# Patient Record
Sex: MALE | Race: WHITE | Employment: OTHER | URBAN - METROPOLITAN AREA
[De-identification: names, ages, dates, MRNs, and addresses within clinical notes are randomized per-mention and may not be internally consistent; named-entity substitution may affect disease eponyms.]

---

## 2018-03-13 ENCOUNTER — HOSPITAL ENCOUNTER (OUTPATIENT)
Dept: CARDIAC REHAB | Age: 83
Setting detail: THERAPIES SERIES
Discharge: HOME | End: 2018-03-13

## 2018-03-24 PROCEDURE — 9900000038 HC CARDIAC REHAB PHASE 3 - MONTHLY

## 2018-04-03 ENCOUNTER — HOSPITAL ENCOUNTER (OUTPATIENT)
Age: 83
Setting detail: THERAPIES SERIES
Discharge: HOME | End: 2018-04-03

## 2018-04-03 PROCEDURE — 9900000038 HC CARDIAC REHAB PHASE 3 - MONTHLY

## 2018-04-03 RX ORDER — METOPROLOL SUCCINATE 25 MG/1
25 TABLET, EXTENDED RELEASE ORAL DAILY
Qty: 30 TABLET | Refills: 11 | Status: SHIPPED | OUTPATIENT
Start: 2018-04-03 | End: 2019-04-04 | Stop reason: SDUPTHER

## 2018-05-07 ENCOUNTER — HOSPITAL ENCOUNTER (OUTPATIENT)
Age: 83
Setting detail: THERAPIES SERIES
Discharge: HOME | End: 2018-05-07

## 2018-05-07 PROCEDURE — 9900000038 HC CARDIAC REHAB PHASE 3 - MONTHLY

## 2018-05-16 ENCOUNTER — NURSE ONLY (OUTPATIENT)
Dept: NON INVASIVE DIAGNOSTICS | Age: 83
End: 2018-05-16
Payer: MEDICARE

## 2018-05-16 ENCOUNTER — TELEPHONE (OUTPATIENT)
Dept: NON INVASIVE DIAGNOSTICS | Age: 83
End: 2018-05-16

## 2018-05-16 DIAGNOSIS — Z95.810 ICD (IMPLANTABLE CARDIOVERTER-DEFIBRILLATOR), BIVENTRICULAR, IN SITU: Primary | ICD-10-CM

## 2018-05-16 DIAGNOSIS — I50.22 CHRONIC SYSTOLIC HEART FAILURE (HCC): ICD-10-CM

## 2018-05-16 PROCEDURE — 93295 DEV INTERROG REMOTE 1/2/MLT: CPT | Performed by: INTERNAL MEDICINE

## 2018-05-16 PROCEDURE — 93296 REM INTERROG EVL PM/IDS: CPT | Performed by: INTERNAL MEDICINE

## 2018-06-01 ENCOUNTER — HOSPITAL ENCOUNTER (OUTPATIENT)
Age: 83
Discharge: HOME | End: 2018-06-01

## 2018-06-01 PROCEDURE — 9900000038 HC CARDIAC REHAB PHASE 3 - MONTHLY

## 2018-06-05 ENCOUNTER — OFFICE VISIT (OUTPATIENT)
Dept: CARDIOLOGY CLINIC | Age: 83
End: 2018-06-05
Payer: MEDICARE

## 2018-06-05 VITALS
BODY MASS INDEX: 20.5 KG/M2 | HEIGHT: 71 IN | SYSTOLIC BLOOD PRESSURE: 112 MMHG | WEIGHT: 146.4 LBS | DIASTOLIC BLOOD PRESSURE: 80 MMHG | RESPIRATION RATE: 16 BRPM | HEART RATE: 71 BPM

## 2018-06-05 DIAGNOSIS — I10 ESSENTIAL HYPERTENSION, BENIGN: Primary | Chronic | ICD-10-CM

## 2018-06-05 PROCEDURE — 1123F ACP DISCUSS/DSCN MKR DOCD: CPT | Performed by: INTERNAL MEDICINE

## 2018-06-05 PROCEDURE — 99214 OFFICE O/P EST MOD 30 MIN: CPT | Performed by: INTERNAL MEDICINE

## 2018-06-05 PROCEDURE — G8598 ASA/ANTIPLAT THER USED: HCPCS | Performed by: INTERNAL MEDICINE

## 2018-06-05 PROCEDURE — 4040F PNEUMOC VAC/ADMIN/RCVD: CPT | Performed by: INTERNAL MEDICINE

## 2018-06-05 PROCEDURE — 93000 ELECTROCARDIOGRAM COMPLETE: CPT | Performed by: INTERNAL MEDICINE

## 2018-06-05 PROCEDURE — 1036F TOBACCO NON-USER: CPT | Performed by: INTERNAL MEDICINE

## 2018-06-05 PROCEDURE — G8427 DOCREV CUR MEDS BY ELIG CLIN: HCPCS | Performed by: INTERNAL MEDICINE

## 2018-06-05 PROCEDURE — G8420 CALC BMI NORM PARAMETERS: HCPCS | Performed by: INTERNAL MEDICINE

## 2018-07-02 ENCOUNTER — HOSPITAL ENCOUNTER (OUTPATIENT)
Age: 83
Discharge: HOME | End: 2018-07-02

## 2018-07-02 PROCEDURE — 9900000038 HC CARDIAC REHAB PHASE 3 - MONTHLY

## 2018-08-01 ENCOUNTER — HOSPITAL ENCOUNTER (OUTPATIENT)
Age: 83
Discharge: HOME | End: 2018-08-01

## 2018-08-01 PROCEDURE — 9900000038 HC CARDIAC REHAB PHASE 3 - MONTHLY

## 2018-08-15 ENCOUNTER — TELEPHONE (OUTPATIENT)
Dept: NON INVASIVE DIAGNOSTICS | Age: 83
End: 2018-08-15

## 2018-08-15 ENCOUNTER — NURSE ONLY (OUTPATIENT)
Dept: NON INVASIVE DIAGNOSTICS | Age: 83
End: 2018-08-15
Payer: MEDICARE

## 2018-08-15 DIAGNOSIS — I50.22 CHRONIC SYSTOLIC HEART FAILURE (HCC): ICD-10-CM

## 2018-08-15 DIAGNOSIS — Z95.810 ICD (IMPLANTABLE CARDIOVERTER-DEFIBRILLATOR), BIVENTRICULAR, IN SITU: Primary | ICD-10-CM

## 2018-08-15 PROCEDURE — 93296 REM INTERROG EVL PM/IDS: CPT | Performed by: INTERNAL MEDICINE

## 2018-08-15 PROCEDURE — 93295 DEV INTERROG REMOTE 1/2/MLT: CPT | Performed by: INTERNAL MEDICINE

## 2018-08-15 NOTE — TELEPHONE ENCOUNTER
We have received your remote transmission. Our staff will contact you if there is anything that needs to be discussed. Your next appointment is 11/14/2018 remote transmission from home.

## 2018-09-01 ENCOUNTER — HOSPITAL ENCOUNTER (OUTPATIENT)
Age: 83
Discharge: HOME | End: 2018-09-01

## 2018-09-01 PROCEDURE — 9900000038 HC CARDIAC REHAB PHASE 3 - MONTHLY

## 2018-10-04 ENCOUNTER — HOSPITAL ENCOUNTER (OUTPATIENT)
Age: 83
Discharge: HOME | End: 2018-10-04

## 2018-10-04 PROCEDURE — 9900000038 HC CARDIAC REHAB PHASE 3 - MONTHLY

## 2018-11-01 ENCOUNTER — HOSPITAL ENCOUNTER (OUTPATIENT)
Age: 83
Discharge: HOME | End: 2018-11-01

## 2018-11-01 PROCEDURE — 9900000038 HC CARDIAC REHAB PHASE 3 - MONTHLY

## 2018-11-14 ENCOUNTER — NURSE ONLY (OUTPATIENT)
Dept: NON INVASIVE DIAGNOSTICS | Age: 83
End: 2018-11-14
Payer: MEDICARE

## 2018-11-14 ENCOUNTER — TELEPHONE (OUTPATIENT)
Dept: NON INVASIVE DIAGNOSTICS | Age: 83
End: 2018-11-14

## 2018-11-14 DIAGNOSIS — Z95.810 ICD (IMPLANTABLE CARDIOVERTER-DEFIBRILLATOR), BIVENTRICULAR, IN SITU: Primary | ICD-10-CM

## 2018-11-14 DIAGNOSIS — I48.21 PERMANENT ATRIAL FIBRILLATION (HCC): ICD-10-CM

## 2018-11-14 PROCEDURE — 93295 DEV INTERROG REMOTE 1/2/MLT: CPT | Performed by: INTERNAL MEDICINE

## 2018-11-14 PROCEDURE — 93296 REM INTERROG EVL PM/IDS: CPT | Performed by: INTERNAL MEDICINE

## 2018-12-03 ENCOUNTER — HOSPITAL ENCOUNTER (OUTPATIENT)
Age: 83
Discharge: HOME | End: 2018-12-03

## 2018-12-03 PROCEDURE — 9900000038 HC CARDIAC REHAB PHASE 3 - MONTHLY

## 2018-12-14 ENCOUNTER — OFFICE VISIT (OUTPATIENT)
Dept: NON INVASIVE DIAGNOSTICS | Age: 83
End: 2018-12-14
Payer: MEDICARE

## 2018-12-14 VITALS
HEIGHT: 70 IN | BODY MASS INDEX: 21.22 KG/M2 | WEIGHT: 148.2 LBS | HEART RATE: 70 BPM | RESPIRATION RATE: 16 BRPM | SYSTOLIC BLOOD PRESSURE: 136 MMHG | DIASTOLIC BLOOD PRESSURE: 82 MMHG

## 2018-12-14 DIAGNOSIS — Z95.810 ICD (IMPLANTABLE CARDIOVERTER-DEFIBRILLATOR), BIVENTRICULAR, IN SITU: Primary | ICD-10-CM

## 2018-12-14 PROCEDURE — 99213 OFFICE O/P EST LOW 20 MIN: CPT | Performed by: INTERNAL MEDICINE

## 2018-12-14 PROCEDURE — 1036F TOBACCO NON-USER: CPT | Performed by: INTERNAL MEDICINE

## 2018-12-14 PROCEDURE — G8420 CALC BMI NORM PARAMETERS: HCPCS | Performed by: INTERNAL MEDICINE

## 2018-12-14 PROCEDURE — G8484 FLU IMMUNIZE NO ADMIN: HCPCS | Performed by: INTERNAL MEDICINE

## 2018-12-14 PROCEDURE — G8598 ASA/ANTIPLAT THER USED: HCPCS | Performed by: INTERNAL MEDICINE

## 2018-12-14 PROCEDURE — G8427 DOCREV CUR MEDS BY ELIG CLIN: HCPCS | Performed by: INTERNAL MEDICINE

## 2018-12-14 PROCEDURE — 1123F ACP DISCUSS/DSCN MKR DOCD: CPT | Performed by: INTERNAL MEDICINE

## 2018-12-14 PROCEDURE — 1101F PT FALLS ASSESS-DOCD LE1/YR: CPT | Performed by: INTERNAL MEDICINE

## 2018-12-14 PROCEDURE — 4040F PNEUMOC VAC/ADMIN/RCVD: CPT | Performed by: INTERNAL MEDICINE

## 2018-12-14 PROCEDURE — 93284 PRGRMG EVAL IMPLANTABLE DFB: CPT | Performed by: INTERNAL MEDICINE

## 2019-01-02 ENCOUNTER — OFFICE VISIT (OUTPATIENT)
Dept: CARDIOLOGY CLINIC | Age: 84
End: 2019-01-02
Payer: MEDICARE

## 2019-01-02 VITALS
RESPIRATION RATE: 16 BRPM | DIASTOLIC BLOOD PRESSURE: 70 MMHG | HEART RATE: 92 BPM | HEIGHT: 69 IN | SYSTOLIC BLOOD PRESSURE: 128 MMHG | BODY MASS INDEX: 21.71 KG/M2 | WEIGHT: 146.6 LBS

## 2019-01-02 DIAGNOSIS — I10 ESSENTIAL HYPERTENSION, BENIGN: Primary | Chronic | ICD-10-CM

## 2019-01-02 PROCEDURE — 4040F PNEUMOC VAC/ADMIN/RCVD: CPT | Performed by: INTERNAL MEDICINE

## 2019-01-02 PROCEDURE — G8427 DOCREV CUR MEDS BY ELIG CLIN: HCPCS | Performed by: INTERNAL MEDICINE

## 2019-01-02 PROCEDURE — G8598 ASA/ANTIPLAT THER USED: HCPCS | Performed by: INTERNAL MEDICINE

## 2019-01-02 PROCEDURE — G8484 FLU IMMUNIZE NO ADMIN: HCPCS | Performed by: INTERNAL MEDICINE

## 2019-01-02 PROCEDURE — G8420 CALC BMI NORM PARAMETERS: HCPCS | Performed by: INTERNAL MEDICINE

## 2019-01-02 PROCEDURE — 1123F ACP DISCUSS/DSCN MKR DOCD: CPT | Performed by: INTERNAL MEDICINE

## 2019-01-02 PROCEDURE — 1101F PT FALLS ASSESS-DOCD LE1/YR: CPT | Performed by: INTERNAL MEDICINE

## 2019-01-02 PROCEDURE — 99214 OFFICE O/P EST MOD 30 MIN: CPT | Performed by: INTERNAL MEDICINE

## 2019-01-02 PROCEDURE — 93000 ELECTROCARDIOGRAM COMPLETE: CPT | Performed by: INTERNAL MEDICINE

## 2019-01-02 PROCEDURE — 1036F TOBACCO NON-USER: CPT | Performed by: INTERNAL MEDICINE

## 2019-01-05 ENCOUNTER — HOSPITAL ENCOUNTER (OUTPATIENT)
Age: 84
Discharge: HOME | End: 2019-01-05

## 2019-01-05 PROCEDURE — 9900000038 HC CARDIAC REHAB PHASE 3 - MONTHLY

## 2019-02-05 ENCOUNTER — HOSPITAL ENCOUNTER (OUTPATIENT)
Age: 84
Discharge: HOME | End: 2019-02-05

## 2019-02-05 PROCEDURE — 9900000038 HC CARDIAC REHAB PHASE 3 - MONTHLY

## 2019-02-13 ENCOUNTER — NURSE ONLY (OUTPATIENT)
Dept: NON INVASIVE DIAGNOSTICS | Age: 84
End: 2019-02-13
Payer: MEDICARE

## 2019-02-13 ENCOUNTER — TELEPHONE (OUTPATIENT)
Dept: NON INVASIVE DIAGNOSTICS | Age: 84
End: 2019-02-13

## 2019-02-13 DIAGNOSIS — I48.21 PERMANENT ATRIAL FIBRILLATION (HCC): ICD-10-CM

## 2019-02-13 DIAGNOSIS — Z95.810 ICD (IMPLANTABLE CARDIOVERTER-DEFIBRILLATOR), BIVENTRICULAR, IN SITU: Primary | ICD-10-CM

## 2019-02-13 PROCEDURE — 93296 REM INTERROG EVL PM/IDS: CPT | Performed by: INTERNAL MEDICINE

## 2019-02-13 PROCEDURE — 93295 DEV INTERROG REMOTE 1/2/MLT: CPT | Performed by: INTERNAL MEDICINE

## 2019-03-02 ENCOUNTER — HOSPITAL ENCOUNTER (OUTPATIENT)
Age: 84
Discharge: HOME | End: 2019-03-02

## 2019-03-02 PROCEDURE — 9900000038 HC CARDIAC REHAB PHASE 3 - MONTHLY

## 2019-04-03 ENCOUNTER — HOSPITAL ENCOUNTER (OUTPATIENT)
Age: 84
Discharge: HOME | End: 2019-04-03

## 2019-04-03 PROCEDURE — 9900000038 HC CARDIAC REHAB PHASE 3 - MONTHLY

## 2019-04-04 RX ORDER — METOPROLOL SUCCINATE 25 MG/1
25 TABLET, EXTENDED RELEASE ORAL DAILY
Qty: 30 TABLET | Refills: 11 | Status: SHIPPED
Start: 2019-04-04 | End: 2020-03-27 | Stop reason: SDUPTHER

## 2019-05-01 ENCOUNTER — HOSPITAL ENCOUNTER (OUTPATIENT)
Age: 84
Discharge: HOME | End: 2019-05-01

## 2019-05-01 PROCEDURE — 9900000038 HC CARDIAC REHAB PHASE 3 - MONTHLY

## 2019-05-15 ENCOUNTER — NURSE ONLY (OUTPATIENT)
Dept: NON INVASIVE DIAGNOSTICS | Age: 84
End: 2019-05-15
Payer: MEDICARE

## 2019-05-15 DIAGNOSIS — I48.21 PERMANENT ATRIAL FIBRILLATION (HCC): ICD-10-CM

## 2019-05-15 DIAGNOSIS — Z95.810 ICD (IMPLANTABLE CARDIOVERTER-DEFIBRILLATOR), BIVENTRICULAR, IN SITU: Primary | ICD-10-CM

## 2019-05-15 PROCEDURE — 93296 REM INTERROG EVL PM/IDS: CPT | Performed by: INTERNAL MEDICINE

## 2019-05-15 PROCEDURE — 93295 DEV INTERROG REMOTE 1/2/MLT: CPT | Performed by: INTERNAL MEDICINE

## 2019-05-22 ENCOUNTER — TELEPHONE (OUTPATIENT)
Dept: NON INVASIVE DIAGNOSTICS | Age: 84
End: 2019-05-22

## 2019-05-22 NOTE — PROGRESS NOTES
See [facility] [place] report. Remote monitoring reviewed over a 90 day period. End of 90 day monitoring period date of service 5.15.2019.

## 2019-05-22 NOTE — TELEPHONE ENCOUNTER
----- Message from Jj Barkley RN sent at 5/22/2019 11:51 AM EDT -----  Successful transmission received. Please call patient and give next appointment.

## 2019-05-22 NOTE — TELEPHONE ENCOUNTER
We have received your remote transmission. Our staff will contact you if there is anything that needs to be discussed. Your next appointment is 08/14/2019 remote transmission from home    Pt is wireless.

## 2019-06-01 ENCOUNTER — HOSPITAL ENCOUNTER (OUTPATIENT)
Age: 84
Discharge: HOME | End: 2019-06-01

## 2019-06-01 PROCEDURE — 9900000038 HC CARDIAC REHAB PHASE 3 - MONTHLY

## 2019-07-08 ENCOUNTER — HOSPITAL ENCOUNTER (OUTPATIENT)
Age: 84
Discharge: HOME | End: 2019-07-08

## 2019-07-08 PROCEDURE — 9900000038 HC CARDIAC REHAB PHASE 3 - MONTHLY

## 2019-08-01 ENCOUNTER — HOSPITAL ENCOUNTER (OUTPATIENT)
Age: 84
Discharge: HOME | End: 2019-08-01

## 2019-08-01 PROCEDURE — 9900000038 HC CARDIAC REHAB PHASE 3 - MONTHLY

## 2019-08-12 ENCOUNTER — OFFICE VISIT (OUTPATIENT)
Dept: CARDIOLOGY CLINIC | Age: 84
End: 2019-08-12
Payer: MEDICARE

## 2019-08-12 VITALS
WEIGHT: 148.2 LBS | HEART RATE: 73 BPM | SYSTOLIC BLOOD PRESSURE: 120 MMHG | HEIGHT: 71 IN | BODY MASS INDEX: 20.75 KG/M2 | DIASTOLIC BLOOD PRESSURE: 72 MMHG | RESPIRATION RATE: 16 BRPM

## 2019-08-12 DIAGNOSIS — I25.810 CORONARY ARTERY DISEASE INVOLVING CORONARY BYPASS GRAFT OF NATIVE HEART WITHOUT ANGINA PECTORIS: Primary | ICD-10-CM

## 2019-08-12 PROCEDURE — G8427 DOCREV CUR MEDS BY ELIG CLIN: HCPCS | Performed by: INTERNAL MEDICINE

## 2019-08-12 PROCEDURE — G8598 ASA/ANTIPLAT THER USED: HCPCS | Performed by: INTERNAL MEDICINE

## 2019-08-12 PROCEDURE — G8420 CALC BMI NORM PARAMETERS: HCPCS | Performed by: INTERNAL MEDICINE

## 2019-08-12 PROCEDURE — 99214 OFFICE O/P EST MOD 30 MIN: CPT | Performed by: INTERNAL MEDICINE

## 2019-08-12 PROCEDURE — 93000 ELECTROCARDIOGRAM COMPLETE: CPT | Performed by: INTERNAL MEDICINE

## 2019-08-12 PROCEDURE — 4040F PNEUMOC VAC/ADMIN/RCVD: CPT | Performed by: INTERNAL MEDICINE

## 2019-08-12 PROCEDURE — 1036F TOBACCO NON-USER: CPT | Performed by: INTERNAL MEDICINE

## 2019-08-12 PROCEDURE — 1123F ACP DISCUSS/DSCN MKR DOCD: CPT | Performed by: INTERNAL MEDICINE

## 2019-08-12 NOTE — PROGRESS NOTES
 Hypertension    S/P CABG x 2    S/P MVR (mitral valve replacement)    S/P TVR (tricuspid valve repair)    Chronic systolic heart failure (HCC)    Pacemaker-dependent due to native cardiac rhythm insufficient to support life    ICD (implantable cardioverter-defibrillator), biventricular, in situ       Allergies   Allergen Reactions    Codeine Other (See Comments)     Upsets patient, patient becomes very agitated       Current Outpatient Medications   Medication Sig Dispense Refill    furosemide (LASIX) 20 MG tablet take 1 tablet by mouth daily 90 tablet 3    metoprolol succinate (TOPROL XL) 25 MG extended release tablet Take 1 tablet by mouth daily 30 tablet 11    ENTRESTO 24-26 MG per tablet take 1 tablet by mouth twice a day 60 tablet 11    acetaminophen (TYLENOL) 500 MG tablet Take 500 mg by mouth 2 times daily      warfarin (COUMADIN) 3 MG tablet 3 mg daily   0    Multiple Vitamins-Minerals (THERAPEUTIC MULTIVITAMIN-MINERALS) tablet Take 1 tablet by mouth daily      pravastatin (PRAVACHOL) 20 MG tablet Take 20 mg by mouth daily       No current facility-administered medications for this visit.         Social History     Socioeconomic History    Marital status:      Spouse name: Not on file    Number of children: Not on file    Years of education: Not on file    Highest education level: Not on file   Occupational History    Not on file   Social Needs    Financial resource strain: Not on file    Food insecurity:     Worry: Not on file     Inability: Not on file    Transportation needs:     Medical: Not on file     Non-medical: Not on file   Tobacco Use    Smoking status: Former Smoker     Packs/day: 1.50     Last attempt to quit: 3/17/2010     Years since quittin.4    Smokeless tobacco: Never Used   Substance and Sexual Activity    Alcohol use: Yes     Comment: occ wine    Drug use: No    Sexual activity: Not on file   Lifestyle    Physical activity:     Days per week: Not on file     Minutes per session: Not on file    Stress: Not on file   Relationships    Social connections:     Talks on phone: Not on file     Gets together: Not on file     Attends Mandaeism service: Not on file     Active member of club or organization: Not on file     Attends meetings of clubs or organizations: Not on file     Relationship status: Not on file    Intimate partner violence:     Fear of current or ex partner: Not on file     Emotionally abused: Not on file     Physically abused: Not on file     Forced sexual activity: Not on file   Other Topics Concern    Not on file   Social History Narrative    Not on file       Family History   Problem Relation Age of Onset    Heart Disease Father        Review of Systems:  Heart: as above   Lungs: as above   Eyes: denies changes in vision or discharge. Ears: denies changes in hearing or pain. Nose: denies epistaxis or masses   Throat: denies sore throat or trouble swallowing. Neuro: denies numbness, tingling, tremors. Skin: denies rashes or itching. : denies hematuria, dysuria   GI: denies vomiting, diarrhea   Psych: denies mood changed, anxiety, depression. All other systems negative. Physical Exam   /72   Pulse 73   Resp 16   Ht 5' 11\" (1.803 m)   Wt 148 lb 3.2 oz (67.2 kg)   BMI 20.67 kg/m²   Constitutional: Oriented to person, place, and time. Well-developed and well-nourished. No distress. Head: Normocephalic and atraumatic. Eyes: EOM are normal. Pupils are equal, round, and reactive to light. Neck: Normal range of motion. Neck supple. No hepatojugular reflux and no JVD present. Carotid bruit is not present. No tracheal deviation present. No thyromegaly present. Cardiovascular: Normal rate, regular rhythm, ANJANA 2/6  Pulmonary/Chest: Effort normal and breath sounds normal. No respiratory distress. No wheezes. No rales. No tenderness. Abdominal: Soft. Bowel sounds are normal. No distension and no mass. No tenderness. No rebound and no guarding. Musculoskeletal: Normal range of motion. No edema and no tenderness. Lymphadenopathy:   No cervical adenopathy. No groin adenopathy. Neurological: Alert and oriented to person, place, and time. Skin: Skin is warm and dry. No rash noted. Not diaphoretic. No erythema. Psychiatric: Normal mood and affect. Behavior is normal.     EKG:  V paced. Echo Summary 12/7/17:  Mardee Shad fraction is visually estimated at 55-60%.   No regional wall motion abnormalities seen.   Mild left ventricular concentric hypertrophy noted.   Normal right ventricle structure and function.   Left atrial volume index of 87 ml per meters squared BSA.   Normally functioning bioprosthetic artificial valve in mitral position. Mean transmitral gradient 4.3 mmHg.   Physiologic and/or trace mitral regurgitation is present.   Moderate tricuspid regurgitation.  RVSP is 32 mmHg. ASSESSMENT AND PLAN:  Patient Active Problem List   Diagnosis    Essential hypertension, benign    Severe mitral regurgitation    Atrial fibrillation (HCC)    Protein-calorie malnutrition, severe (HCC)    AAA (abdominal aortic aneurysm) (HCC)    CAD (coronary artery disease)    Hypertension    S/P CABG x 2    S/P MVR (mitral valve replacement)    S/P TVR (tricuspid valve repair)    Chronic systolic heart failure ([address]    Pacemaker-dependent due to native cardiac rhythm insufficient to support life    ICD (implantable cardioverter-defibrillator), biventricular, in situ     1. Edema/CMP/Acute on chronic systolic CHF:   LVEF 61-89% on 12/17 echo. (improved from severely declined)    Continue Entresto/Toprol/lasix PRN. Stable volume. 2. CABG/CAD: BB/statin. No ASA due to warfarin. 3. Chronic Afib: On warfarin. 4. MVR/TV repair: Echo with stable MVR function 12/17.     5. BiV-ICD: Following with device clinic. 6. AAA s/p Endovascular stent graft    Deandra Werner D.O.   Cardiologist  Cardiology, [facility]) Physicians

## 2019-08-14 ENCOUNTER — NURSE ONLY (OUTPATIENT)
Dept: NON INVASIVE DIAGNOSTICS | Age: 84
End: 2019-08-14
Payer: MEDICARE

## 2019-08-14 DIAGNOSIS — I48.21 PERMANENT ATRIAL FIBRILLATION (HCC): ICD-10-CM

## 2019-08-14 DIAGNOSIS — Z95.810 ICD (IMPLANTABLE CARDIOVERTER-DEFIBRILLATOR), BIVENTRICULAR, IN SITU: Primary | ICD-10-CM

## 2019-08-14 PROCEDURE — 93296 REM INTERROG EVL PM/IDS: CPT | Performed by: INTERNAL MEDICINE

## 2019-08-14 PROCEDURE — 93295 DEV INTERROG REMOTE 1/2/MLT: CPT | Performed by: INTERNAL MEDICINE

## 2019-08-23 ENCOUNTER — TELEPHONE (OUTPATIENT)
Dept: NON INVASIVE DIAGNOSTICS | Age: 84
End: 2019-08-23

## 2019-08-23 NOTE — PROGRESS NOTES
See [facility] [place] report. Remote monitoring reviewed over a 90 day period. End of 90 day monitoring period date of service 8.14.2019.

## 2019-09-16 PROCEDURE — 9900000038 HC CARDIAC REHAB PHASE 3 - MONTHLY

## 2019-09-30 ENCOUNTER — HOSPITAL ENCOUNTER (OUTPATIENT)
Dept: CARDIAC REHAB | Age: 84
Discharge: HOME | End: 2019-09-30

## 2019-10-05 PROCEDURE — 9900000038 HC CARDIAC REHAB PHASE 3 - MONTHLY

## 2019-10-28 ENCOUNTER — HOSPITAL ENCOUNTER (OUTPATIENT)
Dept: CARDIAC REHAB | Age: 84
Discharge: HOME | End: 2019-10-28

## 2019-11-09 PROCEDURE — 9900000038 HC CARDIAC REHAB PHASE 3 - MONTHLY

## 2019-11-13 ENCOUNTER — NURSE ONLY (OUTPATIENT)
Dept: NON INVASIVE DIAGNOSTICS | Age: 84
End: 2019-11-13
Payer: MEDICARE

## 2019-11-13 DIAGNOSIS — Z95.810 ICD (IMPLANTABLE CARDIOVERTER-DEFIBRILLATOR), BIVENTRICULAR, IN SITU: Primary | ICD-10-CM

## 2019-11-13 DIAGNOSIS — I48.21 PERMANENT ATRIAL FIBRILLATION (HCC): ICD-10-CM

## 2019-11-13 PROCEDURE — 93296 REM INTERROG EVL PM/IDS: CPT | Performed by: INTERNAL MEDICINE

## 2019-11-13 PROCEDURE — 93295 DEV INTERROG REMOTE 1/2/MLT: CPT | Performed by: INTERNAL MEDICINE

## 2019-11-22 ENCOUNTER — TELEPHONE (OUTPATIENT)
Dept: NON INVASIVE DIAGNOSTICS | Age: 84
End: 2019-11-22

## 2019-11-25 ENCOUNTER — HOSPITAL ENCOUNTER (OUTPATIENT)
Dept: CARDIAC REHAB | Age: 84
Discharge: HOME | End: 2019-11-25

## 2019-12-07 PROCEDURE — 9900000038 HC CARDIAC REHAB PHASE 3 - MONTHLY

## 2019-12-30 ENCOUNTER — HOSPITAL ENCOUNTER (OUTPATIENT)
Dept: CARDIAC REHAB | Age: 84
Discharge: HOME | End: 2019-12-30

## 2020-01-18 PROCEDURE — 9900000038 HC CARDIAC REHAB PHASE 3 - MONTHLY

## 2020-01-27 ENCOUNTER — HOSPITAL ENCOUNTER (OUTPATIENT)
Dept: CARDIAC REHAB | Age: 85
Discharge: HOME | End: 2020-01-27

## 2020-02-02 NOTE — PROGRESS NOTES
(tricuspid valve repair)     Severe mitral regurgitation 05/02/2014    Atrial fibrillation ([address] 05/02/2014    Protein-calorie malnutrition, severe (HCC) 05/02/2014    Essential hypertension, benign 05/01/2014       Current Outpatient Medications   Medication Sig Dispense Refill    furosemide (LASIX) 20 MG tablet take 1 tablet by mouth daily 90 tablet 3    metoprolol succinate (TOPROL XL) 25 MG extended release tablet Take 1 tablet by mouth daily 30 tablet 11    ENTRESTO 24-26 MG per tablet take 1 tablet by mouth twice a day 60 tablet 11    acetaminophen (TYLENOL) 500 MG tablet Take 500 mg by mouth 2 times daily      warfarin (COUMADIN) 3 MG tablet 3 mg daily   0    Multiple Vitamins-Minerals (THERAPEUTIC MULTIVITAMIN-MINERALS) tablet Take 1 tablet by mouth daily      pravastatin (PRAVACHOL) 20 MG tablet Take 20 mg by mouth daily       No current facility-administered medications for this visit. Allergies   Allergen Reactions    Codeine Other (See Comments)     Upsets patient, patient becomes very agitated     Review of Systems   Respiratory: Negative. Cardiovascular: Negative. Neurological: Negative. All other systems reviewed and are negative. PHYSICAL EXAM:  Vitals:    02/03/20 1420   BP: 130/80   Pulse: 68   Resp: 16   Weight: 149 lb 3.2 oz (67.7 kg)   Height: 5' 10\" (1.778 m)     Constitutional: Oriented to person, place, and time. Well-developed and cooperative. Head: Normocephalic and atraumatic. Eyes: Conjunctivae are normal.   Cardiovascular: S1 normal, S2 normal and intact distal pulses. Normal rate and rhythm. PMI is not displaced. Pulmonary/Chest: Effort normal and breath sounds normal. No respiratory distress. Abdominal: Soft. Normal appearance. No tenderness. Musculoskeletal: Normal range of motion of all extremities, no muscle weakness. Neurological: Alert and oriented to person, place, and time. Gait- uses cane. Skin: Skin is warm and dry.  No bruising, no ecchymosis and no rash noted. Extremity: No clubbing or cyanosis. No edema. Psychiatric: Normal mood and affect. Thought content normal.   CRT-P site: stable, well healed, no evidence of erosion. Last TTE: 12/7/17:  Transthoracic Echocardiography Report (TTE)     Demographics      Patient Name     Lian OTOOLE Gender              Male                     A      Medical Record    [id number]        Room Number         Smiley Sanchez      Account #         [de-identified]      Procedure Date      12/07/2017      Corporate ID                      Ordering Physician Jessica Found DO      Accession Number  [id number]       Referring Physician Vincenzo Varner. Eneida Sanchez MD      Date of Birth     1934      Sonographer         Kelley Hartley                                                         [facility]      Age               83 year(s)      Interpreting       Jessica Found DO                                     Physician                                        Any Other     Procedure    Type of Study      TTE procedure:Echo Complete W/ Dop & Color Flow.     Procedure Date  Date: 12/07/2017 Start: 12:57 PM    Study Location: Echo Lab  Technical Quality: Adequate visualization    Indications:MVR bioprosthetic. Patient Status: Routine    Height: 71 inches Weight: 144 pounds BSA: 1.83 m^2 BMI: 20.08 kg/m^2    BP: 120/78 mmHg    Allergies    - Codeine:Severity - Moderate;Sensitivity - Allergy.     Findings      Left Ventricle   Ejection fraction is visually estimated at 55-60%. No regional wall motion   abnormalities seen. Mild left ventricular concentric hypertrophy noted.   Left ventricle size is normal. Indeterminate diastolic function.      Right Ventricle   Normal right ventricle structure and function.  Pacer wire visualized in   right ventricle.      Left Atrium   Left atrial volume index of 87 ml per meters squared BSA.      Right Atrium   Moderately enlarged right atrium size.      Mitral Valve   Normally functioning bioprosthetic artificial valve in mitral position.   Mean transmitral gradient 4.3 mmHg. Physiologic and/or trace mitral   regurgitation is present.      Tricuspid Valve   The tricuspid valve appears structurally normal.   Moderate tricuspid regurgitation.  RVSP is 32 mmHg.      Aortic Valve   The aortic valve is trileaflet. No hemodynamically significant aortic   stenosis is present. No evidence of aortic valve regurgitation.      Pulmonic Valve   The pulmonic valve was not well visualized. Mild pulmonic regurgitation   present.      Pericardial Effusion   No evidence for hemodynamically significant pericardial effusion.      Aorta   Normal aortic root. Mildly dilated ascending aorta.   Miscellaneous   The inferior vena cava diameter is normal with normal respiratory   variation.      Conclusions      Summary   Ejection fraction is visually estimated at 55-60%.   No regional wall motion abnormalities seen.   Mild left ventricular concentric hypertrophy noted.   Normal right ventricle structure and function.   Left atrial volume index of 87 ml per meters squared BSA.   Normally functioning bioprosthetic artificial valve in mitral position.   Mean transmitral gradient 4.3 mmHg.   Physiologic and/or trace mitral regurgitation is present.   Moderate tricuspid regurgitation.  RVSP is 32 mmHg.      Signature      ----------------------------------------------------------------   Electronically signed by Omid Braden DO(Interpreting   physician) on 12/07/2017 04:43 PM   ----------------------------------------------------------------     M-Mode/2D Measurements & Calculations      LV Diastolic    LV Systolic Dimension: 3.3   AV Cusp Separation: 1.3 cmLA   Dimension: 4.8  cm                           Dimension: 4.6 cmAO Root   cm              LV Volume Diastolic: 611. 3   Dimension: 3.8 cm   LV FS:31.3 %    ml   LV PW           LV Volume Systolic: 53.2 ml   Diastolic: 1.2  LV EDV/LV EDV Index: 108. 3   cm              ml/59 ml/m^2LV ESV/LV ESV    RV Diastolic Dimension: 3.4   LV PW Systolic: Index: 83.6 PL/12IW/ m^2     cm   1.3 cm          EF Calculated: 58.1 %   Septum          LV Mass Index: 127 l/min*m^2 LA/Aorta: 5.91   Diastolic: 1.3  LV Length: 9.9 cm            Ascending Aorta: 3.8 cm   cm              AV Area (2D): 2.1 cm^2       LA volume/Index: 160.6 ml   Septum                                       /10YD/S^7   Systolic: 1.8   LVOT: 2.8 cm                 RA Area: 26.8 cm^2   cm                                                IVC Expiration: 1.6 cm   LV Mass: 232.25   g     Doppler Measurements & Calculations      MV Peak E-Wave: 1.76 AV Peak Velocity: 1.32 LVOT Peak Velocity: 0.83 m/s   m/s                  m/s                    LVOT Mean Velocity: 0.42 m/s                        AV Peak Gradient: 6.97 LVOT Peak Gradient: 2.8   MV Peak Gradient:    mmHg                   mmHgLVOT Mean Gradient: 1 mmHg   12.4 mmHg            AV Mean Velocity: 0.89 Estimated RVSP: 32.22 mmHg   MV Mean Gradient:    m/s                    Estimated RAP:3 mmHg   4.3 mmHg             AV Mean Gradient: 3.6   MV Mean Velocity:    mmHg                   TV Mean Velocity: 0.71 m/s   0.95 m/s             AV VTI: 26.9 cm        TV Mean Gradient: 2.3 mmHg   MV Deceleration      AV Area                TR Velocity:2.7 m/s   Time: 280.1 msec     (Continuity):3.34 cm^2 TR Gradient:29.22 mmHg   MV P1/2t: 99.7 msec                         PV Peak Velocity: 1.3 m/s   MVA by PHT:2.21 cm^2 LVOT VTI: 14.6 cm      PV Peak Gradient: 6.8 mmHg   MV Area                                     PV Mean Velocity: 0.65 m/s   (continuity): 2 cm^2 Estimated PASP: 32.22  PV Mean Gradient: 2.3 mmHg                        mmHg                                               TX ED Velocity: 1.17 m/s    Device Reprogramming dependent ( 2/3/20 )  Make/Model:  BSCI CRT-D INOGEN X4  Mode: DDR  ppm  F wave: 1.4 mV. Impedance: 516  ohms, Threshold: N/A 2/2 AF. RV R wave: Paced. Impedance: 387  ohms. Threshold: 2.1 V @ 1.0 ms  LV  Impedance: 502 ohms. Threshold: 2.0 V @ 1.0 ms  Pacing: A: <1%  RV: 98%  LV: 98%  Battery Voltage/Longevity: 3.0 years     Arrhythmias: Permanent AF  Reprogramming included:  turned on rate response  Overall device function is normal  All device programmable settings were evaluated per above and in the scanned document, along with iterative adjustments (capture thresholds) to assess and select the most appropriate final programming to provide for consistent delivery of the appropriate therapy and to verify function of the device. I have independently reviewed all of the ECGs as per above. I have reviewed all progress notes & records from the time of the patient's last office visit up to present. Assessment:     1. CRT-D   - original implant dual chamber PPM for AV block post op CABG 2015  - Upgrade 12/27/2016 to CRT-D ( new RV,LV lead, old RV lead capped)   - [employer]  - Normal device function    2.  HFrEF-->HFpEF  - Unclear if ischemic or nonischemic cardiomyopathy from available documentation.   - s/p CABG in 5/2015 at [facility], but EF was reported as mildly reduced preop and then 55-60% on TTE 4/29/14 by Dr. Iris Cardona, EF 48% preop at [facility], then 30% postop 7/2015. - Based on available documentation, it appears that a dual-chamber pacemaker was implanted on postoperative day #4 due to AV block and significant bradycardia. His intraoperative EF was normal, and thus no ICD was recommended. He has had a clear significant deterioration of his left ventricular systolic function in the past 2.5 years   - TTE 12/2017: see above   - GDMT:  Toprol XL, Entresto, lasix   - following with Cardiologist Dr Kyle Pollard    3. Coronary artery disease  - Status post CABG May of 2014 at [facility]. - receiving statin therapy   - no recurrent angina      4. Severe mitral regurgitation  - Status post mitral valve replacement May of 2014.  - TTE above, 2016     5.  Permanent atrial fibrillation  - Status post maze and left atrial appendage exclusion at the time of heart surgery, May 2014, massively dilated left atrium, very little chance of restoring sinus rhythm ever again  - anticoagulated with warfarin  - rates are controlled, receiving Toprol XL      6. Abdominal aortic aneurysm  - S/p EVAR   - Follows with Dr. Neeta Mullen at [place]:     1. Mr. Wilbert Cooper CRT-D function remains stable and programmed accordingly based on the above interrogation. He remains in permanent AF on coumadin with no bleeding issues. He denies HF symptoms and appears euvolemic. He is BiV paced 98%. 2. No changes were made in his medications today. 3. He will continue to send remote transmissions Q91 days x3 followed by a yearly office visit. 4. He was asked to call the office with concerns prior to follow-up. Thank you for allowing me to participate in their care. I have spent a total of 25 minutes with the patient and his family reviewing the above stated recommendations.  And a total of >50% of that time involved face-to-face time providing counseling and or coordination of care with the other providers    PARRIS Fowler-CNP, [address] [facility]    CC: Dr Cristine Montenegro

## 2020-02-03 ENCOUNTER — OFFICE VISIT (OUTPATIENT)
Dept: NON INVASIVE DIAGNOSTICS | Age: 85
End: 2020-02-03
Payer: MEDICARE

## 2020-02-03 VITALS
DIASTOLIC BLOOD PRESSURE: 80 MMHG | BODY MASS INDEX: 21.36 KG/M2 | WEIGHT: 149.2 LBS | RESPIRATION RATE: 16 BRPM | HEART RATE: 68 BPM | SYSTOLIC BLOOD PRESSURE: 130 MMHG | HEIGHT: 70 IN

## 2020-02-03 PROCEDURE — 1123F ACP DISCUSS/DSCN MKR DOCD: CPT | Performed by: NURSE PRACTITIONER

## 2020-02-03 PROCEDURE — 93284 PRGRMG EVAL IMPLANTABLE DFB: CPT | Performed by: NURSE PRACTITIONER

## 2020-02-03 PROCEDURE — 1036F TOBACCO NON-USER: CPT | Performed by: NURSE PRACTITIONER

## 2020-02-03 PROCEDURE — G8427 DOCREV CUR MEDS BY ELIG CLIN: HCPCS | Performed by: NURSE PRACTITIONER

## 2020-02-03 PROCEDURE — 4040F PNEUMOC VAC/ADMIN/RCVD: CPT | Performed by: NURSE PRACTITIONER

## 2020-02-03 PROCEDURE — G8420 CALC BMI NORM PARAMETERS: HCPCS | Performed by: NURSE PRACTITIONER

## 2020-02-03 PROCEDURE — G8484 FLU IMMUNIZE NO ADMIN: HCPCS | Performed by: NURSE PRACTITIONER

## 2020-02-03 PROCEDURE — 99214 OFFICE O/P EST MOD 30 MIN: CPT | Performed by: NURSE PRACTITIONER

## 2020-02-03 ASSESSMENT — ENCOUNTER SYMPTOMS: RESPIRATORY NEGATIVE: 1

## 2020-02-12 ENCOUNTER — NURSE ONLY (OUTPATIENT)
Dept: NON INVASIVE DIAGNOSTICS | Age: 85
End: 2020-02-12
Payer: MEDICARE

## 2020-02-12 PROCEDURE — 93295 DEV INTERROG REMOTE 1/2/MLT: CPT | Performed by: INTERNAL MEDICINE

## 2020-02-12 PROCEDURE — 9900000038 HC CARDIAC REHAB PHASE 3 - MONTHLY

## 2020-02-12 PROCEDURE — 93296 REM INTERROG EVL PM/IDS: CPT | Performed by: INTERNAL MEDICINE

## 2020-02-18 NOTE — PROGRESS NOTES
See [employer] [place] report. Remote monitoring reviewed over a 90 day period. End of 90 day monitoring period date of service 2.12.2020.

## 2020-02-24 ENCOUNTER — HOSPITAL ENCOUNTER (OUTPATIENT)
Dept: CARDIAC REHAB | Age: 85
Discharge: HOME | End: 2020-02-24

## 2020-03-27 RX ORDER — METOPROLOL SUCCINATE 25 MG/1
25 TABLET, EXTENDED RELEASE ORAL DAILY
Qty: 90 TABLET | Refills: 3 | Status: SHIPPED
Start: 2020-03-27 | End: 2021-04-01 | Stop reason: SDUPTHER

## 2020-05-13 ENCOUNTER — NURSE ONLY (OUTPATIENT)
Dept: NON INVASIVE DIAGNOSTICS | Age: 85
End: 2020-05-13
Payer: MEDICARE

## 2020-05-13 PROCEDURE — 93296 REM INTERROG EVL PM/IDS: CPT | Performed by: INTERNAL MEDICINE

## 2020-05-13 PROCEDURE — 93295 DEV INTERROG REMOTE 1/2/MLT: CPT | Performed by: INTERNAL MEDICINE

## 2020-05-13 NOTE — PROGRESS NOTES
See [employer] [place] report. Remote monitoring reviewed over a 90 day period. End of 90 day monitoring period date of service 5.13.2020.

## 2020-08-10 RX ORDER — SACUBITRIL AND VALSARTAN 24; 26 MG/1; MG/1
TABLET, FILM COATED ORAL
Qty: 60 TABLET | Refills: 0 | Status: SHIPPED
Start: 2020-08-10 | End: 2020-09-10 | Stop reason: SDUPTHER

## 2020-08-12 ENCOUNTER — NURSE ONLY (OUTPATIENT)
Dept: NON INVASIVE DIAGNOSTICS | Age: 85
End: 2020-08-12
Payer: MEDICARE

## 2020-08-12 PROCEDURE — 93295 DEV INTERROG REMOTE 1/2/MLT: CPT | Performed by: INTERNAL MEDICINE

## 2020-08-12 PROCEDURE — 93296 REM INTERROG EVL PM/IDS: CPT | Performed by: INTERNAL MEDICINE

## 2020-09-10 RX ORDER — SACUBITRIL AND VALSARTAN 24; 26 MG/1; MG/1
TABLET, FILM COATED ORAL
Qty: 60 TABLET | Refills: 0 | Status: SHIPPED
Start: 2020-09-10 | End: 2020-11-05 | Stop reason: SDUPTHER

## 2020-09-18 ENCOUNTER — OFFICE VISIT (OUTPATIENT)
Dept: CARDIOLOGY CLINIC | Age: 85
End: 2020-09-18
Payer: MEDICARE

## 2020-09-18 VITALS
BODY MASS INDEX: 20.32 KG/M2 | WEIGHT: 150 LBS | HEART RATE: 71 BPM | DIASTOLIC BLOOD PRESSURE: 84 MMHG | HEIGHT: 72 IN | SYSTOLIC BLOOD PRESSURE: 144 MMHG

## 2020-09-18 PROCEDURE — G8420 CALC BMI NORM PARAMETERS: HCPCS | Performed by: INTERNAL MEDICINE

## 2020-09-18 PROCEDURE — 93000 ELECTROCARDIOGRAM COMPLETE: CPT | Performed by: INTERNAL MEDICINE

## 2020-09-18 PROCEDURE — G8427 DOCREV CUR MEDS BY ELIG CLIN: HCPCS | Performed by: INTERNAL MEDICINE

## 2020-09-18 PROCEDURE — 4040F PNEUMOC VAC/ADMIN/RCVD: CPT | Performed by: INTERNAL MEDICINE

## 2020-09-18 PROCEDURE — 99214 OFFICE O/P EST MOD 30 MIN: CPT | Performed by: INTERNAL MEDICINE

## 2020-09-18 PROCEDURE — 1123F ACP DISCUSS/DSCN MKR DOCD: CPT | Performed by: INTERNAL MEDICINE

## 2020-09-18 PROCEDURE — 1036F TOBACCO NON-USER: CPT | Performed by: INTERNAL MEDICINE

## 2020-09-18 RX ORDER — SACUBITRIL AND VALSARTAN 24; 26 MG/1; MG/1
1 TABLET, FILM COATED ORAL 2 TIMES DAILY
Qty: 56 TABLET | Refills: 0 | COMMUNITY
Start: 2020-09-18 | End: 2021-09-02 | Stop reason: SDUPTHER

## 2020-09-18 NOTE — PROGRESS NOTES
 Hypertension    S/P CABG x 2    S/P MVR (mitral valve replacement)    S/P TVR (tricuspid valve repair)    Chronic systolic heart failure (HCC)    Pacemaker-dependent due to native cardiac rhythm insufficient to support life    ICD (implantable cardioverter-defibrillator), biventricular, in situ       Allergies   Allergen Reactions    Codeine Other (See Comments)     Upsets patient, patient becomes very agitated       Current Outpatient Medications   Medication Sig Dispense Refill    sacubitril-valsartan (ENTRESTO) 24-26 MG per tablet take 1 tablet by mouth twice a day 60 tablet 0    metoprolol succinate (TOPROL XL) 25 MG extended release tablet Take 1 tablet by mouth daily 90 tablet 3    furosemide (LASIX) 20 MG tablet take 1 tablet by mouth daily 90 tablet 3    acetaminophen (TYLENOL) 500 MG tablet Take 500 mg by mouth 2 times daily      warfarin (COUMADIN) 3 MG tablet 3 mg daily   0    Multiple Vitamins-Minerals (THERAPEUTIC MULTIVITAMIN-MINERALS) tablet Take 1 tablet by mouth daily      pravastatin (PRAVACHOL) 20 MG tablet Take 20 mg by mouth daily       No current facility-administered medications for this visit.         Social History     Socioeconomic History    Marital status:      Spouse name: Not on file    Number of children: Not on file    Years of education: Not on file    Highest education level: Not on file   Occupational History    Not on file   Social Needs    Financial resource strain: Not on file    Food insecurity     Worry: Not on file     Inability: Not on file    Transportation needs     Medical: Not on file     Non-medical: Not on file   Tobacco Use    Smoking status: Former Smoker     Packs/day: 1.50     Last attempt to quit: 3/17/2010     Years since quitting: 10.5    Smokeless tobacco: Never Used   Substance and Sexual Activity    Alcohol use: Yes     Comment: occ wine    Drug use: No    Sexual activity: Not on file   Lifestyle    Physical activity Days per week: Not on file     Minutes per session: Not on file    Stress: Not on file   Relationships    Social connections     Talks on phone: Not on file     Gets together: Not on file     Attends Nondenominational service: Not on file     Active member of club or organization: Not on file     Attends meetings of clubs or organizations: Not on file     Relationship status: Not on file    Intimate partner violence     Fear of current or ex partner: Not on file     Emotionally abused: Not on file     Physically abused: Not on file     Forced sexual activity: Not on file   Other Topics Concern    Not on file   Social History Narrative    Not on file       Family History   Problem Relation Age of Onset    Heart Disease Father        Review of Systems:  Heart: as above   Lungs: as above   Eyes: denies changes in vision or discharge. Ears: denies changes in hearing or pain. Nose: denies epistaxis or masses   Throat: denies sore throat or trouble swallowing. Neuro: denies numbness, tingling, tremors. Skin: denies rashes or itching. : denies hematuria, dysuria   GI: denies vomiting, diarrhea   Psych: denies mood changed, anxiety, depression. All other systems negative. Physical Exam   BP (!) 144/84   Pulse 71   Ht 6' (1.829 m)   Wt 150 lb (68 kg)   BMI 20.34 kg/m²   Constitutional: Oriented to person, place, and time. Well-developed and well-nourished. No distress. Head: Normocephalic and atraumatic. Eyes: EOM are normal. Pupils are equal, round, and reactive to light. Neck: Normal range of motion. Neck supple. No hepatojugular reflux and no JVD present. Carotid bruit is not present. No tracheal deviation present. No thyromegaly present. Cardiovascular: Normal rate, regular rhythm, ANJANA 2/6  Pulmonary/Chest: Effort normal and breath sounds normal. No respiratory distress. No wheezes. No rales. No tenderness. Abdominal: Soft. Bowel sounds are normal. No distension and no mass. No tenderness.  No rebound and no guarding. Musculoskeletal: Normal range of motion. No edema and no tenderness. Lymphadenopathy:   No cervical adenopathy. No groin adenopathy. Neurological: Alert and oriented to person, place, and time. Skin: Skin is warm and dry. No rash noted. Not diaphoretic. No erythema. Psychiatric: Normal mood and affect. Behavior is normal.     EKG:  V paced. Echo Summary 12/7/17:  Carlos Konig fraction is visually estimated at 55-60%.   No regional wall motion abnormalities seen.   Mild left ventricular concentric hypertrophy noted.   Normal right ventricle structure and function.   Left atrial volume index of 87 ml per meters squared BSA.   Normally functioning bioprosthetic artificial valve in mitral position. Mean transmitral gradient 4.3 mmHg.   Physiologic and/or trace mitral regurgitation is present.   Moderate tricuspid regurgitation.  RVSP is 32 mmHg. ASSESSMENT AND PLAN:  Patient Active Problem List   Diagnosis    Essential hypertension, benign    Severe mitral regurgitation    Atrial fibrillation (HCC)    Protein-calorie malnutrition, severe (HCC)    AAA (abdominal aortic aneurysm) (HCC)    CAD (coronary artery disease)    Hypertension    S/P CABG x 2    S/P MVR (mitral valve replacement)    S/P TVR (tricuspid valve repair)    Chronic systolic heart failure ([address]    Pacemaker-dependent due to native cardiac rhythm insufficient to support life    ICD (implantable cardioverter-defibrillator), biventricular, in situ     1. Edema/CMP/Acute on chronic systolic CHF:   LVEF 68-56% on 12/17 echo. (improved from severely declined)    Continue Entresto/Toprol/lasix PRN. Stable volume. 2. CABG/CAD: BB/statin. No ASA due to warfarin. 3. Chronic Afib: On warfarin. 4. MVR/TV repair: Echo with stable MVR function 12/17.     5. BiV-ICD: Following with device clinic. 6. AAA s/p Endovascular stent graft    Jose Manuel Wade D.O.   Cardiologist  Cardiology, [facility]) Physicians

## 2020-11-05 RX ORDER — SACUBITRIL AND VALSARTAN 24; 26 MG/1; MG/1
TABLET, FILM COATED ORAL
Qty: 180 TABLET | Refills: 1 | Status: SHIPPED
Start: 2020-11-05 | End: 2021-05-06 | Stop reason: SDUPTHER

## 2020-11-11 ENCOUNTER — NURSE ONLY (OUTPATIENT)
Dept: NON INVASIVE DIAGNOSTICS | Age: 85
End: 2020-11-11
Payer: MEDICARE

## 2020-11-11 PROCEDURE — 93296 REM INTERROG EVL PM/IDS: CPT | Performed by: INTERNAL MEDICINE

## 2020-11-11 PROCEDURE — 93295 DEV INTERROG REMOTE 1/2/MLT: CPT | Performed by: INTERNAL MEDICINE

## 2020-12-02 ENCOUNTER — TELEPHONE (OUTPATIENT)
Dept: NON INVASIVE DIAGNOSTICS | Age: 85
End: 2020-12-02

## 2020-12-02 NOTE — TELEPHONE ENCOUNTER
Alert on latitude for atrial pacing impedance out of range= 2024 ohms. Trending variable with similar readings noted in 2019. Has history of permanent AF. BVP 97%. Will review with Dr. Erica Valladares.      Pablito Rebolledo RN, BSN  [facility]

## 2021-01-20 ENCOUNTER — IMMUNIZATION (OUTPATIENT)
Dept: PRIMARY CARE CLINIC | Age: 86
End: 2021-01-20
Payer: MEDICARE

## 2021-01-20 DIAGNOSIS — Z23 NEED FOR VACCINATION: Primary | ICD-10-CM

## 2021-01-20 PROCEDURE — 91300 COVID-19, PFIZER VACCINE 30MCG/0.3ML DOSE: CPT | Performed by: NURSE PRACTITIONER

## 2021-01-20 PROCEDURE — 0001A COVID-19, PFIZER VACCINE 30MCG/0.3ML DOSE: CPT | Performed by: NURSE PRACTITIONER

## 2021-02-05 ENCOUNTER — OFFICE VISIT (OUTPATIENT)
Dept: NON INVASIVE DIAGNOSTICS | Age: 86
End: 2021-02-05
Payer: MEDICARE

## 2021-02-05 VITALS
DIASTOLIC BLOOD PRESSURE: 82 MMHG | WEIGHT: 152 LBS | SYSTOLIC BLOOD PRESSURE: 130 MMHG | RESPIRATION RATE: 16 BRPM | HEIGHT: 71 IN | BODY MASS INDEX: 21.28 KG/M2

## 2021-02-05 DIAGNOSIS — Z95.1 S/P CABG X 2: ICD-10-CM

## 2021-02-05 DIAGNOSIS — Z95.2 S/P MVR (MITRAL VALVE REPLACEMENT): ICD-10-CM

## 2021-02-05 DIAGNOSIS — Z98.890 S/P TVR (TRICUSPID VALVE REPAIR): ICD-10-CM

## 2021-02-05 DIAGNOSIS — I50.22 CHRONIC SYSTOLIC HEART FAILURE (HCC): ICD-10-CM

## 2021-02-05 DIAGNOSIS — T82.110A FRACTURED ATRIAL PACEMAKER LEAD WIRE, INITIAL ENCOUNTER: ICD-10-CM

## 2021-02-05 DIAGNOSIS — I71.40 ABDOMINAL AORTIC ANEURYSM (AAA) WITHOUT RUPTURE: ICD-10-CM

## 2021-02-05 DIAGNOSIS — Z95.810 ICD (IMPLANTABLE CARDIOVERTER-DEFIBRILLATOR), BIVENTRICULAR, IN SITU: Primary | ICD-10-CM

## 2021-02-05 DIAGNOSIS — Z95.0 PACEMAKER-DEPENDENT DUE TO NATIVE CARDIAC RHYTHM INSUFFICIENT TO SUPPORT LIFE: ICD-10-CM

## 2021-02-05 DIAGNOSIS — I49.8 PACEMAKER-DEPENDENT DUE TO NATIVE CARDIAC RHYTHM INSUFFICIENT TO SUPPORT LIFE: ICD-10-CM

## 2021-02-05 DIAGNOSIS — I48.20 CHRONIC ATRIAL FIBRILLATION (HCC): ICD-10-CM

## 2021-02-05 PROCEDURE — 93284 PRGRMG EVAL IMPLANTABLE DFB: CPT | Performed by: INTERNAL MEDICINE

## 2021-02-05 PROCEDURE — G8428 CUR MEDS NOT DOCUMENT: HCPCS | Performed by: INTERNAL MEDICINE

## 2021-02-05 PROCEDURE — 1123F ACP DISCUSS/DSCN MKR DOCD: CPT | Performed by: INTERNAL MEDICINE

## 2021-02-05 PROCEDURE — G8420 CALC BMI NORM PARAMETERS: HCPCS | Performed by: INTERNAL MEDICINE

## 2021-02-05 PROCEDURE — 99215 OFFICE O/P EST HI 40 MIN: CPT | Performed by: INTERNAL MEDICINE

## 2021-02-05 PROCEDURE — 1036F TOBACCO NON-USER: CPT | Performed by: INTERNAL MEDICINE

## 2021-02-05 PROCEDURE — G8484 FLU IMMUNIZE NO ADMIN: HCPCS | Performed by: INTERNAL MEDICINE

## 2021-02-05 PROCEDURE — 4040F PNEUMOC VAC/ADMIN/RCVD: CPT | Performed by: INTERNAL MEDICINE

## 2021-02-05 SDOH — HEALTH STABILITY: MENTAL HEALTH: HOW OFTEN DO YOU HAVE A DRINK CONTAINING ALCOHOL?: NOT ASKED

## 2021-02-05 SDOH — HEALTH STABILITY: MENTAL HEALTH: HOW MANY STANDARD DRINKS CONTAINING ALCOHOL DO YOU HAVE ON A TYPICAL DAY?: NOT ASKED

## 2021-02-05 NOTE — PROGRESS NOTES
Alton Kwok  1934  Date of Service: 2/5/2021      SUBJECTIVE: Alton Kwok presents to the office today with the chief complaint: Pacemaker/ICD evaluation    He has hx of MVR (#31Biocor), TV repair Sherrel Emil repair), bilateral RFA PVI, BO clipping, and CABG (L-OM1, V-PDA). He underwent DDD PPM implant on 5/29/14. He is s/p Upgrade of left-sided dual chamber pacemaker to left-sided biventricular ICD on 12/27/2016 preformed by Dr. Burns Kaylan    He also has a past medical history of atrial fibrillation, AAA, chronic jaundice, former smoker, CAD, hyperlipidemia, 2-vessel branched aortic endograft for juxtarenal abdominal aortic aneurysm on 2/18/15 with Dr. Lele Cochran. He is accompanied by his wife today. He denies any chest pain, shortness of breath or dizziness. He tries to stay active and is able to walk around and do some exercising with no intolerance.     Patient Active Problem List    Diagnosis Date Noted    Chronic systolic heart failure ([address]     Pacemaker-dependent due to native cardiac rhythm insufficient to support life     ICD (implantable cardioverter-defibrillator), biventricular, in situ      Overview Note:     [facility]       AAA (abdominal aortic aneurysm) ([address]      Overview Note:     follows up with Dr. Ailin Aquino 4/17/2013, see epic notes      CAD (coronary artery disease)     Hypertension      Overview Note:     last bp 155/77, controled      S/P CABG x 2     S/P MVR (mitral valve replacement)     S/P TVR (tricuspid valve repair)     Severe mitral regurgitation 05/02/2014    Atrial fibrillation ([address] 05/02/2014    Protein-calorie malnutrition, severe ([address] 05/02/2014    Essential hypertension, benign 05/01/2014       Current Outpatient Medications   Medication Sig Dispense Refill    sacubitril-valsartan (ENTRESTO) 24-26 MG per tablet take 1 tablet by mouth twice a day 180 tablet 1    metoprolol succinate (TOPROL XL) 25 MG extended release tablet Take 1 tablet by mouth daily 90 tablet 3    furosemide (LASIX) 20 MG tablet take 1 tablet by mouth daily 90 tablet 3    acetaminophen (TYLENOL) 500 MG tablet Take 500 mg by mouth 2 times daily      warfarin (COUMADIN) 3 MG tablet 3 mg daily Indications: take 1 tablet daily   0    Multiple Vitamins-Minerals (THERAPEUTIC MULTIVITAMIN-MINERALS) tablet Take 1 tablet by mouth daily      pravastatin (PRAVACHOL) 20 MG tablet Take 20 mg by mouth daily      sacubitril-valsartan (ENTRESTO) 24-26 MG per tablet Take 1 tablet by mouth 2 times daily Lot [id number]  EXP Date 07/2022 56 tablet 0     No current facility-administered medications for this visit. Allergies   Allergen Reactions    Codeine Other (See Comments)     Upsets patient, patient becomes very agitated           ROS:   Constitutional: Negative for fever, activity change and appetite change. HENT: Negative for epistaxis, difficulty swallowing. Eyes: Negative for blurred vision or double vision. Respiratory: Negative for cough, chest tightness, shortness of breath and wheezing. Cardiovascular: Negative for chest pain, palpitations and leg swelling. Gastrointestinal: Negative for abdominal pain, heartburn, or blood in stool. Genitourinary: Negative for hematuria, burning or frequency. Musculoskeletal: Negative for myalgias, stiffness, or swelling. Skin: Negative for rash, pain, or lumps. Neurological: Negative for syncope, seizures, or headaches. Psychiatric/Behavioral: Negative for confusion and agitation. The patient is not nervous/anxious. PHYSICAL EXAM:  Vitals:    02/05/21 1243   BP: 130/82   Resp: 16   Weight: 152 lb (68.9 kg)   Height: 5' 11\" (1.803 m)     .   Pacemaker/ICD Interrogation: see attached interrogation  Changed to VVI 70 due to chronic AF and increased impedance on atrial lead  RV R wave: 5.8 mV  Impedance: 411 ohms   Threshold: 1.7 V @ 1 ms  LV R wave:>25 mV  Impedance:575 ohms   Threshold:1.7 V @ 1 ms  Pacing: A: <1%%  RV: 96%  LV: to the office in 6 months for pacemaker/ICD follow-up. 2). He will send a remote transmission in 3 months. 3). Device mode change was made in the device settings today. 4). No changes were made in the patient's medications today. Thank you again for allowing me to participate in their care. I have spent a total of 40 minutes with the patient and his/her family reviewing the above stated recommendations. A total of >50% of that time involved face-to-face time providing counseling and or coordination of care with the other providers.         Moises Thomas M.D  Cardiac Electrophysiology  [facility]  [address] [facility]            CC:

## 2021-02-10 ENCOUNTER — NURSE ONLY (OUTPATIENT)
Dept: NON INVASIVE DIAGNOSTICS | Age: 86
End: 2021-02-10
Payer: MEDICARE

## 2021-02-10 ENCOUNTER — IMMUNIZATION (OUTPATIENT)
Dept: PRIMARY CARE CLINIC | Age: 86
End: 2021-02-10
Payer: MEDICARE

## 2021-02-10 DIAGNOSIS — I50.22 CHRONIC SYSTOLIC HEART FAILURE (HCC): ICD-10-CM

## 2021-02-10 DIAGNOSIS — Z95.0 PACEMAKER-DEPENDENT DUE TO NATIVE CARDIAC RHYTHM INSUFFICIENT TO SUPPORT LIFE: ICD-10-CM

## 2021-02-10 DIAGNOSIS — I48.20 CHRONIC ATRIAL FIBRILLATION (HCC): ICD-10-CM

## 2021-02-10 DIAGNOSIS — I49.8 PACEMAKER-DEPENDENT DUE TO NATIVE CARDIAC RHYTHM INSUFFICIENT TO SUPPORT LIFE: ICD-10-CM

## 2021-02-10 DIAGNOSIS — Z95.810 ICD (IMPLANTABLE CARDIOVERTER-DEFIBRILLATOR), BIVENTRICULAR, IN SITU: Primary | ICD-10-CM

## 2021-02-10 PROCEDURE — 0002A COVID-19, PFIZER VACCINE 30MCG/0.3ML DOSE: CPT | Performed by: NURSE PRACTITIONER

## 2021-02-10 PROCEDURE — 91300 COVID-19, PFIZER VACCINE 30MCG/0.3ML DOSE: CPT | Performed by: NURSE PRACTITIONER

## 2021-02-10 PROCEDURE — 93296 REM INTERROG EVL PM/IDS: CPT | Performed by: INTERNAL MEDICINE

## 2021-02-10 PROCEDURE — 93295 DEV INTERROG REMOTE 1/2/MLT: CPT | Performed by: INTERNAL MEDICINE

## 2021-02-22 NOTE — PROGRESS NOTES
See [employer] [place] report. Remote monitoring reviewed over a 90 day period. End of 90 day monitoring period date of service 2-.

## 2021-04-01 RX ORDER — METOPROLOL SUCCINATE 25 MG/1
25 TABLET, EXTENDED RELEASE ORAL DAILY
Qty: 90 TABLET | Refills: 3 | Status: SHIPPED
Start: 2021-04-01 | End: 2022-03-31 | Stop reason: SDUPTHER

## 2021-05-06 DIAGNOSIS — I42.9 CARDIOMYOPATHY, UNSPECIFIED TYPE (HCC): ICD-10-CM

## 2021-05-06 DIAGNOSIS — I25.810 CORONARY ARTERY DISEASE INVOLVING CORONARY BYPASS GRAFT OF NATIVE HEART WITHOUT ANGINA PECTORIS: ICD-10-CM

## 2021-05-06 DIAGNOSIS — I10 ESSENTIAL HYPERTENSION, BENIGN: Chronic | ICD-10-CM

## 2021-05-06 RX ORDER — SACUBITRIL AND VALSARTAN 24; 26 MG/1; MG/1
TABLET, FILM COATED ORAL
Qty: 180 TABLET | Refills: 3 | Status: SHIPPED
Start: 2021-05-06 | End: 2022-05-04 | Stop reason: SDUPTHER

## 2021-05-12 ENCOUNTER — NURSE ONLY (OUTPATIENT)
Dept: NON INVASIVE DIAGNOSTICS | Age: 86
End: 2021-05-12
Payer: MEDICARE

## 2021-05-12 DIAGNOSIS — Z95.810 ICD (IMPLANTABLE CARDIOVERTER-DEFIBRILLATOR), BIVENTRICULAR, IN SITU: Primary | ICD-10-CM

## 2021-05-12 DIAGNOSIS — I48.20 CHRONIC ATRIAL FIBRILLATION (HCC): ICD-10-CM

## 2021-05-30 PROCEDURE — 93296 REM INTERROG EVL PM/IDS: CPT | Performed by: INTERNAL MEDICINE

## 2021-05-30 PROCEDURE — 93295 DEV INTERROG REMOTE 1/2/MLT: CPT | Performed by: INTERNAL MEDICINE

## 2021-09-02 ENCOUNTER — OFFICE VISIT (OUTPATIENT)
Dept: NON INVASIVE DIAGNOSTICS | Age: 86
End: 2021-09-02
Payer: MEDICARE

## 2021-09-02 VITALS
HEART RATE: 70 BPM | BODY MASS INDEX: 22.37 KG/M2 | HEIGHT: 68 IN | RESPIRATION RATE: 18 BRPM | DIASTOLIC BLOOD PRESSURE: 86 MMHG | SYSTOLIC BLOOD PRESSURE: 128 MMHG | WEIGHT: 147.6 LBS

## 2021-09-02 DIAGNOSIS — Z95.0 PACEMAKER-DEPENDENT DUE TO NATIVE CARDIAC RHYTHM INSUFFICIENT TO SUPPORT LIFE: ICD-10-CM

## 2021-09-02 DIAGNOSIS — Z95.1 S/P CABG X 2: Primary | ICD-10-CM

## 2021-09-02 DIAGNOSIS — I49.8 PACEMAKER-DEPENDENT DUE TO NATIVE CARDIAC RHYTHM INSUFFICIENT TO SUPPORT LIFE: ICD-10-CM

## 2021-09-02 DIAGNOSIS — I50.22 CHRONIC SYSTOLIC HEART FAILURE (HCC): ICD-10-CM

## 2021-09-02 DIAGNOSIS — I34.0 SEVERE MITRAL REGURGITATION: ICD-10-CM

## 2021-09-02 PROCEDURE — G8427 DOCREV CUR MEDS BY ELIG CLIN: HCPCS | Performed by: INTERNAL MEDICINE

## 2021-09-02 PROCEDURE — 4040F PNEUMOC VAC/ADMIN/RCVD: CPT | Performed by: INTERNAL MEDICINE

## 2021-09-02 PROCEDURE — 99214 OFFICE O/P EST MOD 30 MIN: CPT | Performed by: INTERNAL MEDICINE

## 2021-09-02 PROCEDURE — G8420 CALC BMI NORM PARAMETERS: HCPCS | Performed by: INTERNAL MEDICINE

## 2021-09-02 PROCEDURE — 1036F TOBACCO NON-USER: CPT | Performed by: INTERNAL MEDICINE

## 2021-09-02 PROCEDURE — 1123F ACP DISCUSS/DSCN MKR DOCD: CPT | Performed by: INTERNAL MEDICINE

## 2021-09-02 NOTE — PROGRESS NOTES
Evie Winn  1934  Date of Service: 9/2/2021      SUBJECTIVE: Evie Winn presents to the office today with the chief complaint: Pacemaker/ICD evaluation    He has hx of MVR (#31Biocor), TV repair Gladys Lavender repair), bilateral RFA PVI, BO clipping, and CABG (L-OM1, V-PDA). He underwent DDD PPM implant on 5/29/14. He is s/p Upgrade of left-sided dual chamber pacemaker to left-sided biventricular ICD on 12/27/2016 preformed by Dr. Gilbert Paulino    He also has a past medical history of atrial fibrillation, AAA, chronic jaundice, former smoker, CAD, hyperlipidemia, 2-vessel branched aortic endograft for juxtarenal abdominal aortic aneurysm on 2/18/15 with Dr. Jaymie Mukherjee. 9/2/2021: He is accompanied by his wife today. His device site looks well healed and free from infection or erosion. He offers no complaints from a device POV. Currently denies any angina, syncope, dyspnea on exertion, paroxysmal nocturnal dyspnea and palpitations. The patient continues to be followed remotely.       Patient Active Problem List    Diagnosis Date Noted    Chronic systolic heart failure ([address]     Pacemaker-dependent due to native cardiac rhythm insufficient to support life     ICD (implantable cardioverter-defibrillator), biventricular, in situ      Overview Note:     BSCI       AAA (abdominal aortic aneurysm) ([address]      Overview Note:     follows up with Dr. Jose Moses 4/17/2013, see epic notes      CAD (coronary artery disease)     Hypertension      Overview Note:     last bp 155/77, controled      S/P CABG x 2     S/P MVR (mitral valve replacement)     S/P TVR (tricuspid valve repair)     Severe mitral regurgitation 05/02/2014    Atrial fibrillation ([address] 05/02/2014    Protein-calorie malnutrition, severe ([address] 05/02/2014    Essential hypertension, benign 05/01/2014       Current Outpatient Medications   Medication Sig Dispense Refill    sacubitril-valsartan (ENTRESTO) 24-26 MG per tablet take 1 tablet by mouth twice a day 180 tablet 3    metoprolol succinate (TOPROL XL) 25 MG extended release tablet Take 1 tablet by mouth daily 90 tablet 3    furosemide (LASIX) 20 MG tablet take 1 tablet by mouth daily 90 tablet 3    acetaminophen (TYLENOL) 500 MG tablet Take 500 mg by mouth 2 times daily      warfarin (COUMADIN) 3 MG tablet Indications: take 1 tablet daily 3 mg alternating with 2 mg or as directed  0    Multiple Vitamins-Minerals (THERAPEUTIC MULTIVITAMIN-MINERALS) tablet Take 1 tablet by mouth daily      pravastatin (PRAVACHOL) 20 MG tablet Take 20 mg by mouth daily       No current facility-administered medications for this visit. Allergies   Allergen Reactions    Codeine Other (See Comments)     Upsets patient, patient becomes very agitated           ROS:   Constitutional: Negative for fever, activity change and appetite change. HENT: Negative for epistaxis, difficulty swallowing. Eyes: Negative for blurred vision or double vision. Respiratory: Negative for cough, chest tightness, shortness of breath and wheezing. Cardiovascular: Negative for chest pain, palpitations and leg swelling. Gastrointestinal: Negative for abdominal pain, heartburn, or blood in stool. Genitourinary: Negative for hematuria, burning or frequency. Musculoskeletal: Negative for myalgias, stiffness, or swelling. Skin: Negative for rash, pain, or lumps. Neurological: Negative for syncope, seizures, or headaches. Psychiatric/Behavioral: Negative for confusion and agitation. The patient is not nervous/anxious. PHYSICAL EXAM:  Vitals:    09/02/21 1425   BP: 128/86   Pulse: 70   Resp: 18   Weight: 147 lb 9.6 oz (67 kg)   Height: 5' 8\" (1.727 m)     . Device Interrogation: 9/2/21   Underlying rhythm:  BiVP  Mode: VVI 70  Battery Voltage/Longevity:  10 months    Charge time: 12.7 seconds  Pacing: A: 0%  RV: 98%  LV: 98%  P wave: 1.9 mV  Impedance: >3000 ohms   Threshold: AF  RV R wave: paced  Impedance: 406 ohms Threshold: 1.4 V @ 1.0 ms  LV R wave:paced Impedance:527 ohms   Threshold:2.0 V @ 1.0 ms  Episodes: none  Reprogramming included: changed mode to VVIR 70/120 due to chronic AF and increased impedance on atrial lead  Overall device function is normal    All device programmable settings were evaluated per above and in the scanned document, along with iterative adjustments (capture thresholds) to assess and select the most appropriate final programming to provide for consistent delivery of the appropriate therapy and to verify function of the device. Summary:     Ila Womack  CRT-D function is normal    1. S/P CABG x 2    2. Chronic systolic heart failure ([address]    3. Severe mitral regurgitation    4. Pacemaker-dependent due to native cardiac rhythm insufficient to support life      1. Chronic systolic heart failure  - Unclear if ischemic or nonischemic cardiomyopathy from available documentation. Had CABG in 5/2015 at [facility], but EF was reported as mildly reduced preop and then 55-60% on TTE 4/29/14 by Dr. Lakhwinder Whitney, EF 48% preop at [facility], then 30% postop 7/2015. Based on available documentation, it appears that a dual-chamber pacemaker was implanted on postoperative day #4 due to AV block and significant bradycardia.   - S/p Upgrade of left-sided dual chamber pacemaker to left-sided biventricular ICD 12/27/2016 preformed by Dr Mine Boateng      2. Coronary artery disease  - Status post CABG May of 2014 at [facility].     3. Severe mitral regurgitation  - Status post mitral valve replacement May of 2014.     4. Permanent atrial fibrillation  - Status post maze and left atrial appendage exclusion at the time of heart surgery, May 2014, massively dilated left atrium, very little chance of restoring sinus rhythm ever again  - anticoagulated with warfarin     5. Abdominal aortic aneurysm    6.  Atrial Lead Malfunction/CRT-D  - Recent high impedance values; suspect fracture  - he is chronic AF so atrial lead is not necessary  - Device changed to VVIR 70/120 today   - Rate response turned on    Recommendations:     1). The patient will present back to the office in 6 months for CRT-D follow-up. 2). He will send a remote transmission in 3 months. 3). Device mode change was made in the device settings today. 4). No changes were made in the patient's medications today. Thank you again for allowing me to participate in their care. I have spent a total of 25 minutes with the patient and his/her family reviewing the above stated recommendations. A total of >50% of that time involved face-to-face time providing counseling and or coordination of care with the other providers.         Murtaza Thomas M.D  Cardiac Electrophysiology  [facility]  [address] [facility]            CC:

## 2022-03-11 ENCOUNTER — TELEPHONE (OUTPATIENT)
Dept: NON INVASIVE DIAGNOSTICS | Age: 87
End: 2022-03-11

## 2022-03-11 DIAGNOSIS — I48.19 PERSISTENT ATRIAL FIBRILLATION (HCC): Primary | ICD-10-CM

## 2022-03-11 NOTE — TELEPHONE ENCOUNTER
----- Message from Steven Mac MD sent at 3/11/2022 11:38 AM EST -----  Please schedule echocardiogram and then generator change. Thanks.  ----- Message -----  From: Melody Heimlich, RN  Sent: 3/11/2022  10:26 AM EST  To: Steven Mac MD    Please advise . Is a [facility] patient Murj alert today for 4% life of battery left. [place] CRT-D 99% Rv paced and LV paced. Last echo 2016 has appointment with Dr Sim Bradley 04/07/2022.

## 2022-03-22 ENCOUNTER — TELEPHONE (OUTPATIENT)
Dept: ADMINISTRATIVE | Age: 87
End: 2022-03-22

## 2022-03-22 ENCOUNTER — TELEPHONE (OUTPATIENT)
Dept: CARDIOLOGY | Age: 87
End: 2022-03-22

## 2022-03-22 NOTE — TELEPHONE ENCOUNTER
Echo scheduled. Reviewed Covid-19 checklist with the patient.     Electronically signed by Roxann Pettit on 3/22/2022 at 12:09 PM

## 2022-03-22 NOTE — TELEPHONE ENCOUNTER
Pt had to cancel his 4/7/22 yearly OV with Dr. Abbey Frances. Please call him back to r/s when the May schedule is available. Thank you.

## 2022-03-31 RX ORDER — METOPROLOL SUCCINATE 25 MG/1
25 TABLET, EXTENDED RELEASE ORAL DAILY
Qty: 90 TABLET | Refills: 0 | Status: SHIPPED
Start: 2022-03-31 | End: 2022-06-27 | Stop reason: SDUPTHER

## 2022-04-05 ENCOUNTER — TELEPHONE (OUTPATIENT)
Dept: NON INVASIVE DIAGNOSTICS | Age: 87
End: 2022-04-05

## 2022-04-05 NOTE — TELEPHONE ENCOUNTER
----- Message from Roxann Thakkar MD sent at 4/5/2022 10:27 AM EDT -----  3 days. Thanks.  ----- Message -----  From: Ciro Hester  Sent: 4/5/2022   9:56 AM EDT  To: Roxann Thakkar MD    Please advise how long patient should hold Coumadin, he is scheduled on April 27, 2022.  Thank you

## 2022-04-14 ENCOUNTER — TELEPHONE (OUTPATIENT)
Dept: CARDIOLOGY | Age: 87
End: 2022-04-14

## 2022-04-19 ENCOUNTER — HOSPITAL ENCOUNTER (OUTPATIENT)
Dept: CARDIOLOGY | Age: 87
Discharge: HOME | End: 2022-04-19
Payer: MEDICARE

## 2022-04-19 DIAGNOSIS — I48.19 PERSISTENT ATRIAL FIBRILLATION (HCC): ICD-10-CM

## 2022-04-19 LAB
LV EF: 50 %
LVEF MODALITY: NORMAL

## 2022-04-19 PROCEDURE — 93306 TTE W/DOPPLER COMPLETE: CPT | Performed by: PSYCHIATRY & NEUROLOGY

## 2022-05-04 DIAGNOSIS — I42.9 CARDIOMYOPATHY, UNSPECIFIED TYPE (HCC): ICD-10-CM

## 2022-05-04 DIAGNOSIS — I10 ESSENTIAL HYPERTENSION, BENIGN: Chronic | ICD-10-CM

## 2022-05-04 DIAGNOSIS — I25.810 CORONARY ARTERY DISEASE INVOLVING CORONARY BYPASS GRAFT OF NATIVE HEART WITHOUT ANGINA PECTORIS: ICD-10-CM

## 2022-05-04 RX ORDER — SACUBITRIL AND VALSARTAN 24; 26 MG/1; MG/1
TABLET, FILM COATED ORAL
Qty: 60 TABLET | Refills: 0 | Status: SHIPPED
Start: 2022-05-04 | End: 2022-06-01 | Stop reason: SDUPTHER

## 2022-05-05 ENCOUNTER — TELEPHONE (OUTPATIENT)
Dept: NON INVASIVE DIAGNOSTICS | Age: 87
End: 2022-05-05

## 2022-05-05 NOTE — TELEPHONE ENCOUNTER
----- Message from Nick Nichols MD sent at 5/4/2022  5:52 PM EDT -----  CRT-D REDD Alert    Device triggered REDD on May 03, 2022  Patient Notifier Delivered  Hx CABG, MVR, TV Repair, Chronic AF  Per medication list, pt taking Metoprolol T, Warfarin  No Arrhythmias  Presenting Rhythm: AF BiVp  RV & LV Lead trends are stable and within normal limits  BiVp 99%  Additional Notes:  Echo done on 3/11/2022. EF 50%. Please schedule generator change. Hold Warfarin 3 days before the procedure. Thanks.

## 2022-05-09 ENCOUNTER — OFFICE VISIT (OUTPATIENT)
Dept: CARDIOLOGY CLINIC | Age: 87
End: 2022-05-09
Payer: MEDICARE

## 2022-05-09 VITALS
HEIGHT: 68 IN | BODY MASS INDEX: 20.08 KG/M2 | DIASTOLIC BLOOD PRESSURE: 82 MMHG | RESPIRATION RATE: 16 BRPM | WEIGHT: 132.5 LBS | HEART RATE: 71 BPM | SYSTOLIC BLOOD PRESSURE: 122 MMHG

## 2022-05-09 DIAGNOSIS — I48.19 PERSISTENT ATRIAL FIBRILLATION (HCC): ICD-10-CM

## 2022-05-09 DIAGNOSIS — I42.9 CARDIOMYOPATHY, UNSPECIFIED TYPE (HCC): ICD-10-CM

## 2022-05-09 DIAGNOSIS — I10 ESSENTIAL HYPERTENSION, BENIGN: ICD-10-CM

## 2022-05-09 DIAGNOSIS — I25.810 CORONARY ARTERY DISEASE INVOLVING CORONARY BYPASS GRAFT OF NATIVE HEART WITHOUT ANGINA PECTORIS: Primary | ICD-10-CM

## 2022-05-09 PROCEDURE — 1123F ACP DISCUSS/DSCN MKR DOCD: CPT | Performed by: INTERNAL MEDICINE

## 2022-05-09 PROCEDURE — 1036F TOBACCO NON-USER: CPT | Performed by: INTERNAL MEDICINE

## 2022-05-09 PROCEDURE — 4040F PNEUMOC VAC/ADMIN/RCVD: CPT | Performed by: INTERNAL MEDICINE

## 2022-05-09 PROCEDURE — 93000 ELECTROCARDIOGRAM COMPLETE: CPT | Performed by: INTERNAL MEDICINE

## 2022-05-09 PROCEDURE — 99214 OFFICE O/P EST MOD 30 MIN: CPT | Performed by: INTERNAL MEDICINE

## 2022-05-09 PROCEDURE — G8420 CALC BMI NORM PARAMETERS: HCPCS | Performed by: INTERNAL MEDICINE

## 2022-05-09 PROCEDURE — G8427 DOCREV CUR MEDS BY ELIG CLIN: HCPCS | Performed by: INTERNAL MEDICINE

## 2022-05-09 RX ORDER — FUROSEMIDE 20 MG/1
20 TABLET ORAL DAILY PRN
Qty: 90 TABLET | Refills: 3 | Status: SHIPPED | OUTPATIENT
Start: 2022-05-09

## 2022-05-09 NOTE — PROGRESS NOTES
CHIEF COMPLAINT: SHARIF/Edema/MVR/TV repair/CAD-CABG    HISTORY OF PRESENT ILLNESS: Patient is a 80 y.o. male seen at the request of Hunter Roca MD.      Patient presents in follow up. Patient has an extensive cardiac history. Patient underwent surgery on 5/23/15 at which time he underwent MVR (#31Biocor), TV repair Ronald Beecham repair), bilateral RFA PVI, BO clipping, and CABG (L-OM1, V-PDA) by Dr. Chung Welch. On POD #4 he was seen by Dr. Madison Hill for bradycardia. In addition, he was in atrial flutter post operatively. He underwent DDD PPM implant on 5/29/14. He also has a past medical history of atrial fibrillation, AAA, chronic jaundice, former smoker, CAD and hyperlipidemia. Since then, he has also undergone placement of a 2-vessel branched aortic endograft for juxtarenal abdominal aortic aneurysm on 2/18/15 with Dr. Meghana Lawler. He was last seen by cardiology ([facility]) 7/16/14, EF at that visit 30%. Patient with no CP or SOB. Volume looks great. Stress 1/5/17 with fix inferoapical MI.      Past Medical History:   Diagnosis Date    AAA (abdominal aortic aneurysm) ([facility]     follows up with Dr. Jules Castañeda 4/17/2013, see epic notes    Arrhythmia     Arthritis     CAD (coronary artery disease)     CHF (congestive heart failure) ([facility]     History of tobacco use 4/17/2014    Hyperlipidemia with target low density lipoprotein (LDL) cholesterol less than 70 mg/dL 5/27/2014    Hypertension     last bp 155/77, controled    Jaundice, persistent 4/17/2014    etiology unknown at present    S/P CABG x 2     S/P MVR (mitral valve replacement)     BO exclusion    S/P TVR (tricuspid valve repair)     Swelling     at legs, on bp med with diuretic, recently 4/25/2014 has gone down    Tiredness 4/25/2014    recently tires easily, low stamina    Weight loss        Patient Active Problem List   Diagnosis    Essential hypertension, benign    Severe mitral regurgitation    Atrial fibrillation ([facility]    Protein-calorie malnutrition, severe ([facility]    [facility]    CAD (coronary artery disease)    Hypertension    S/P CABG x 2    S/P MVR (mitral valve replacement)    S/P TVR (tricuspid valve repair)    [facility] systolic [facility]    Pacemaker-dependent due to native cardiac rhythm insufficient to support life    ICD (implantable cardioverter-defibrillator), biventricular, in situ    Abnormal LFTs    Atelectasis    Bradycardia    Elevated bilirubin    Hyperlipidemia with target low density lipoprotein (LDL) cholesterol less than 70 mg/dL    Leukocytosis    Pleural effusion, left    Postoperative pain    Stress hyperglycemia    Volume overload    Weight loss, unintentional       Allergies   Allergen Reactions    Codeine Other (See Comments)     Upsets patient, patient becomes very agitated       Current Outpatient Medications   Medication Sig Dispense Refill    sacubitril-valsartan (ENTRESTO) 24-26 MG per tablet take 1 tablet by mouth twice a day 60 tablet 0    metoprolol succinate (TOPROL XL) 25 MG extended release tablet Take 1 tablet by mouth daily 90 tablet 0    furosemide (LASIX) 20 MG tablet take 1 tablet by mouth daily (Patient taking differently: Take 20 mg by mouth daily as needed ) 90 tablet 3    acetaminophen (TYLENOL) 500 MG tablet Take 500 mg by mouth 2 times daily      warfarin (COUMADIN) 3 MG tablet Indications: take 1 tablet daily 3 mg alternating with 2 mg or as directed  0    Multiple Vitamins-Minerals (THERAPEUTIC MULTIVITAMIN-MINERALS) tablet Take 1 tablet by mouth daily      pravastatin (PRAVACHOL) 20 MG tablet Take 20 mg by mouth daily       No current facility-administered medications for this visit.        Social History     Socioeconomic History    Marital status:      Spouse name: Not on file    Number of children: Not on file    Years of education: Not on file    Highest education level: Not on file   Occupational History    Not on file Tobacco Use    Smoking status: Former Smoker     Packs/day: 1.50     Years: 50.00     Pack years: 75.00     Quit date: 3/17/2010     Years since quittin.1    Smokeless tobacco: Never Used   Vaping Use    Vaping Use: Never used   Substance and Sexual Activity    Alcohol use: Yes     Comment: very little maybe 1-2 glasses of wine a year    Drug use: No    Sexual activity: Not on file   Other Topics Concern    Not on file   Social History Narrative    Not on file     Social Determinants of Health     Financial Resource Strain:     Difficulty of Paying Living Expenses: Not on file   Food Insecurity:     Worried About Running Out of Food in the Last Year: Not on file    Brittany of Food in the Last Year: Not on file   Transportation Needs:     Lack of Transportation (Medical): Not on file    Lack of Transportation (Non-Medical): Not on file   Physical Activity:     Days of Exercise per Week: Not on file    Minutes of Exercise per Session: Not on file   Stress:     Feeling of Stress : Not on file   Social Connections:     Frequency of Communication with Friends and Family: Not on file    Frequency of Social Gatherings with Friends and Family: Not on file    Attends Druze Services: Not on file    Active Member of [address] [place] [address] [employer] or Organizations: Not on file    Attends Club or Organization Meetings: Not on file    Marital Status: Not on file   Intimate Partner Violence:     Fear of Current or Ex-Partner: Not on file    Emotionally Abused: Not on file    Physically Abused: Not on file    Sexually Abused: Not on file   Housing Stability:     Unable to Pay for Housing in the Last Year: Not on file    Number of [place] in the Last Year: Not on file    Unstable Housing in the Last Year: Not on file       Family History   Problem Relation Age of Onset    Heart Disease Father        Review of Systems:  Heart: as above   Lungs: as above   Eyes: denies changes in vision or discharge.    Ears: denies changes in hearing or pain. Nose: denies epistaxis or masses   Throat: denies sore throat or trouble swallowing. Neuro: denies numbness, tingling, tremors. Skin: denies rashes or itching. : denies hematuria, dysuria   GI: denies vomiting, diarrhea   Psych: denies mood changed, anxiety, depression. All other systems negative. Physical Exam   /82   Pulse 71   Resp 16   Ht 5' 8\" (1.727 m)   Wt 132 lb 8 oz (60.1 kg)   BMI 20.15 kg/m²   Constitutional: Oriented to person, place, and time. Well-developed and well-nourished. No distress. Head: Normocephalic and atraumatic. Eyes: EOM are normal. Pupils are equal, round, and reactive to light. Neck: Normal range of motion. Neck supple. No hepatojugular reflux and no JVD present. Carotid bruit is not present. No tracheal deviation present. No thyromegaly present. Cardiovascular: Normal rate, regular rhythm, ANJANA 2/6  Pulmonary/Chest: Effort normal and breath sounds normal. No respiratory distress. No wheezes. No rales. No tenderness. Abdominal: Soft. Bowel sounds are normal. No distension and no mass. No tenderness. No rebound and no guarding. Musculoskeletal: Normal range of motion. No edema and no tenderness. Lymphadenopathy:   No cervical adenopathy. No groin adenopathy. Neurological: Alert and oriented to person, place, and time. Skin: Skin is warm and dry. No rash noted. Not diaphoretic. No erythema. Psychiatric: Normal mood and affect. Behavior is normal.     EKG:  V paced.     Echo Summary 12/7/17:  Collette Shaggy fraction is visually estimated at 55-60%.   No regional wall motion abnormalities seen.   Mild left ventricular concentric hypertrophy noted.   Normal right ventricle structure and function.   Left atrial volume index of 87 ml per meters squared BSA.   Normally functioning bioprosthetic artificial valve in mitral position. Mean transmitral gradient 4.3 mmHg.   Physiologic and/or trace mitral regurgitation is present.   Moderate tricuspid regurgitation.  RVSP is 32 mmHg. Echo Summary 4/19/2022:   Left ventricular size is grossly normal.   Abnormal (paradoxical) motion consistent with conduction abnormality. Ejection fraction is visually estimated at 50%. Normally functioning bioprosthetic artificial valve in mitral position. Tricuspid valve S/P repair. Moderate tricuspid regurgitation. RVSP is 29 mmHg. ASSESSMENT AND PLAN:  Patient Active Problem List   Diagnosis    Essential hypertension, benign    Severe mitral regurgitation    Atrial fibrillation (HCC)    Protein-calorie malnutrition, severe (HCC)    AAA (abdominal aortic aneurysm) (HCC)    CAD (coronary artery disease)    Hypertension    S/P CABG x 2    S/P MVR (mitral valve replacement)    S/P TVR (tricuspid valve repair)    Chronic systolic heart failure ([address]    Pacemaker-dependent due to native cardiac rhythm insufficient to support life    ICD (implantable cardioverter-defibrillator), biventricular, in situ    Abnormal LFTs    Atelectasis    Bradycardia    Elevated bilirubin    Hyperlipidemia with target low density lipoprotein (LDL) cholesterol less than 70 mg/dL    Leukocytosis    Pleural effusion, left    Postoperative pain    Stress hyperglycemia    Volume overload    Weight loss, unintentional     1. Edema/CMP/Acute on chronic systolic CHF:   LVEF 25% on 4/19/2022 echo. Continue Entresto/Toprol/lasix PRN. Stable volume. 2. CABG/CAD: BB/statin. No ASA due to warfarin. 3. Chronic Afib: On warfarin. 4. MVR/TV repair: Echo with stable MVR function 4/19/2022.     5. BiV-ICD: Following with device clinic. For PG change. 6. AAA s/p Endovascular stent graft    Carmine Morin D.O.   Cardiologist  Cardiology, [facility]

## 2022-05-13 ENCOUNTER — TELEPHONE (OUTPATIENT)
Dept: CARDIAC CATH/INVASIVE PROCEDURES | Age: 87
End: 2022-05-13

## 2022-05-13 NOTE — TELEPHONE ENCOUNTER
Reminded patient of scheduled procedure on  5/16   Instructions given and COVID questionnaire completed.

## 2022-05-16 ENCOUNTER — TELEPHONE (OUTPATIENT)
Dept: NON INVASIVE DIAGNOSTICS | Age: 87
End: 2022-05-16

## 2022-05-16 ENCOUNTER — ANESTHESIA EVENT (OUTPATIENT)
Dept: CARDIAC CATH/INVASIVE PROCEDURES | Age: 87
End: 2022-05-16

## 2022-05-16 ENCOUNTER — ANESTHESIA (OUTPATIENT)
Dept: CARDIAC CATH/INVASIVE PROCEDURES | Age: 87
End: 2022-05-16

## 2022-05-16 ENCOUNTER — HOSPITAL ENCOUNTER (OUTPATIENT)
Dept: CARDIAC CATH/INVASIVE PROCEDURES | Age: 87
Discharge: HOME | End: 2022-05-16
Payer: MEDICARE

## 2022-05-16 ENCOUNTER — HOSPITAL ENCOUNTER (OUTPATIENT)
Dept: GENERAL RADIOLOGY | Age: 87
Discharge: HOME | End: 2022-05-18
Payer: MEDICARE

## 2022-05-16 VITALS
RESPIRATION RATE: 18 BRPM | OXYGEN SATURATION: 98 % | HEIGHT: 71 IN | TEMPERATURE: 97.1 F | DIASTOLIC BLOOD PRESSURE: 89 MMHG | WEIGHT: 133 LBS | HEART RATE: 70 BPM | SYSTOLIC BLOOD PRESSURE: 154 MMHG | BODY MASS INDEX: 18.62 KG/M2

## 2022-05-16 LAB
ANION GAP SERPL CALCULATED.3IONS-SCNC: 10 MMOL/L (ref 7–16)
BASOPHILS ABSOLUTE: 0.05 E9/L (ref 0–0.2)
BASOPHILS RELATIVE PERCENT: 0.5 % (ref 0–2)
BUN BLDV-MCNC: 25 MG/DL (ref 6–23)
CALCIUM SERPL-MCNC: 9.1 MG/DL (ref 8.6–10.2)
CHLORIDE BLD-SCNC: 101 MMOL/L (ref 98–107)
CO2: 25 MMOL/L (ref 22–29)
CREAT SERPL-MCNC: 0.9 MG/DL (ref 0.7–1.2)
EOSINOPHILS ABSOLUTE: 0.21 E9/L (ref 0.05–0.5)
EOSINOPHILS RELATIVE PERCENT: 2.3 % (ref 0–6)
GFR AFRICAN AMERICAN: >60
GFR NON-AFRICAN AMERICAN: >60 ML/MIN/1.73
GLUCOSE BLD-MCNC: 96 MG/DL (ref 74–99)
HCT VFR BLD CALC: 38.2 % (ref 37–54)
HEMOGLOBIN: 12.3 G/DL (ref 12.5–16.5)
IMMATURE GRANULOCYTES #: 0.06 E9/L
IMMATURE GRANULOCYTES %: 0.6 % (ref 0–5)
INR BLD: 1.4
LYMPHOCYTES ABSOLUTE: 0.94 E9/L (ref 1.5–4)
LYMPHOCYTES RELATIVE PERCENT: 10.1 % (ref 20–42)
MAGNESIUM: 2.2 MG/DL (ref 1.6–2.6)
MCH RBC QN AUTO: 30.4 PG (ref 26–35)
MCHC RBC AUTO-ENTMCNC: 32.2 % (ref 32–34.5)
MCV RBC AUTO: 94.3 FL (ref 80–99.9)
MONOCYTES ABSOLUTE: 0.57 E9/L (ref 0.1–0.95)
MONOCYTES RELATIVE PERCENT: 6.1 % (ref 2–12)
NEUTROPHILS ABSOLUTE: 7.45 E9/L (ref 1.8–7.3)
NEUTROPHILS RELATIVE PERCENT: 80.4 % (ref 43–80)
PDW BLD-RTO: 14.4 FL (ref 11.5–15)
PLATELET # BLD: 182 E9/L (ref 130–450)
PMV BLD AUTO: 10.4 FL (ref 7–12)
POTASSIUM SERPL-SCNC: 4.8 MMOL/L (ref 3.5–5)
PROTHROMBIN TIME: 15.9 SEC (ref 9.3–12.4)
RBC # BLD: 4.05 E12/L (ref 3.8–5.8)
SODIUM BLD-SCNC: 136 MMOL/L (ref 132–146)
WBC # BLD: 9.3 E9/L (ref 4.5–11.5)

## 2022-05-16 PROCEDURE — 2720000010 HC SURG SUPPLY STERILE

## 2022-05-16 PROCEDURE — 2500000003 HC RX 250 WO HCPCS: Performed by: NURSE ANESTHETIST, CERTIFIED REGISTERED

## 2022-05-16 PROCEDURE — 2709999900 HC NON-CHARGEABLE SUPPLY

## 2022-05-16 PROCEDURE — 33264 RMVL & RPLCMT DFB GEN MLT LD: CPT

## 2022-05-16 PROCEDURE — C1889 IMPLANT/INSERT DEVICE, NOC: HCPCS

## 2022-05-16 PROCEDURE — 6360000002 HC RX W HCPCS: Performed by: NURSE ANESTHETIST, CERTIFIED REGISTERED

## 2022-05-16 PROCEDURE — 2580000003 HC RX 258: Performed by: NURSE ANESTHETIST, CERTIFIED REGISTERED

## 2022-05-16 PROCEDURE — 80048 BASIC METABOLIC PNL TOTAL CA: CPT

## 2022-05-16 PROCEDURE — 2500000003 HC RX 250 WO HCPCS

## 2022-05-16 PROCEDURE — 36415 COLL VENOUS BLD VENIPUNCTURE: CPT

## 2022-05-16 PROCEDURE — 85610 PROTHROMBIN TIME: CPT

## 2022-05-16 PROCEDURE — 3700000001 HC ADD 15 MINUTES (ANESTHESIA)

## 2022-05-16 PROCEDURE — 93005 ELECTROCARDIOGRAM TRACING: CPT | Performed by: INTERNAL MEDICINE

## 2022-05-16 PROCEDURE — 71045 X-RAY EXAM CHEST 1 VIEW: CPT

## 2022-05-16 PROCEDURE — C1882 AICD, OTHER THAN SING/DUAL: HCPCS

## 2022-05-16 PROCEDURE — 6360000002 HC RX W HCPCS

## 2022-05-16 PROCEDURE — 33264 RMVL & RPLCMT DFB GEN MLT LD: CPT | Performed by: INTERNAL MEDICINE

## 2022-05-16 PROCEDURE — 85025 COMPLETE CBC W/AUTO DIFF WBC: CPT

## 2022-05-16 PROCEDURE — 2580000003 HC RX 258

## 2022-05-16 PROCEDURE — 83735 ASSAY OF MAGNESIUM: CPT

## 2022-05-16 PROCEDURE — 3700000000 HC ANESTHESIA ATTENDED CARE

## 2022-05-16 RX ORDER — SODIUM CHLORIDE 9 MG/ML
INJECTION, SOLUTION INTRAVENOUS PRN
Status: DISCONTINUED | OUTPATIENT
Start: 2022-05-16 | End: 2022-05-17 | Stop reason: HOSPADM

## 2022-05-16 RX ORDER — SODIUM CHLORIDE 9 MG/ML
INJECTION, SOLUTION INTRAVENOUS CONTINUOUS PRN
Status: DISCONTINUED | OUTPATIENT
Start: 2022-05-16 | End: 2022-05-16 | Stop reason: SDUPTHER

## 2022-05-16 RX ORDER — CEPHALEXIN 500 MG/1
500 CAPSULE ORAL 3 TIMES DAILY
Qty: 9 CAPSULE | Refills: 0 | Status: SHIPPED | OUTPATIENT
Start: 2022-05-16 | End: 2022-05-19

## 2022-05-16 RX ORDER — SODIUM CHLORIDE 0.9 % (FLUSH) 0.9 %
5-40 SYRINGE (ML) INJECTION EVERY 12 HOURS SCHEDULED
Status: DISCONTINUED | OUTPATIENT
Start: 2022-05-16 | End: 2022-05-17 | Stop reason: HOSPADM

## 2022-05-16 RX ORDER — CEFAZOLIN SODIUM 1 G/3ML
INJECTION, POWDER, FOR SOLUTION INTRAMUSCULAR; INTRAVENOUS PRN
Status: DISCONTINUED | OUTPATIENT
Start: 2022-05-16 | End: 2022-05-16 | Stop reason: SDUPTHER

## 2022-05-16 RX ORDER — ONDANSETRON 2 MG/ML
4 INJECTION INTRAMUSCULAR; INTRAVENOUS EVERY 6 HOURS PRN
Status: DISCONTINUED | OUTPATIENT
Start: 2022-05-16 | End: 2022-05-17 | Stop reason: HOSPADM

## 2022-05-16 RX ORDER — SODIUM CHLORIDE 0.9 % (FLUSH) 0.9 %
5-40 SYRINGE (ML) INJECTION PRN
Status: DISCONTINUED | OUTPATIENT
Start: 2022-05-16 | End: 2022-05-17 | Stop reason: HOSPADM

## 2022-05-16 RX ORDER — PROPOFOL 10 MG/ML
INJECTION, EMULSION INTRAVENOUS PRN
Status: DISCONTINUED | OUTPATIENT
Start: 2022-05-16 | End: 2022-05-16 | Stop reason: SDUPTHER

## 2022-05-16 RX ORDER — FENTANYL CITRATE 50 UG/ML
INJECTION, SOLUTION INTRAMUSCULAR; INTRAVENOUS PRN
Status: DISCONTINUED | OUTPATIENT
Start: 2022-05-16 | End: 2022-05-16 | Stop reason: SDUPTHER

## 2022-05-16 RX ORDER — PHENYLEPHRINE HCL IN 0.9% NACL 1 MG/10 ML
SYRINGE (ML) INTRAVENOUS PRN
Status: DISCONTINUED | OUTPATIENT
Start: 2022-05-16 | End: 2022-05-16 | Stop reason: SDUPTHER

## 2022-05-16 RX ADMIN — Medication 150 MCG: at 08:05

## 2022-05-16 RX ADMIN — PROPOFOL 20 MG: 10 INJECTION, EMULSION INTRAVENOUS at 07:58

## 2022-05-16 RX ADMIN — FENTANYL CITRATE 25 MCG: 50 INJECTION, SOLUTION INTRAMUSCULAR; INTRAVENOUS at 07:58

## 2022-05-16 RX ADMIN — PROPOFOL 30 MCG/KG/MIN: 10 INJECTION, EMULSION INTRAVENOUS at 07:54

## 2022-05-16 RX ADMIN — FENTANYL CITRATE 50 MCG: 50 INJECTION, SOLUTION INTRAMUSCULAR; INTRAVENOUS at 07:48

## 2022-05-16 RX ADMIN — SODIUM CHLORIDE: 9 INJECTION, SOLUTION INTRAVENOUS at 07:33

## 2022-05-16 RX ADMIN — PROPOFOL 30 MG: 10 INJECTION, EMULSION INTRAVENOUS at 07:50

## 2022-05-16 RX ADMIN — CEFAZOLIN 2000 MG: 1 INJECTION, POWDER, FOR SOLUTION INTRAMUSCULAR; INTRAVENOUS at 07:45

## 2022-05-16 ASSESSMENT — LIFESTYLE VARIABLES: SMOKING_STATUS: 1

## 2022-05-16 NOTE — ANESTHESIA PRE PROCEDURE
mouth daily       No current facility-administered medications for this encounter. Allergies:     Allergies   Allergen Reactions    Codeine Other (See Comments)     Upsets patient, patient becomes very agitated       Problem List:    Patient Active Problem List   Diagnosis Code    Essential hypertension, benign I10    Severe mitral regurgitation I34.0    Atrial fibrillation (HCC) I48.91    Protein-calorie malnutrition, severe ([facility] E43    AAA (abdominal aortic aneurysm) (HCC) I71.4    CAD (coronary artery disease) I25.10    Hypertension I10    S/P CABG x 2 Z95.1    S/P MVR (mitral valve replacement) Z95.2    S/P TVR (tricuspid valve repair) Z98.890    Chronic systolic heart failure (HCC) I50.22    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 Severino Landeros 4/17/2013, see epic notes    Arrhythmia     Arthritis     CAD (coronary artery disease)     CHF (congestive heart failure) ([facility]     History of tobacco use 4/17/2014    Hyperlipidemia with target low density lipoprotein (LDL) cholesterol less than 70 mg/dL 5/27/2014    Hypertension     last bp 155/77, controled    Jaundice, persistent 4/17/2014    etiology unknown at present    S/P CABG x 2     S/P MVR (mitral valve replacement)     BO exclusion    S/P TVR (tricuspid valve repair)     Swelling     at legs, on bp med with diuretic, recently 2014 has gone down    Tiredness 2014    recently tires easily, low stamina    Weight loss        Past Surgical History:        Procedure Laterality Date    ABDOMINAL AORTIC ANEURYSM REPAIR, ENDOVASCULAR  2015    [facility] clinic done through research-thru groin, stent and 2 branches to kidney    CARDIAC DEFIBRILLATOR PLACEMENT  2016    Upgrade D-PPM to biv ICD    ([employer])   Dr. Tremayne Abrams  2014    triple bypass and mitral valve repl and [facility]    CORONARY ARTERY BYPASS GRAFT      DIAGNOSTIC CARDIAC CATH LAB PROCEDURE      ELBOW SURGERY      to treat spider bite     PACEMAKER INSERTION      PILONIDAL CYST EXCISION         Social History:    Social History     Tobacco Use    Smoking status: Former Smoker     Packs/day: 1.50     Years: 50.00     Pack years: 75.00     Quit date: 3/17/2010     Years since quittin.1    Smokeless tobacco: Never Used   Substance Use Topics    Alcohol use: Yes     Comment: very little maybe 1-2 glasses of wine a year                                Counseling given: Not Answered      Vital Signs (Current):   Vitals:    22 0638   BP: (!) 154/89   Pulse: 70   Resp: 18   Temp: 36.2 °C (97.1 °F)   TempSrc: Temporal   SpO2: 98%   Weight: 133 lb (60.3 kg)   Height: 5' 11\" (1.803 m)                                              BP Readings from Last 3 Encounters:   22 (!) 154/89   22 122/82   21 128/86       NPO Status:  >8 hrs                                                                               BMI:   Wt Readings from Last 3 Encounters:   22 133 lb (60.3 kg)   22 132 lb 8 oz (60.1 kg)   21 147 lb 9.6 oz (67 kg)     Body mass index is 18.55 kg/m².     CBC:   Lab Results   Component Value Date    WBC 9.3 2022    RBC 4.05 2022    HGB 12.3 2022    HCT 38.2 2022    MCV 94.3 2022    RDW 14.4 05/16/2022     05/16/2022       CMP:   Lab Results   Component Value Date     12/28/2016    K 4.4 12/28/2016    CL 98 12/28/2016    CO2 25 12/28/2016    BUN 22 12/28/2016    CREATININE 0.9 12/28/2016    GFRAA >60 12/28/2016    LABGLOM >60 12/28/2016    GLUCOSE 102 12/28/2016    PROT 7.9 07/25/2016    CALCIUM 8.1 12/28/2016    BILITOT 2.7 07/25/2016    ALKPHOS 116 07/25/2016    AST 24 07/25/2016    ALT 11 07/25/2016       POC Tests: No results for input(s): POCGLU, POCNA, POCK, POCCL, POCBUN, POCHEMO, POCHCT in the last 72 hours.     Coags:   Lab Results   Component Value Date    PROTIME 15.9 05/16/2022    INR 1.4 05/16/2022    APTT 43.0 12/04/2015       HCG (If Applicable): No results found for: PREGTESTUR, PREGSERUM, HCG, HCGQUANT     ABGs: No results found for: PHART, PO2ART, RBI5XIX, DWV0FPB, BEART, T8IVFPCE     Type & Screen (If Applicable):  No results found for: LABABO, LABRH    Drug/Infectious Status (If Applicable):  No results found for: HIV, HEPCAB    COVID-19 Screening (If Applicable): No results found for: COVID19        Anesthesia Evaluation  Patient summary reviewed and Nursing notes reviewed no history of anesthetic complications:   Airway: Mallampati: II  TM distance: >3 FB   Neck ROM: full  Mouth opening: > = 3 FB Dental: normal exam         Pulmonary:normal exam  breath sounds clear to auscultation  (+) current smoker                          ROS comment: H/o atelectasis, L pleural effusion   Cardiovascular:  Exercise tolerance: good (>4 METS),   (+) hypertension:, valvular problems/murmurs (MVR, TVr):, pacemaker:, CAD:, CABG/stent (CABG x 2):, dysrhythmias (bradycardia): atrial fibrillation, CHF: systolic, hyperlipidemia      ECG reviewed  Rhythm: regular  Rate: normal  Echocardiogram reviewed    Cleared by cardiology           ROS comment: BO clipping    PE comment: AR   Neuro/Psych:   Negative Neuro/Psych ROS              GI/Hepatic/Renal: Neg GI/Hepatic/Renal ROS Endo/Other:    (+) blood dyscrasia: anticoagulation therapy, arthritis:., .          Pt had no PAT visit       Abdominal:       Abdomen: soft. Vascular:           ROS comment: AAA endograft repair. Other Findings:           Anesthesia Plan      MAC     ASA 4       Induction: intravenous. Anesthetic plan and risks discussed with patient. Use of blood products discussed with patient whom. Plan discussed with attending.                   Holland Nguyen, PARRIS - CRNA   5/16/2022

## 2022-05-16 NOTE — OP NOTE
[address] [facility] Electrophysiology  Procedure Report  PATIENT: Rashid Brown  MEDICAL RECORD NUMBER: [id number]  DATE OF PROCEDURE:  5/16/2022  ATTENDING ELECTROPHYSIOLOGIST:  Davin Villalobos MD  REFERRING PHYSICIAN: Dr. Mony Hughes    Procedure Performed:  1. Generator Replacement of a Bi-Ventricular Implantable Cardioverter Defibrillator ([employer])    Indication for Procedure:  1. 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 They had an BiV-ICD whose battery had reached the elective replacement interval.      Flouroscopy: 0 min  Complications: none immediately apparent  EBL: minimal  Specimens: none  Contrast: 0 cc    FINDINGS:  Implanted device information: 1. New Pulse Generator is a [employer]. Serial # [id number]  Placement: Left pectoral subcutaneous  Date of implant: 5/16/2022  2. Old Pulse Generator is a [employer]. Serial # [id number]  Placement: Left pectoral subcutaneous  Date of explant: 5/16/2022  Date of implant: 12/27/2016  3. Right atrial lead parameters are as follows:  [employer] Model # [id number]. Serial # [id number]  Lead position: RA  Date of implant: 5/29/2014  P-waves: 1.4 mV  Pacing threshold: AF  Impedance: 3000 ohms. 4. Right ventricular lead parameters are as follows:  [address]. Serial # [id number]  Lead position: RV  Date of implant: 12/27/2016  R-waves: Paced  Pacing threshold: 2.0 V at 1.0 ms. Impedance: 380 (59) ohms. 5. Left ventricular lead parameters are as follows:  [address]. Serial # [id number]  Lead position: CS  Date of implant: 12/27/2016  R-waves: Paced  Pacing threshold: 2.4 V at 1.0 ms (LV2 to RV). Impedance: 377 ohms. 6. Bradycardia parameters:  Mode: VVIR  Base Rate: 70  Max Tracking Rate: 120  7.  Tachycardia zone configuration:  The patient is programmed with 2 tachycardia zones  VT: 185 beats/min  SVT discriminators are turned on. ATP   41 J x 6  VF: 250 beats/min  41 J x 8    DETAILS OF THE OPERATION: The risks, benefits, alternatives of the procedure were explained to patient and family. They consented and agreed to proceed. Written consent was obtained in the chart. Blood products are not routinely needed for such procedures. The patient was brought to the Electrophysiology lab in a fasting and non-sedated state. The patient had electrocardiographic and hemodynamic monitoring equipment placed. During the case the patient was under the care of an anesthesiologist/CRNA team for sedation and hemodynamic monitoring. A final time out was performed prior to beginning the procedure. The patient was prepped and draped in usual sterile fashion. Twenty mL of 2% lidocaine was used to anesthetize the left pectoral area. Using a plasma-blade an incision was made over the patient's previous incision. Using combination of scalpel and electrocautery, we dissected down to the device pocket. The device and was then removed from the pocket and the leads were disconnected from the header of the device. The leads were visually inspected and lead parameters were checked and deemed to adequate. The patient's previous device pocket was then extended inferiorly and medially to approximate the size of the new device. Hemostasis was achieved with electrocautery and confirmed visually. The device pocket was then irrigated with antibiotic vancomycin solution. The leads were then attached to the appropriate binding posts on the header of the device. The set screws were then tightened with a torque wrench. Tug tests were performed on all leads to insure they are adequately fixated. The device and the leads were then placed within the device pocket inside of a Tyrx absorbable anti-microbial pouch. Lead parameters were then rechecked via the device and deemed to be adequate.  Surgiflo was injected into the pocket to ensure hemostasis. The incision was closed with 3 layers of absorbable suture. The deepest of which was a 2-0 interrupted stitches followed by a 2nd layer of 3-0 running stitch performed perpendicular to the deep layer and, lastly, a 4-0 subcuticular stitch. The skin was then prepped with benzoin solution, Steri-Strips, and island Aquacel dressing were then applied. At the end of the case, the patient was hemodynamically stable and under the care of an anesthesiologist, the patient was brought back to the recovery area for post procedural monitoring. ASSESSMENT:  Successful generator change of a [employer] bi-ventricular implantable cardioverter defibrillator for elective replacement indicator of congestive heart failure NYHA Class III and primary prevention of sudden cardiac death. RECOMMENDATIONS:  1. Post-procedural monitoring in the recovery area  2. EKG and portable chest x-ray post procedure  3. The patient may be discharged to home when hemodynamically stable, awake, tolerating oral intake, ambulating and has adequate pain control  4. The patient will receive a 3-day course of oral antibiotics. 5.  The patient is to follow-up in device clinic within 2 weeks upon discharge  6. The patient is advised follow all post-operative device and wound care instructions as per the information provided in the pre-operative visit and discharge instructions.     Tyra Moreno MD  Cardiac Electrophysiology  [address]  [facility]  7:41 AM  5/16/2022

## 2022-05-16 NOTE — ANESTHESIA POSTPROCEDURE EVALUATION
Department of Anesthesiology  Postprocedure Note    Patient: Paulette Mcneil  MRN: [id number]  YOB: 1934  Date of evaluation: 5/16/2022  Time:  10:18 AM     Procedure Summary     Date: 05/16/22 [place] [facility] [place]    Anesthesia Start: 3124 Anesthesia Stop: 2619    Procedure: BI-V ICD TRANS V W/ ANES Diagnosis: Other persistent atrial fibrillation    Scheduled Providers: PARRIS Chirinos CRNA; Hanna Carbajal MD Responsible Provider: Hanna Carbajal MD    Anesthesia Type: MAC ASA Status: 4          Anesthesia Type: No value filed. Liliana Phase I:      Liliana Phase II:      Last vitals: Reviewed and per EMR flowsheets.        Anesthesia Post Evaluation    Patient location during evaluation: PACU  Patient participation: complete - patient participated  Level of consciousness: awake  Pain score: 0  Airway patency: patent  Nausea & Vomiting: no nausea  Complications: no  Cardiovascular status: hemodynamically stable  Respiratory status: acceptable  Hydration status: stable  Multimodal analgesia pain management approach

## 2022-05-16 NOTE — H&P
Rosa Elena Mason  1934  Date of Service: 5/16/2022      SUBJECTIVE: Rosa Elena Mason is seen in the hospital for CRT-D pulse generator change. He has hx of MVR (#31Biocor), TV repair Trula Pounds repair), bilateral RFA PVI, BO clipping, and CABG (L-OM1, V-PDA). He underwent DDD PPM implant on 5/29/14. He is s/p Upgrade of left-sided dual chamber pacemaker to left-sided biventricular ICD on 12/27/2016 preformed by Dr. Luanne Peabody. He also has a past medical history of atrial fibrillation, AAA, chronic jaundice, former smoker, CAD, hyperlipidemia, 2-vessel branched aortic endograft for juxtarenal abdominal aortic aneurysm on 2/18/15 with Dr. Dante Wesley. He was found to have REDD of CRT-D pulse generator on 5/3/22. Risks, benefits, and alternatives of CRT-D generator replacement were discussed in detail today. These risks include but are not limited to bleeding,infection, lead damage or discovery of a non-functional lead which would require lead revision and risks of blood clot, pneumothorax, hemothorax, cardiac perforation and tamponade, lead dislodgement, contrast induced nephropathy leading to short or even long term dialysis, vascular injury requiring emergent surgical repair, stroke and even death. The patient understands these risks and agrees to proceed today. Patient Active Problem List    Diagnosis Date Noted    Leukocytosis 02/19/2015     Priority: Medium     Overview Note:     Formatting of this note might be different from the original.  2/19/2015 White count elevation likely reactive. Will monitor.       Hyperlipidemia with target low density lipoprotein (LDL) cholesterol less than 70 mg/dL 05/27/2014     Priority: Medium     Overview Note:     Formatting of this note might be different from the original.  Preop LDL 73, not on statin,  Hold off statin given Hx of tbili      Atelectasis 05/26/2014     Priority: Medium     Overview Note:     Formatting of this note might be different from the original.  On RA,lost 4 lbs overnight, CXR shows b/l pleural effusions L>R- improved on last CXR, on lasix. Enc OOB, amb, C&DB, PEP  Formatting of this note might be different from the original.  Formatting of this note might be different from the original.  On RA,lost 4 lbs overnight, CXR shows b/l pleural effusions L>R- improved on last CXR, on lasix. Enc OOB, amb, C&DB, PEP      Bradycardia 05/26/2014     Priority: Medium     Overview Note:     Formatting of this note might be different from the original.  -Aflutter SVR- see plans under Aflutter      Elevated bilirubin 05/26/2014     Priority: Medium     Overview Note:     Formatting of this note might be different from the original.  Preop bili elevated, testing normal with normal liver fxn. Per CTS likely d/t TR, engorgement of liver, cannot r/o Gilbert's disease. tbili currently elevated,  trending down in light of recent transfusions may reflect inability to process bilirubin  Formatting of this note might be different from the original.  Formatting of this note might be different from the original.  Preop bili elevated, testing normal with normal liver fxn. Per CTS likely d/t TR, engorgement of liver, cannot r/o Gilbert's disease. tbili currently elevated,  trending down in light of recent transfusions may reflect inability to process bilirubin      Pleural effusion, left 05/26/2014     Priority: Medium     Overview Note:     Formatting of this note might be different from the original.  5/26/2014 Left effusion, egophony on exam.  Continue gentle diuresis.  Volume overload 05/26/2014     Priority: Medium     Overview Note:     Formatting of this note might be different from the original.  5/26/2014 Left effusion on CXR. +5L since OR. Marginal UOP. Starting Lasix BID.  Postoperative pain 05/23/2014     Priority: Medium     Overview Note:     Formatting of this note might be different from the original.  Has hx of altered mental status with codeine.     2/18/2015 Continue with fentanyl 25-50mcg Q1hour PRN moderate to severe pain. Starting tylenol 1gm ATC  2/19/2015 Continue IV hydromorphone with oral oxycodone and acetaminophen for pain control. 2/20/2015 Pain controlled with scheduled Tylenol and Oxycodone prn.  Stress hyperglycemia 05/23/2014     Priority: Medium     Overview Note:     Formatting of this note might be different from the original.  On SSI      Abnormal LFTs 05/03/2014     Priority: Medium     Overview Note:     Formatting of this note might be different from the original.  Per OSH reports with elevated bilirubin (see HPI). MRCP was planned but cancelled given valvular disease. ? Congestive hepatopathy, but given his severe weight loss over the last 6 months, ? Malignancy as a possible contributor  -RUQ pending  -may need CT to evaluate for pancreatic lesions.  -just of note; per OSH records, hepatitis panel was negative (will not resend here). AFP, ceruloplasmin, and alpha 1 antitrypsin checked at OSH also unremarkable. Formatting of this note might be different from the original.  Formatting of this note might be different from the original.  Per OSH reports with elevated bilirubin (see HPI). MRCP was planned but cancelled given valvular disease. ? Congestive hepatopathy, but given his severe weight loss over the last 6 months, ? Malignancy as a possible contributor  -RUQ pending  -may need CT to evaluate for pancreatic lesions.  -just of note; per OSH records, hepatitis panel was negative (will not resend here). AFP, ceruloplasmin, and alpha 1 antitrypsin checked at OSH also unremarkable.       Weight loss, unintentional 05/03/2014     Priority: Medium     Overview Note:     Formatting of this note might be different from the original.  Unintentional 25lb wt loss over the last 6-12 months   -some component of weight loss in setting of congestive hepatopathy and severe valvular regurgitation is possible      Chronic systolic heart failure ([facility]     Pacemaker-dependent due to native cardiac rhythm insufficient to support life     ICD (implantable cardioverter-defibrillator), biventricular, in situ      Overview Note:     [facility]       AAA (abdominal aortic aneurysm) ([facility] [address]      Overview Note:     follows up with Dr. Pilar Miller 4/17/2013, see epic notes      CAD (coronary artery disease)     Hypertension      Overview Note:     last bp 155/77, controled      S/P CABG x 2     S/P MVR (mitral valve replacement)     S/P TVR (tricuspid valve repair)     Severe mitral regurgitation 05/02/2014    Atrial fibrillation ([facility] [address] 05/02/2014    Protein-calorie malnutrition, severe ([facility] [address] 05/02/2014    Essential hypertension, benign 05/01/2014       Current Outpatient Medications   Medication Sig Dispense Refill    furosemide (LASIX) 20 MG tablet Take 1 tablet by mouth daily as needed (edema) 90 tablet 3    sacubitril-valsartan (ENTRESTO) 24-26 MG per tablet take 1 tablet by mouth twice a day 60 tablet 0    metoprolol succinate (TOPROL XL) 25 MG extended release tablet Take 1 tablet by mouth daily 90 tablet 0    acetaminophen (TYLENOL) 500 MG tablet Take 500 mg by mouth 2 times daily      warfarin (COUMADIN) 3 MG tablet Indications: take 1 tablet daily 3 mg alternating with 2 mg or as directed  0    Multiple Vitamins-Minerals (THERAPEUTIC MULTIVITAMIN-MINERALS) tablet Take 1 tablet by mouth daily      pravastatin (PRAVACHOL) 20 MG tablet Take 20 mg by mouth daily       No current facility-administered medications for this encounter. Allergies   Allergen Reactions    Codeine Other (See Comments)     Upsets patient, patient becomes very agitated     ROS:   Constitutional: Negative for fever, activity change and appetite change. HENT: Negative for epistaxis, difficulty swallowing. Eyes: Negative for blurred vision or double vision. Respiratory: Negative for cough, chest tightness, shortness of breath and wheezing.    Cardiovascular: Negative for chest pain, palpitations and leg swelling. Gastrointestinal: Negative for abdominal pain, heartburn, or blood in stool. Genitourinary: Negative for hematuria, burning or frequency. Musculoskeletal: Negative for myalgias, stiffness, or swelling. Skin: Negative for rash, pain, or lumps. Neurological: Negative for syncope, seizures, or headaches. Psychiatric/Behavioral: Negative for confusion and agitation. The patient is not nervous/anxious. PHYSICAL EXAM:  Vitals:    05/16/22 0638   BP: (!) 154/89   Pulse: 70   Resp: 18   Temp: 97.1 °F (36.2 °C)   TempSrc: Temporal   SpO2: 98%   Weight: 133 lb (60.3 kg)   Height: 5' 11\" (1.803 m)   Constitutional: Well-developed, no acute distress  Eyes: conjunctivae normal, no xanthelasma   Ears, Nose, Throat: oral mucosa moist, no cyanosis   CV: no JVD. Regular rate and rhythm. Normal S1S2 and no S3. No murmurs, rubs, or gallops. PMI is nondisplaced  Lungs: clear to auscultation bilaterally, normal respiratory effort without used of accessory muscles  Abdomen: soft, non-tender, bowel sounds present, no masses or hepatomegaly   Musculoskeletal: no digital clubbing, no edema   Skin: warm, no rashes   CRT-D site: well healed. No erosion, infection or migration    Echocardiogram 4/19/22:  Left Ventricle   Left ventricular size is grossly normal.   Abnormal (paradoxical) motion consistent with conduction abnormality. Ejection fraction is visually estimated at 50%. Indeterminate diastolic function. Right Ventricle   Normal right ventricular size. Right ventricle global systolic function is mildly reduced . Pacer wire visualized in right ventricle. Left Atrium   The left atrium is moderately dilated. Right Atrium   Mildly enlarged right atrium size. Mitral Valve   Normally functioning bioprosthetic artificial valve in mitral position. Individual leaflets nor well visualized. Tricuspid Valve   S/P repair.    Moderate tricuspid regurgitation. RVSP is 29 mmHg. Aortic Valve   The aortic valve appears moderately sclerotic. Aortic valve opens well. Pulmonic Valve   Mild pulmonic regurgitation present. Pericardial Effusion   No evidence of pericardial effusion. Aorta   Aortic root dimension within normal limits. Miscellaneous   Normal Inferior Vena Cava diameter and respiratory variation. Conclusions      Summary   Left ventricular size is grossly normal.   Abnormal (paradoxical) motion consistent with conduction abnormality. Ejection fraction is visually estimated at 50%. Normally functioning bioprosthetic artificial valve in mitral position. Tricuspid valve S/P repair. Moderate tricuspid regurgitation. RVSP is 29 mmHg. Signature      ----------------------------------------------------------------   Electronically signed by Kusum Logan MD(Interpreting physician)   on 04/19/2022 05:25 PM   ----------------------------------------------------------------    Device Interrogation: 9/2/21   Underlying rhythm: BiVP  Mode: VVI 70  Battery Voltage/Longevity:  10 months    Charge time: 12.7 seconds  Pacing: A: 0%  RV: 98%  LV: 98%  P wave: 1.9 mV  Impedance: >3000 ohms   Threshold: AF  RV R wave: paced  Impedance: 406 ohms   Threshold: 1.4 V @ 1.0 ms  LV R wave:paced Impedance:527 ohms   Threshold:2.0 V @ 1.0 ms  Episodes: none  Reprogramming included: changed mode to VVIR 70/120 due to chronic AF and increased impedance on atrial lead  Overall device function is normal    All device programmable settings were evaluated per above and in the scanned document, along with iterative adjustments (capture thresholds) to assess and select the most appropriate final programming to provide for consistent delivery of the appropriate therapy and to verify function of the device. EKG 5/9/22: AF with BiV paced at 71 bpm.    Summary:     1.  CRT-D in situ  - S/p Upgrade of left-sided dual chamber pacemaker to left-sided biventricular ICD 12/27/2016 preformed by Dr Alfredo Meeks  - Atrial lead malfunction and history of chronic AF  - REDD of CRT-D pulse generator on 5/3/22. Risks, benefits, and alternatives of CRT-D generator replacement were discussed in detail today. These risks include but are not limited to bleeding,infection, lead damage or discovery of a non-functional lead which would require lead revision and risks of blood clot, pneumothorax, hemothorax, cardiac perforation and tamponade, lead dislodgement, contrast induced nephropathy leading to short or even long term dialysis, vascular injury requiring emergent surgical repair, stroke and even death. The patient understands these risks and agrees to proceed today. 2. Chronic systolic heart failure with recovered LV function  - Unclear if ischemic or nonischemic cardiomyopathy from available documentation. Had CABG in 5/2015 at [facility], but EF was reported as mildly reduced preop and then 55-60% on TTE 4/29/14 by Dr. Reji Camarillo, EF 48% preop at [facility], then 30% postop 7/2015. Based on available documentation, it appears that a dual-chamber pacemaker was implanted on postoperative day #4 due to AV block and significant bradycardia. 3. Coronary artery disease  - Status post CABG May of 2014 at [facility].     4. Severe mitral regurgitation  - Status post mitral valve replacement May of 2014.     5. Permanent atrial fibrillation  - Status post maze and left atrial appendage exclusion at the time of heart surgery, May 2014, massively dilated left atrium, very little chance of restoring sinus rhythm ever again  - anticoagulated with warfarin     6. Abdominal aortic aneurysm    Recommendations:     1. Will proceed with CRT-D pulse generator change. 2. Follow up after the procedure. Encouraged the patient to call the office for any questions or concerns. Thank you again for allowing me to participate in their care.     Maxine Benjamin MD  Cardiac Electrophysiology  [facility]) Physicians  [facility]  7:38 AM  5/16/2022

## 2022-05-17 LAB
EKG ATRIAL RATE: 58 BPM
EKG Q-T INTERVAL: 486 MS
EKG QRS DURATION: 144 MS
EKG QTC CALCULATION (BAZETT): 524 MS
EKG R AXIS: 171 DEGREES
EKG T AXIS: 78 DEGREES
EKG VENTRICULAR RATE: 70 BPM

## 2022-05-19 ENCOUNTER — TELEPHONE (OUTPATIENT)
Dept: CARDIAC CATH/INVASIVE PROCEDURES | Age: 87
End: 2022-05-19

## 2022-05-19 NOTE — TELEPHONE ENCOUNTER
I called Mr. Sotelo to see how he's doing since his ICD Rocio Change on Monday 5/16/22. He states that he is doing fine and that his aquacel dressing remains intact. He denies any fevers, redness, drainage, bleeding, or swelling from the ICD site. I reminded him to remove the aquacel dressing on Monday, 5/23/22 and to leave the steri-strips intact. He is aware of his follow up appt at the [address] [place] [address] Drive on 6/1/22 at 10:30 am.  He offers no complaints and is thankful for the phone call.

## 2022-06-01 ENCOUNTER — NURSE ONLY (OUTPATIENT)
Dept: NON INVASIVE DIAGNOSTICS | Age: 87
End: 2022-06-01

## 2022-06-01 ENCOUNTER — TELEPHONE (OUTPATIENT)
Dept: CARDIOLOGY CLINIC | Age: 87
End: 2022-06-01

## 2022-06-01 DIAGNOSIS — I10 ESSENTIAL HYPERTENSION, BENIGN: Chronic | ICD-10-CM

## 2022-06-01 DIAGNOSIS — I42.9 CARDIOMYOPATHY, UNSPECIFIED TYPE (HCC): ICD-10-CM

## 2022-06-01 DIAGNOSIS — I25.810 CORONARY ARTERY DISEASE INVOLVING CORONARY BYPASS GRAFT OF NATIVE HEART WITHOUT ANGINA PECTORIS: ICD-10-CM

## 2022-06-01 NOTE — TELEPHONE ENCOUNTER
Wife stopped in, pt needs refill on Entresto, send to [facility] on [address] for 30 day supply.

## 2022-06-01 NOTE — PATIENT INSTRUCTIONS
You may shower starting today    Call if any signs or symptoms of infection [phone] ext: [phone]  Fevers, chills, redness, swelling or drainage. [employer] support (home monitoring) [phone]    If you receive a shock    1 shock and you feel fine send a home transmission and call the office    [phone]   1 shock and you feel poorly after the shock go to the emergency room and do   not drive. Multiple shocks report to the emergency room and do no drive.

## 2022-06-02 RX ORDER — SACUBITRIL AND VALSARTAN 24; 26 MG/1; MG/1
TABLET, FILM COATED ORAL
Qty: 60 TABLET | Refills: 5 | Status: SHIPPED | OUTPATIENT
Start: 2022-06-02

## 2022-06-27 RX ORDER — METOPROLOL SUCCINATE 25 MG/1
25 TABLET, EXTENDED RELEASE ORAL DAILY
Qty: 90 TABLET | Refills: 3 | Status: SHIPPED | OUTPATIENT
Start: 2022-06-27

## 2022-11-30 DIAGNOSIS — I25.810 CORONARY ARTERY DISEASE INVOLVING CORONARY BYPASS GRAFT OF NATIVE HEART WITHOUT ANGINA PECTORIS: ICD-10-CM

## 2022-11-30 DIAGNOSIS — I10 ESSENTIAL HYPERTENSION, BENIGN: Chronic | ICD-10-CM

## 2022-11-30 DIAGNOSIS — I42.9 CARDIOMYOPATHY, UNSPECIFIED TYPE (HCC): ICD-10-CM

## 2022-11-30 RX ORDER — SACUBITRIL AND VALSARTAN 24; 26 MG/1; MG/1
TABLET, FILM COATED ORAL
Qty: 60 TABLET | Refills: 5 | Status: SHIPPED | OUTPATIENT
Start: 2022-11-30

## 2023-02-27 NOTE — PROGRESS NOTES
[facility]) Physicians- [facility] and [address] [facility] Electrophysiology  Outpatient Progress Note  Jaime Lee  10/6/1934  Date of Service: 3/2/2023  PCP: Jose De Jesus Morillo MD  Electrophysiologist: Jessica Driscoll MD        Subjective: Jaime Lee is seen for follow-up and management of CRT-D in situ. Jaime Lee previously followed with Dr. Renny Colon. He underwent generator change on 5/16/2022. He reports feeling overallwell and offers no complaints from a device POV. The device site looks well healed and free from infection or erosion. The patient denies any chest pain, dyspnea, palpitations, dizziness, syncope, orthopnea or paroxysmal nocturnal dyspnea. The patient continues to be followed remotely.     Patient Active Problem List   Diagnosis    Essential hypertension, benign    Severe mitral regurgitation    Atrial fibrillation (HCC)    Protein-calorie malnutrition, severe (HCC)    AAA (abdominal aortic aneurysm)    CAD (coronary artery disease)    Hypertension    S/P CABG x 2    S/P MVR (mitral valve replacement)    S/P TVR (tricuspid valve repair)    Chronic systolic heart failure ([address]    Pacemaker-dependent due to native cardiac rhythm insufficient to support life    ICD (implantable cardioverter-defibrillator), biventricular, in situ    Abnormal LFTs    Atelectasis    Bradycardia    Elevated bilirubin    Hyperlipidemia with target low density lipoprotein (LDL) cholesterol less than 70 mg/dL    Leukocytosis    Pleural effusion, left    Postoperative pain    Stress hyperglycemia    Volume overload    Weight loss, unintentional    End of battery life of cardiac resynchronization therapy defibrillator (CRT-D)       Current Outpatient Medications   Medication Sig Dispense Refill    sacubitril-valsartan (ENTRESTO) 24-26 MG per tablet take 1 tablet by mouth twice a day 60 tablet 5    metoprolol succinate (TOPROL XL) 25 MG extended release tablet Take 1 tablet by mouth daily 90 tablet 3    furosemide (LASIX) 20 MG tablet Take 1 tablet by mouth daily as needed (edema) 90 tablet 3    acetaminophen (TYLENOL) 500 MG tablet Take 500 mg by mouth 2 times daily      warfarin (COUMADIN) 3 MG tablet Take 3 mg by mouth daily or as directed  0    Multiple Vitamins-Minerals (THERAPEUTIC MULTIVITAMIN-MINERALS) tablet Take 1 tablet by mouth daily      pravastatin (PRAVACHOL) 20 MG tablet Take 20 mg by mouth daily       No current facility-administered medications for this visit. Allergies   Allergen Reactions    Codeine Other (See Comments)     Upsets patient, patient becomes very agitated     ROS:   Constitutional: Negative for fever, activity change and appetite change. HENT: Negative for epistaxis. Eyes: Negative for diploplia, blurred vision. Respiratory: Negative for cough, chest tightness, shortness of breath and wheezing. Cardiovascular: pertinent positives in HPI  Gastrointestinal: Negative for abdominal pain and blood in stool. All other review of systems are negative     PHYSICAL EXAM:      Vitals:    03/02/23 1344   BP: 110/76   Pulse: 71   Resp: 18   Weight: 132 lb 12.8 oz (60.2 kg)   Height: 5' 8\" (1.727 m)     Constitutional: well-developed, no acute distress  Eyes: conjunctivae normal, no xanthelasma   Ears, Nose, Throat: oral mucosa moist, no cyanosis   CV: no JVD. Regular rate and rhythm. Normal S1S2 and no S3. No murmurs, rubs, or gallops. PMI is nondisplaced  Lungs: clear to auscultation bilaterally, normal respiratory effort without used of accessory muscles  Abdomen: soft, non-tender, bowel sounds present, no masses or hepatomegaly   Musculoskeletal: no digital clubbing, no edema   Skin: warm, no rashes   CRT-D site: well healed. No erosion, infection or migration    Data:    No results for input(s): WBC, HGB, HCT, PLT in the last 72 hours. No results for input(s): NA, K, CL, CO2, BUN, CREATININE, GLU, CALCIUM in the last 72 hours.     Invalid input(s): MAGNESIUM  Lab Results   Component Value Date/Time    MG 2.2 05/16/2022 06:46 AM     No results for input(s): TSH in the last 72 hours. No results for input(s): INR in the last 72 hours. EKG  03/02/23 : AF with V paced, rate: 71bpm - Please see scan in Cardiology. Echocardiogram 4/19/22:  Left Ventricle   Left ventricular size is grossly normal.   Abnormal (paradoxical) motion consistent with conduction abnormality. Ejection fraction is visually estimated at 50%. Indeterminate diastolic function. Right Ventricle   Normal right ventricular size. Right ventricle global systolic function is mildly reduced . Pacer wire visualized in right ventricle. Left Atrium   The left atrium is moderately dilated. Right Atrium   Mildly enlarged right atrium size. Mitral Valve   Normally functioning bioprosthetic artificial valve in mitral position. Individual leaflets nor well visualized. Tricuspid Valve   S/P repair. Moderate tricuspid regurgitation. RVSP is 29 mmHg. Aortic Valve   The aortic valve appears moderately sclerotic. Aortic valve opens well. Pulmonic Valve   Mild pulmonic regurgitation present. Pericardial Effusion   No evidence of pericardial effusion. Aorta   Aortic root dimension within normal limits. Miscellaneous   Normal Inferior Vena Cava diameter and respiratory variation. Conclusions      Summary   Left ventricular size is grossly normal.   Abnormal (paradoxical) motion consistent with conduction abnormality. Ejection fraction is visually estimated at 50%. Normally functioning bioprosthetic artificial valve in mitral position. Tricuspid valve S/P repair. Moderate tricuspid regurgitation. RVSP is 29 mmHg.       Signature      ----------------------------------------------------------------   Electronically signed by Olivia Blanca MD(Interpreting physician)   on 04/19/2022 05:25 PM      Device Interrogation: 3/2/23   Underlying rhythm: CHB  Mode: VVIR   Battery Voltage/Longevity:  4.5 years    Charge time: 10.1 seconds  Pacing:  RV: 98%  LV: 98%  RV R wave: paced Impedance: 386 ohms   Threshold: 2.0 V @ 0.4 ms  LV R wave:paced  Impedance:484 ohms   Threshold:2.5 V @ 1.0 ms  Episodes: none  Reprogramming included: see below  Overall device function is normal    All device programmable settings were evaluated per above and in the scanned document, along with iterative adjustments (capture thresholds) to assess and select the most appropriate final programming to provide for consistent delivery of the appropriate therapy and to verify function of the device. I have independently reviewed all of the ECGs and rhythm strips per above     Assessment and plan:    1. CRT-D in situ  - Dual chamber pacemaker implantation in May 2014. - Based on available documentation, it appears that a dual-chamber pacemaker was implanted on postoperative day #4 due to AV block and significant bradycardia. - Upgrade of left-sided dual chamber pacemaker to left-sided biventricular ICD 12/27/2016 performed by Dr Gian Black  - Atrial lead malfunction and history of chronic AF  - Pulse generator change on 5/16/2022.  - Normal device function. 2. Chronic systolic heart failure with recovered LV function  - Unclear if ischemic or nonischemic cardiomyopathy from available documentation.   - Had CABG in 5/2015 at [facility], but EF was reported as mildly reduced preop and then 55-60% on TTE 4/29/14 by Dr. David Yanez, EF 48% preop at [facility], then 30% postop 7/2015.   - LV EF 50% on TTE 4/19/22. - On GDMT including Toprol XL, Entresto and Lasix.  - NYHA class II-III, ACC/AHA stage C  - Euvolemic and compensated. 3. Coronary artery disease  - Status post CABG in May 2014 at [facility]. 4. Severe mitral regurgitation  - Status post mitral valve replacement in May 2014. - Normally functioning bioprosthetic artificial valve in mitral position on 4/19/22    5.  Permanent atrial fibrillation  - Status post maze and left atrial appendage exclusion at the time of heart surgery, May 2014, massively dilated left atrium, very little chance of restoring sinus rhythm ever again  - On Toprol XL for rate control.  - On Warfarin for stroke risk reduction.  - Presents in AF with CVR. 6. Abdominal aortic aneurysm    7. Hyperlipidemia  - On Pravachol. Recommendations:    1. Normal CRT-D function. 2. Continue Toprol XL 25 mg daily. 3. Continue Warfarin to target INR 2 to 3.  4. Remote device interrogations every 91 days. 5. Follow up in 1 year or sooner PRN. Encouraged the patient to call the office for any questions or concerns. I have spent a total of 40 minutes with the patient and the family reviewing the above stated recommendations. And a total of >50% of that time involved face-to-face time providing counseling and/or coordination of care with the other providers, preparation for the clinic visit, reviewing records/tests, counseling/education of the patient, ordering medications/tests/procedures, coordinating care, and documenting clinical information in the EHR. Thank you for allowing me to participate in your patient's care. Please call me if there are any questions or concerns.       Viridiana Dumont MD  Cardiac Electrophysiology  [facility]) Physicians  The Heart and [facility] [place]: [place] Electrophysiology  3/2/23   1:56 PM

## 2023-03-02 ENCOUNTER — OFFICE VISIT (OUTPATIENT)
Dept: NON INVASIVE DIAGNOSTICS | Age: 88
End: 2023-03-02
Payer: MEDICARE

## 2023-03-02 VITALS
HEART RATE: 71 BPM | BODY MASS INDEX: 20.13 KG/M2 | SYSTOLIC BLOOD PRESSURE: 110 MMHG | WEIGHT: 132.8 LBS | RESPIRATION RATE: 18 BRPM | DIASTOLIC BLOOD PRESSURE: 76 MMHG | HEIGHT: 68 IN

## 2023-03-02 DIAGNOSIS — R00.1 BRADYCARDIA: ICD-10-CM

## 2023-03-02 DIAGNOSIS — Z95.810 ICD (IMPLANTABLE CARDIOVERTER-DEFIBRILLATOR), BIVENTRICULAR, IN SITU: Primary | ICD-10-CM

## 2023-03-02 DIAGNOSIS — I48.20 CHRONIC ATRIAL FIBRILLATION (HCC): ICD-10-CM

## 2023-03-02 PROCEDURE — 1123F ACP DISCUSS/DSCN MKR DOCD: CPT | Performed by: INTERNAL MEDICINE

## 2023-03-02 PROCEDURE — 1036F TOBACCO NON-USER: CPT | Performed by: INTERNAL MEDICINE

## 2023-03-02 PROCEDURE — 99215 OFFICE O/P EST HI 40 MIN: CPT | Performed by: INTERNAL MEDICINE

## 2023-03-02 PROCEDURE — G8420 CALC BMI NORM PARAMETERS: HCPCS | Performed by: INTERNAL MEDICINE

## 2023-03-02 PROCEDURE — G8484 FLU IMMUNIZE NO ADMIN: HCPCS | Performed by: INTERNAL MEDICINE

## 2023-03-02 PROCEDURE — G8427 DOCREV CUR MEDS BY ELIG CLIN: HCPCS | Performed by: INTERNAL MEDICINE

## 2023-05-09 ENCOUNTER — APPOINTMENT (OUTPATIENT)
Dept: CT IMAGING | Age: 88
End: 2023-05-09
Payer: MEDICARE

## 2023-05-09 ENCOUNTER — HOSPITAL ENCOUNTER (EMERGENCY)
Age: 88
Discharge: HOME | End: 2023-05-09
Attending: EMERGENCY MEDICINE
Payer: MEDICARE

## 2023-05-09 ENCOUNTER — APPOINTMENT (OUTPATIENT)
Dept: GENERAL RADIOLOGY | Age: 88
End: 2023-05-09
Payer: MEDICARE

## 2023-05-09 VITALS
SYSTOLIC BLOOD PRESSURE: 147 MMHG | BODY MASS INDEX: 20.04 KG/M2 | TEMPERATURE: 97.5 F | WEIGHT: 140 LBS | HEIGHT: 70 IN | DIASTOLIC BLOOD PRESSURE: 97 MMHG | HEART RATE: 86 BPM | RESPIRATION RATE: 16 BRPM | OXYGEN SATURATION: 98 %

## 2023-05-09 DIAGNOSIS — S80.211A ABRASION OF RIGHT KNEE, INITIAL ENCOUNTER: ICD-10-CM

## 2023-05-09 DIAGNOSIS — S40.811A ABRASION OF RIGHT UPPER EXTREMITY, INITIAL ENCOUNTER: ICD-10-CM

## 2023-05-09 DIAGNOSIS — S01.01XA LACERATION OF SCALP, INITIAL ENCOUNTER: Primary | ICD-10-CM

## 2023-05-09 LAB
INR BLD: 2
PROTHROMBIN TIME: 22.1 SEC (ref 9.3–12.4)

## 2023-05-09 PROCEDURE — 73562 X-RAY EXAM OF KNEE 3: CPT

## 2023-05-09 PROCEDURE — 36415 COLL VENOUS BLD VENIPUNCTURE: CPT

## 2023-05-09 PROCEDURE — 12002 RPR S/N/AX/GEN/TRNK2.6-7.5CM: CPT

## 2023-05-09 PROCEDURE — 85610 PROTHROMBIN TIME: CPT

## 2023-05-09 PROCEDURE — 90714 TD VACC NO PRESV 7 YRS+ IM: CPT | Performed by: EMERGENCY MEDICINE

## 2023-05-09 PROCEDURE — 90471 IMMUNIZATION ADMIN: CPT | Performed by: EMERGENCY MEDICINE

## 2023-05-09 PROCEDURE — 6360000002 HC RX W HCPCS: Performed by: EMERGENCY MEDICINE

## 2023-05-09 PROCEDURE — 70450 CT HEAD/BRAIN W/O DYE: CPT

## 2023-05-09 PROCEDURE — 99284 EMERGENCY DEPT VISIT MOD MDM: CPT

## 2023-05-09 RX ORDER — TETANUS AND DIPHTHERIA TOXOIDS ADSORBED 2; 2 [LF]/.5ML; [LF]/.5ML
0.5 INJECTION INTRAMUSCULAR ONCE
Status: COMPLETED | OUTPATIENT
Start: 2023-05-09 | End: 2023-05-09

## 2023-05-09 RX ADMIN — TETANUS AND DIPHTHERIA TOXOIDS ADSORBED 0.5 ML: 2; 2 INJECTION INTRAMUSCULAR at 13:39

## 2023-05-09 ASSESSMENT — PAIN - FUNCTIONAL ASSESSMENT: PAIN_FUNCTIONAL_ASSESSMENT: 0-10

## 2023-05-09 ASSESSMENT — ENCOUNTER SYMPTOMS
SINUS PRESSURE: 0
WHEEZING: 0
NAUSEA: 0
SORE THROAT: 0
ABDOMINAL PAIN: 0
VOMITING: 0
BACK PAIN: 0
EYE REDNESS: 0
SHORTNESS OF BREATH: 0
COUGH: 0
EYE PAIN: 0
DIARRHEA: 0
EYE DISCHARGE: 0

## 2023-05-09 ASSESSMENT — LIFESTYLE VARIABLES
HOW MANY STANDARD DRINKS CONTAINING ALCOHOL DO YOU HAVE ON A TYPICAL DAY: PATIENT DOES NOT DRINK
HOW OFTEN DO YOU HAVE A DRINK CONTAINING ALCOHOL: NEVER

## 2023-05-09 ASSESSMENT — PAIN DESCRIPTION - ORIENTATION: ORIENTATION: RIGHT

## 2023-05-09 ASSESSMENT — PAIN DESCRIPTION - LOCATION: LOCATION: HEAD

## 2023-05-09 ASSESSMENT — PAIN DESCRIPTION - DESCRIPTORS: DESCRIPTORS: DISCOMFORT

## 2023-05-09 ASSESSMENT — PAIN SCALES - GENERAL: PAINLEVEL_OUTOF10: 1

## 2023-05-09 NOTE — ED PROVIDER NOTES
43-year-old male presents to the emergency department after mechanical fall in his home. He stood up and got up to walk to the bathroom when he tripped and fell hitting the right side of his head, right arm and right knee. Failure reports a laceration to the head as well as an abrasion to the arm and knee. Patient is on Coumadin due to pacemaker placement in the past.  He is compliant with that medication taking 2 mg on proper regimen. Patient reports a loss of consciousness he was ambulatory after the fall he states no other complaints at this time. The history is provided by the patient. Fall  The accident occurred Less than 1 hour ago. He fell from a height of 1 to 2 ft. The point of impact was the head, right knee and right wrist. The pain is present in the head. The pain is at a severity of 2/10. The pain is mild. He was Not ambulatory at the scene. There was No drug use involved in the accident. There was No alcohol use involved in the accident. Pertinent negatives include no fever, no abdominal pain, no nausea, no vomiting and no headaches. He has tried nothing for the symptoms. The treatment provided no relief. Review of Systems   Constitutional:  Negative for chills and fever. HENT:  Negative for ear pain, sinus pressure and sore throat. Eyes:  Negative for pain, discharge and redness. Respiratory:  Negative for cough, shortness of breath and wheezing. Cardiovascular:  Negative for chest pain. Gastrointestinal:  Negative for abdominal pain, diarrhea, nausea and vomiting. Genitourinary:  Negative for dysuria and frequency. Musculoskeletal:  Negative for arthralgias and back pain. Skin:  Positive for wound. Negative for rash. Neurological:  Negative for weakness and headaches. Hematological:  Negative for adenopathy. All other systems reviewed and are negative. Physical Exam  Constitutional:       Appearance: Normal appearance. HENT:      Head: Normocephalic.

## 2023-05-30 DIAGNOSIS — I10 ESSENTIAL HYPERTENSION, BENIGN: Chronic | ICD-10-CM

## 2023-05-30 DIAGNOSIS — I25.810 CORONARY ARTERY DISEASE INVOLVING CORONARY BYPASS GRAFT OF NATIVE HEART WITHOUT ANGINA PECTORIS: ICD-10-CM

## 2023-05-30 DIAGNOSIS — I42.9 CARDIOMYOPATHY, UNSPECIFIED TYPE (HCC): ICD-10-CM

## 2023-05-30 RX ORDER — SACUBITRIL AND VALSARTAN 24; 26 MG/1; MG/1
TABLET, FILM COATED ORAL
Qty: 60 TABLET | Refills: 5 | Status: SHIPPED | OUTPATIENT
Start: 2023-05-30

## 2023-06-23 RX ORDER — METOPROLOL SUCCINATE 25 MG/1
25 TABLET, EXTENDED RELEASE ORAL DAILY
Qty: 90 TABLET | Refills: 0 | Status: SHIPPED | OUTPATIENT
Start: 2023-06-23

## 2023-07-25 RX ORDER — FUROSEMIDE 20 MG/1
TABLET ORAL
Qty: 90 TABLET | Refills: 0 | Status: SHIPPED | OUTPATIENT
Start: 2023-07-25

## 2023-08-19 PROCEDURE — 93296 REM INTERROG EVL PM/IDS: CPT | Performed by: INTERNAL MEDICINE

## 2023-08-19 PROCEDURE — 93295 DEV INTERROG REMOTE 1/2/MLT: CPT | Performed by: INTERNAL MEDICINE

## 2023-08-25 PROCEDURE — G2066 INTER DEVC REMOTE 30D: HCPCS | Performed by: INTERNAL MEDICINE

## 2023-08-25 PROCEDURE — 93297 REM INTERROG DEV EVAL ICPMS: CPT | Performed by: INTERNAL MEDICINE

## 2023-08-28 DIAGNOSIS — I42.9 CARDIOMYOPATHY, UNSPECIFIED TYPE (HCC): ICD-10-CM

## 2023-08-28 DIAGNOSIS — I10 ESSENTIAL HYPERTENSION, BENIGN: Chronic | ICD-10-CM

## 2023-08-28 DIAGNOSIS — I25.810 CORONARY ARTERY DISEASE INVOLVING CORONARY BYPASS GRAFT OF NATIVE HEART WITHOUT ANGINA PECTORIS: ICD-10-CM

## 2023-08-29 RX ORDER — SACUBITRIL AND VALSARTAN 24; 26 MG/1; MG/1
TABLET, FILM COATED ORAL
Qty: 60 TABLET | Refills: 5 | Status: SHIPPED | OUTPATIENT
Start: 2023-08-29

## 2023-09-14 RX ORDER — METOPROLOL SUCCINATE 25 MG/1
25 TABLET, EXTENDED RELEASE ORAL DAILY
Qty: 90 TABLET | Refills: 0 | Status: SHIPPED | OUTPATIENT
Start: 2023-09-14

## 2023-12-10 PROBLEM — K52.9 COLITIS: Status: ACTIVE | Noted: 2023-12-10

## 2023-12-10 PROBLEM — N17.9 AKI (ACUTE KIDNEY INJURY) (HCC): Status: ACTIVE | Noted: 2023-12-10

## 2023-12-11 PROBLEM — A04.72 CLOSTRIDIUM DIFFICILE COLITIS: Status: ACTIVE | Noted: 2023-12-11

## 2023-12-11 PROBLEM — R79.1 SUPRATHERAPEUTIC INR: Status: ACTIVE | Noted: 2023-12-11

## 2023-12-12 PROBLEM — E43 SEVERE PROTEIN-CALORIE MALNUTRITION (HCC): Status: ACTIVE | Noted: 2023-12-12

## 2024-01-19 ENCOUNTER — APPOINTMENT (OUTPATIENT)
Dept: GENERAL RADIOLOGY | Age: 89
DRG: 291 | End: 2024-01-19
Payer: MEDICARE

## 2024-01-19 ENCOUNTER — HOSPITAL ENCOUNTER (INPATIENT)
Age: 89
LOS: 10 days | Discharge: NURSING, REHAB OR LONG-TERM CARE | DRG: 291 | End: 2024-01-29
Attending: EMERGENCY MEDICINE | Admitting: INTERNAL MEDICINE
Payer: MEDICARE

## 2024-01-19 DIAGNOSIS — R79.1 SUPRATHERAPEUTIC INR: ICD-10-CM

## 2024-01-19 DIAGNOSIS — I50.9 ACUTE ON CHRONIC CONGESTIVE HEART FAILURE, UNSPECIFIED HEART FAILURE TYPE (HCC): Primary | ICD-10-CM

## 2024-01-19 DIAGNOSIS — D64.9 ANEMIA, UNSPECIFIED TYPE: ICD-10-CM

## 2024-01-19 DIAGNOSIS — J90 BILATERAL PLEURAL EFFUSION: ICD-10-CM

## 2024-01-19 LAB
ALBUMIN SERPL-MCNC: 3.6 G/DL (ref 3.5–5.2)
ALP SERPL-CCNC: 76 U/L (ref 40–129)
ALT SERPL-CCNC: 17 U/L (ref 0–40)
ANION GAP SERPL CALCULATED.3IONS-SCNC: 9 MMOL/L (ref 7–16)
AST SERPL-CCNC: 20 U/L (ref 0–39)
BASOPHILS # BLD: 0.06 K/UL (ref 0–0.2)
BASOPHILS NFR BLD: 1 % (ref 0–2)
BILIRUB SERPL-MCNC: 1.5 MG/DL (ref 0–1.2)
BNP SERPL-MCNC: 6565 PG/ML (ref 0–450)
BUN SERPL-MCNC: 17 MG/DL (ref 6–23)
CALCIUM SERPL-MCNC: 8.9 MG/DL (ref 8.6–10.2)
CHLORIDE SERPL-SCNC: 105 MMOL/L (ref 98–107)
CO2 SERPL-SCNC: 25 MMOL/L (ref 22–29)
CREAT SERPL-MCNC: 0.8 MG/DL (ref 0.7–1.2)
EOSINOPHIL # BLD: 0.21 K/UL (ref 0.05–0.5)
EOSINOPHILS RELATIVE PERCENT: 2 % (ref 0–6)
ERYTHROCYTE [DISTWIDTH] IN BLOOD BY AUTOMATED COUNT: 18.1 % (ref 11.5–15)
GFR SERPL CREATININE-BSD FRML MDRD: >60 ML/MIN/1.73M2
GLUCOSE SERPL-MCNC: 89 MG/DL (ref 74–99)
HCT VFR BLD AUTO: 29.3 % (ref 37–54)
HGB BLD-MCNC: 9.5 G/DL (ref 12.5–16.5)
IMM GRANULOCYTES # BLD AUTO: <0.03 K/UL (ref 0–0.58)
IMM GRANULOCYTES NFR BLD: 0 % (ref 0–5)
INR PPP: 1.4
LYMPHOCYTES NFR BLD: 1.09 K/UL (ref 1.5–4)
LYMPHOCYTES RELATIVE PERCENT: 12 % (ref 20–42)
MAGNESIUM SERPL-MCNC: 1.9 MG/DL (ref 1.6–2.6)
MCH RBC QN AUTO: 33.1 PG (ref 26–35)
MCHC RBC AUTO-ENTMCNC: 32.4 G/DL (ref 32–34.5)
MCV RBC AUTO: 102.1 FL (ref 80–99.9)
MONOCYTES NFR BLD: 0.63 K/UL (ref 0.1–0.95)
MONOCYTES NFR BLD: 7 % (ref 2–12)
NEUTROPHILS NFR BLD: 77 % (ref 43–80)
NEUTS SEG NFR BLD: 6.89 K/UL (ref 1.8–7.3)
PLATELET # BLD AUTO: 225 K/UL (ref 130–450)
PMV BLD AUTO: 11.1 FL (ref 7–12)
POTASSIUM SERPL-SCNC: 4.8 MMOL/L (ref 3.5–5)
PROT SERPL-MCNC: 7 G/DL (ref 6.4–8.3)
PROTHROMBIN TIME: 15.9 SEC (ref 9.3–12.4)
RBC # BLD AUTO: 2.87 M/UL (ref 3.8–5.8)
SODIUM SERPL-SCNC: 139 MMOL/L (ref 132–146)
TROPONIN I SERPL HS-MCNC: 41 NG/L (ref 0–11)
TROPONIN I SERPL HS-MCNC: 47 NG/L (ref 0–11)
WBC OTHER # BLD: 8.9 K/UL (ref 4.5–11.5)

## 2024-01-19 PROCEDURE — 6360000002 HC RX W HCPCS: Performed by: NURSE PRACTITIONER

## 2024-01-19 PROCEDURE — 83880 ASSAY OF NATRIURETIC PEPTIDE: CPT

## 2024-01-19 PROCEDURE — 80053 COMPREHEN METABOLIC PANEL: CPT

## 2024-01-19 PROCEDURE — 2060000000 HC ICU INTERMEDIATE R&B

## 2024-01-19 PROCEDURE — 2580000003 HC RX 258: Performed by: NURSE PRACTITIONER

## 2024-01-19 PROCEDURE — 85025 COMPLETE CBC W/AUTO DIFF WBC: CPT

## 2024-01-19 PROCEDURE — 6370000000 HC RX 637 (ALT 250 FOR IP): Performed by: NURSE PRACTITIONER

## 2024-01-19 PROCEDURE — 93005 ELECTROCARDIOGRAM TRACING: CPT

## 2024-01-19 PROCEDURE — 85610 PROTHROMBIN TIME: CPT

## 2024-01-19 PROCEDURE — 83735 ASSAY OF MAGNESIUM: CPT

## 2024-01-19 PROCEDURE — 99285 EMERGENCY DEPT VISIT HI MDM: CPT

## 2024-01-19 PROCEDURE — 84484 ASSAY OF TROPONIN QUANT: CPT

## 2024-01-19 PROCEDURE — 6360000002 HC RX W HCPCS

## 2024-01-19 PROCEDURE — 71046 X-RAY EXAM CHEST 2 VIEWS: CPT

## 2024-01-19 RX ORDER — PROCHLORPERAZINE EDISYLATE 5 MG/ML
10 INJECTION INTRAMUSCULAR; INTRAVENOUS EVERY 6 HOURS PRN
Status: DISCONTINUED | OUTPATIENT
Start: 2024-01-19 | End: 2024-01-29 | Stop reason: HOSPADM

## 2024-01-19 RX ORDER — SODIUM CHLORIDE 0.9 % (FLUSH) 0.9 %
10 SYRINGE (ML) INJECTION PRN
Status: DISCONTINUED | OUTPATIENT
Start: 2024-01-19 | End: 2024-01-29 | Stop reason: HOSPADM

## 2024-01-19 RX ORDER — SODIUM CHLORIDE 9 MG/ML
INJECTION, SOLUTION INTRAVENOUS PRN
Status: DISCONTINUED | OUTPATIENT
Start: 2024-01-19 | End: 2024-01-29 | Stop reason: HOSPADM

## 2024-01-19 RX ORDER — ACETAMINOPHEN 650 MG/1
650 SUPPOSITORY RECTAL EVERY 6 HOURS PRN
Status: DISCONTINUED | OUTPATIENT
Start: 2024-01-19 | End: 2024-01-29 | Stop reason: HOSPADM

## 2024-01-19 RX ORDER — MIDODRINE HYDROCHLORIDE 5 MG/1
5 TABLET ORAL
Status: DISCONTINUED | OUTPATIENT
Start: 2024-01-19 | End: 2024-01-29 | Stop reason: HOSPADM

## 2024-01-19 RX ORDER — ONDANSETRON 2 MG/ML
4 INJECTION INTRAMUSCULAR; INTRAVENOUS EVERY 6 HOURS PRN
Status: DISCONTINUED | OUTPATIENT
Start: 2024-01-19 | End: 2024-01-19 | Stop reason: CLARIF

## 2024-01-19 RX ORDER — ONDANSETRON 4 MG/1
4 TABLET, ORALLY DISINTEGRATING ORAL EVERY 8 HOURS PRN
Status: DISCONTINUED | OUTPATIENT
Start: 2024-01-19 | End: 2024-01-19 | Stop reason: CLARIF

## 2024-01-19 RX ORDER — LACTOBACILLUS RHAMNOSUS GG 10B CELL
1 CAPSULE ORAL DAILY
Status: DISCONTINUED | OUTPATIENT
Start: 2024-01-19 | End: 2024-01-29 | Stop reason: HOSPADM

## 2024-01-19 RX ORDER — DICYCLOMINE HYDROCHLORIDE 10 MG/1
10 CAPSULE ORAL 2 TIMES DAILY
Status: DISCONTINUED | OUTPATIENT
Start: 2024-01-19 | End: 2024-01-29 | Stop reason: HOSPADM

## 2024-01-19 RX ORDER — MAGNESIUM SULFATE IN WATER 40 MG/ML
2000 INJECTION, SOLUTION INTRAVENOUS PRN
Status: DISCONTINUED | OUTPATIENT
Start: 2024-01-19 | End: 2024-01-29 | Stop reason: HOSPADM

## 2024-01-19 RX ORDER — FUROSEMIDE 10 MG/ML
20 INJECTION INTRAMUSCULAR; INTRAVENOUS ONCE
Status: COMPLETED | OUTPATIENT
Start: 2024-01-19 | End: 2024-01-19

## 2024-01-19 RX ORDER — ACETAMINOPHEN 325 MG/1
650 TABLET ORAL EVERY 6 HOURS PRN
Status: DISCONTINUED | OUTPATIENT
Start: 2024-01-19 | End: 2024-01-29 | Stop reason: HOSPADM

## 2024-01-19 RX ORDER — SODIUM CHLORIDE 0.9 % (FLUSH) 0.9 %
5-40 SYRINGE (ML) INJECTION EVERY 12 HOURS SCHEDULED
Status: DISCONTINUED | OUTPATIENT
Start: 2024-01-19 | End: 2024-01-29 | Stop reason: HOSPADM

## 2024-01-19 RX ORDER — CHOLESTYRAMINE 4 G/9G
1 POWDER, FOR SUSPENSION ORAL 2 TIMES DAILY
Status: DISCONTINUED | OUTPATIENT
Start: 2024-01-19 | End: 2024-01-29 | Stop reason: HOSPADM

## 2024-01-19 RX ORDER — ENOXAPARIN SODIUM 100 MG/ML
40 INJECTION SUBCUTANEOUS DAILY
Status: DISCONTINUED | OUTPATIENT
Start: 2024-01-19 | End: 2024-01-21

## 2024-01-19 RX ORDER — BUMETANIDE 0.25 MG/ML
1 INJECTION INTRAMUSCULAR; INTRAVENOUS 2 TIMES DAILY
Status: DISCONTINUED | OUTPATIENT
Start: 2024-01-19 | End: 2024-01-22

## 2024-01-19 RX ORDER — CYPROHEPTADINE HYDROCHLORIDE 4 MG/1
4 TABLET ORAL DAILY
Status: DISCONTINUED | OUTPATIENT
Start: 2024-01-19 | End: 2024-01-29 | Stop reason: HOSPADM

## 2024-01-19 RX ORDER — PROCHLORPERAZINE MALEATE 10 MG
10 TABLET ORAL EVERY 8 HOURS PRN
Status: DISCONTINUED | OUTPATIENT
Start: 2024-01-19 | End: 2024-01-29 | Stop reason: HOSPADM

## 2024-01-19 RX ORDER — POTASSIUM CHLORIDE 20 MEQ/1
40 TABLET, EXTENDED RELEASE ORAL PRN
Status: DISCONTINUED | OUTPATIENT
Start: 2024-01-19 | End: 2024-01-29 | Stop reason: HOSPADM

## 2024-01-19 RX ORDER — POTASSIUM CHLORIDE 7.45 MG/ML
10 INJECTION INTRAVENOUS PRN
Status: DISCONTINUED | OUTPATIENT
Start: 2024-01-19 | End: 2024-01-29 | Stop reason: HOSPADM

## 2024-01-19 RX ORDER — METOPROLOL SUCCINATE 25 MG/1
25 TABLET, EXTENDED RELEASE ORAL DAILY
Status: DISCONTINUED | OUTPATIENT
Start: 2024-01-19 | End: 2024-01-28

## 2024-01-19 RX ORDER — PRAVASTATIN SODIUM 20 MG
20 TABLET ORAL DAILY
Status: DISCONTINUED | OUTPATIENT
Start: 2024-01-19 | End: 2024-01-29 | Stop reason: HOSPADM

## 2024-01-19 RX ADMIN — ENOXAPARIN SODIUM 40 MG: 100 INJECTION SUBCUTANEOUS at 18:46

## 2024-01-19 RX ADMIN — METOPROLOL SUCCINATE 25 MG: 25 TABLET, EXTENDED RELEASE ORAL at 18:47

## 2024-01-19 RX ADMIN — BUMETANIDE 1 MG: 0.25 INJECTION INTRAMUSCULAR; INTRAVENOUS at 19:25

## 2024-01-19 RX ADMIN — SODIUM CHLORIDE, PRESERVATIVE FREE 10 ML: 5 INJECTION INTRAVENOUS at 19:28

## 2024-01-19 RX ADMIN — CHOLESTYRAMINE 4 G: 4 POWDER, FOR SUSPENSION ORAL at 19:25

## 2024-01-19 RX ADMIN — DICYCLOMINE HYDROCHLORIDE 10 MG: 10 CAPSULE ORAL at 19:24

## 2024-01-19 RX ADMIN — FUROSEMIDE 20 MG: 10 INJECTION, SOLUTION INTRAMUSCULAR; INTRAVENOUS at 18:47

## 2024-01-19 RX ADMIN — SACUBITRIL AND VALSARTAN 1 TABLET: 24; 26 TABLET, FILM COATED ORAL at 19:25

## 2024-01-19 ASSESSMENT — PAIN - FUNCTIONAL ASSESSMENT: PAIN_FUNCTIONAL_ASSESSMENT: NONE - DENIES PAIN

## 2024-01-19 NOTE — ED NOTES
ED to Inpatient Handoff Report    Notified Lawanda that electronic handoff available and patient ready for transport to room 639.    Safety Risks: Risk of falls    Patient in Restraints: no    Constant Observer or Patient : no    Telemetry Monitoring Ordered :Yes           Order to transfer to unit without monitor:YES    Last MEWS: 1 Time completed: 1630    Deterioration Index Score:   Predictive Model Details          29 (Normal)  Factor Value    Calculated 1/19/2024 16:32 47% Age 89 years old    Deterioration Index Model 23% Hilario coma scale 14     14% Potassium 4.8 mmol/L     12% Systolic 160     4% Hematocrit abnormal (29.3 %)     1% WBC count 8.9 k/uL     0% Temperature 97.9 °F (36.6 °C)     0% Pulse oximetry 96 %     0% Respiratory rate 16     0% Pulse 70     0% Sodium 139 mmol/L        Vitals:    01/19/24 1215 01/19/24 1630   BP: (!) 160/71 (!) 160/79   Pulse: 81 70   Resp: 15 16   Temp: 98.1 °F (36.7 °C) 97.9 °F (36.6 °C)   TempSrc: Oral Oral   SpO2: 99% 96%   Weight: 61.2 kg (135 lb)    Height: 1.778 m (5' 10\")          Opportunity for questions and clarification was provided.

## 2024-01-19 NOTE — ED PROVIDER NOTES
[facility] INTERMEDIATE  EMERGENCY DEPARTMENT ENCOUNTER        Pt Name: Juan Sotelo  MRN: [id number]  Birthdate 10/6/1934  Date of evaluation: 1/19/2024  Provider: Emily Kauffman MD  PCP: Ronni Fried MD  Note Started: 4:40 PM EST 1/19/24    CHIEF COMPLAINT       Chief Complaint   Patient presents with    Shortness of Breath     For the past two days; Recent discharge from hospital for Cdiff, states there was possible pneumonia. Patient SPO2 99% at intake.        HISTORY OF PRESENT ILLNESS: 1 or more Elements     Juan Sotelo is a 89 y.o. male who presents with SOB. Patient is accompanied by daughter who is able to provide all history.  Patient states that patient was recently admitted to the hospital for pneumonia, patient was discharged to rehab facility where he developed C. difficile.  Patient had continuously deteriorated but was discharged home 2 days ago.  Daughter at bedside states that they have set up for home rehab but have not started yet.  States that patient is very weak and not acting his normal self.  Daughter states that home nurse today had a lot of crackles and recommended to bring patient to the ED.ROS limited due to patient hard of hearing.     Nursing Notes were all reviewed and agreed with or any disagreements were addressed in the HPI.    ROS:   Pertinent positives and negatives are stated within HPI, all other systems reviewed and are negative.      --------------------------------------------- PAST HISTORY ---------------------------------------------  Past Medical History:  has a past medical history of AAA (abdominal aortic aneurysm) ([facility]), Arrhythmia, Arthritis, CAD (coronary artery disease), CHF (congestive heart failure) ([facility]), History of tobacco use, Hyperlipidemia with target low density lipoprotein (LDL) cholesterol less than 70 mg/dL, Hypertension, Jaundice, persistent, S/P CABG x 2, S/P MVR (mitral valve replacement), S/P TVR (tricuspid valve repair), Swelling,

## 2024-01-19 NOTE — ED TRIAGE NOTES
FIRST PROVIDER CONTACT ASSESSMENT NOTE       Department of Emergency Medicine                 First Provider Note            24  12:16 PM EST    Date of Encounter: No admission date for patient encounter.    Patient Name: Juan Sotelo  : 10/6/1934  MRN: [id number]    Chief Complaint: Shortness of Breath (For the past two days)      History of Present Illness:   Juan Sotelo is a 89 y.o. male who presents to the ED for SOB.   Cough reported.   Concern for fluid in lungs by PCP.   Was just in hospital for pneumonia in December.    Ears flushed earlier this week with decreased hearing reported.         Focused Physical Exam:  VS:    ED Triage Vitals   BP Temp Temp src Pulse Resp SpO2 Height Weight   -- -- -- -- -- -- -- --        Physical Ex: Constitutional: Alert and non-toxic.    Medical History:  has a past medical history of AAA (abdominal aortic aneurysm), Arrhythmia, Arthritis, CAD (coronary artery disease), CHF (congestive heart failure) ([facility]), History of tobacco use, Hyperlipidemia with target low density lipoprotein (LDL) cholesterol less than 70 mg/dL, Hypertension, Jaundice, persistent, S/P CABG x 2, S/P MVR (mitral valve replacement), S/P TVR (tricuspid valve repair), Swelling, Tiredness, and Weight loss.  Surgical History:  has a past surgical history that includes Pilonidal cyst excision ('s); Elbow surgery (); Pacemaker insertion (); Cardiac surgery (); AAA repair, endovascular (2015); Coronary artery bypass graft; Cardiac defibrillator placement (2016); Diagnostic Cardiac Cath Lab Procedure; Cardiac pacemaker placement; Cardiac defibrillator placement (Left, 2022); and Cataract removal with implant (Bilateral).  Social History:  reports that he quit smoking about 13 years ago. His smoking use included cigarettes. He started smoking about 63 years ago. He has a 75.0 pack-year smoking history. He has never used smokeless tobacco. He reports current

## 2024-01-20 PROBLEM — E43 SEVERE PROTEIN-CALORIE MALNUTRITION (HCC): Status: ACTIVE | Noted: 2024-01-20

## 2024-01-20 LAB
ANION GAP SERPL CALCULATED.3IONS-SCNC: 11 MMOL/L (ref 7–16)
BASOPHILS # BLD: 0.06 K/UL (ref 0–0.2)
BASOPHILS NFR BLD: 1 % (ref 0–2)
BUN SERPL-MCNC: 18 MG/DL (ref 6–23)
CALCIUM SERPL-MCNC: 8.7 MG/DL (ref 8.6–10.2)
CHLORIDE SERPL-SCNC: 101 MMOL/L (ref 98–107)
CHOLEST SERPL-MCNC: 103 MG/DL
CO2 SERPL-SCNC: 25 MMOL/L (ref 22–29)
CREAT SERPL-MCNC: 0.9 MG/DL (ref 0.7–1.2)
EOSINOPHIL # BLD: 0.34 K/UL (ref 0.05–0.5)
EOSINOPHILS RELATIVE PERCENT: 4 % (ref 0–6)
ERYTHROCYTE [DISTWIDTH] IN BLOOD BY AUTOMATED COUNT: 17.7 % (ref 11.5–15)
GFR SERPL CREATININE-BSD FRML MDRD: >60 ML/MIN/1.73M2
GLUCOSE SERPL-MCNC: 81 MG/DL (ref 74–99)
HCT VFR BLD AUTO: 29.3 % (ref 37–54)
HDLC SERPL-MCNC: 43 MG/DL
HGB BLD-MCNC: 9.4 G/DL (ref 12.5–16.5)
IMM GRANULOCYTES # BLD AUTO: <0.03 K/UL (ref 0–0.58)
IMM GRANULOCYTES NFR BLD: 0 % (ref 0–5)
LDLC SERPL CALC-MCNC: 48 MG/DL
LYMPHOCYTES NFR BLD: 1.21 K/UL (ref 1.5–4)
LYMPHOCYTES RELATIVE PERCENT: 13 % (ref 20–42)
MAGNESIUM SERPL-MCNC: 1.8 MG/DL (ref 1.6–2.6)
MCH RBC QN AUTO: 31.4 PG (ref 26–35)
MCHC RBC AUTO-ENTMCNC: 32.1 G/DL (ref 32–34.5)
MCV RBC AUTO: 98 FL (ref 80–99.9)
MONOCYTES NFR BLD: 0.65 K/UL (ref 0.1–0.95)
MONOCYTES NFR BLD: 7 % (ref 2–12)
NEUTROPHILS NFR BLD: 75 % (ref 43–80)
NEUTS SEG NFR BLD: 6.84 K/UL (ref 1.8–7.3)
PLATELET # BLD AUTO: 231 K/UL (ref 130–450)
PMV BLD AUTO: 11 FL (ref 7–12)
POTASSIUM SERPL-SCNC: 4.2 MMOL/L (ref 3.5–5)
RBC # BLD AUTO: 2.99 M/UL (ref 3.8–5.8)
SODIUM SERPL-SCNC: 137 MMOL/L (ref 132–146)
TRIGL SERPL-MCNC: 60 MG/DL
VLDLC SERPL CALC-MCNC: 12 MG/DL
WBC OTHER # BLD: 9.1 K/UL (ref 4.5–11.5)

## 2024-01-20 PROCEDURE — 80061 LIPID PANEL: CPT

## 2024-01-20 PROCEDURE — 6360000002 HC RX W HCPCS: Performed by: NURSE PRACTITIONER

## 2024-01-20 PROCEDURE — 83735 ASSAY OF MAGNESIUM: CPT

## 2024-01-20 PROCEDURE — 85025 COMPLETE CBC W/AUTO DIFF WBC: CPT

## 2024-01-20 PROCEDURE — 6370000000 HC RX 637 (ALT 250 FOR IP): Performed by: FAMILY MEDICINE

## 2024-01-20 PROCEDURE — 2580000003 HC RX 258: Performed by: NURSE PRACTITIONER

## 2024-01-20 PROCEDURE — 80048 BASIC METABOLIC PNL TOTAL CA: CPT

## 2024-01-20 PROCEDURE — 2060000000 HC ICU INTERMEDIATE R&B

## 2024-01-20 PROCEDURE — 6370000000 HC RX 637 (ALT 250 FOR IP): Performed by: NURSE PRACTITIONER

## 2024-01-20 RX ADMIN — SODIUM CHLORIDE, PRESERVATIVE FREE 10 ML: 5 INJECTION INTRAVENOUS at 20:38

## 2024-01-20 RX ADMIN — PRAVASTATIN SODIUM 20 MG: 20 TABLET ORAL at 10:06

## 2024-01-20 RX ADMIN — MIDODRINE HYDROCHLORIDE 5 MG: 5 TABLET ORAL at 16:54

## 2024-01-20 RX ADMIN — MIDODRINE HYDROCHLORIDE 5 MG: 5 TABLET ORAL at 13:59

## 2024-01-20 RX ADMIN — BUMETANIDE 1 MG: 0.25 INJECTION INTRAMUSCULAR; INTRAVENOUS at 20:34

## 2024-01-20 RX ADMIN — Medication 1 CAPSULE: at 10:01

## 2024-01-20 RX ADMIN — CHOLESTYRAMINE 4 G: 4 POWDER, FOR SUSPENSION ORAL at 10:02

## 2024-01-20 RX ADMIN — ENOXAPARIN SODIUM 40 MG: 100 INJECTION SUBCUTANEOUS at 10:01

## 2024-01-20 RX ADMIN — CYPROHEPTADINE HYDROCHLORIDE 4 MG: 4 TABLET ORAL at 10:06

## 2024-01-20 RX ADMIN — SACUBITRIL AND VALSARTAN 1 TABLET: 24; 26 TABLET, FILM COATED ORAL at 10:02

## 2024-01-20 RX ADMIN — METOPROLOL SUCCINATE 25 MG: 25 TABLET, EXTENDED RELEASE ORAL at 10:01

## 2024-01-20 RX ADMIN — SODIUM CHLORIDE, PRESERVATIVE FREE 10 ML: 5 INJECTION INTRAVENOUS at 10:01

## 2024-01-20 RX ADMIN — PETROLATUM: 420 OINTMENT TOPICAL at 10:16

## 2024-01-20 RX ADMIN — MIDODRINE HYDROCHLORIDE 5 MG: 5 TABLET ORAL at 10:01

## 2024-01-20 RX ADMIN — DICYCLOMINE HYDROCHLORIDE 10 MG: 10 CAPSULE ORAL at 10:02

## 2024-01-20 RX ADMIN — DICYCLOMINE HYDROCHLORIDE 10 MG: 10 CAPSULE ORAL at 20:34

## 2024-01-20 RX ADMIN — CHOLESTYRAMINE 4 G: 4 POWDER, FOR SUSPENSION ORAL at 20:34

## 2024-01-20 RX ADMIN — BUMETANIDE 1 MG: 0.25 INJECTION INTRAMUSCULAR; INTRAVENOUS at 10:01

## 2024-01-20 RX ADMIN — SACUBITRIL AND VALSARTAN 1 TABLET: 24; 26 TABLET, FILM COATED ORAL at 20:34

## 2024-01-20 ASSESSMENT — PAIN SCALES - GENERAL: PAINLEVEL_OUTOF10: 0

## 2024-01-20 NOTE — PLAN OF CARE
Problem: Safety - Adult  Goal: Free from fall injury  1/20/2024 0558 by Jose Manuel Caraballo, RN  Outcome: Progressing     Problem: ABCDS Injury Assessment  Goal: Absence of physical injury  1/20/2024 0558 by Jose Manuel Caraballo, RN  Outcome: Progressing     Problem: Skin/Tissue Integrity  Goal: Absence of new skin breakdown  Description: 1.  Monitor for areas of redness and/or skin breakdown  2.  Assess vascular access sites hourly  3.  Every 4-6 hours minimum:  Change oxygen saturation probe site  4.  Every 4-6 hours:  If on nasal continuous positive airway pressure, respiratory therapy assess nares and determine need for appliance change or resting period.  1/20/2024 0558 by Jose Manuel Caraballo, RN  Outcome: Progressing

## 2024-01-20 NOTE — CONSULTS
Comprehensive Nutrition Assessment    Type and Reason for Visit:  Initial, Consult, Patient Education (CHF)    Nutrition Recommendations/Plan:   Continue current diet/ONS as tolerated & monitor     Malnutrition Assessment:  Malnutrition Status:  Severe malnutrition (01/20/24 1301)    Context:  Chronic Illness     Findings of the 6 clinical characteristics of malnutrition:  Energy Intake:  75% or less estimated energy requirements for 1 month or longer  Weight Loss:  Unable to assess (d/t CHF)     Body Fat Loss:  Severe body fat loss Orbital   Muscle Mass Loss:  Severe muscle mass loss Temples (temporalis), Clavicles (pectoralis & deltoids)  Fluid Accumulation:  No significant fluid accumulation     Strength:  Not Performed    Nutrition Assessment:    Pt presents w/ SOB, admits w/ acute on chronic CHF. Hx CAD, recent hx C.diff. Pt w/ severe malnutrition, ONS added per discussion w/ pt's daughter. Continue current diet/ONS & monitor.    Nutrition Related Findings:    A&O, [place] (hearing aides not w/ him), -2.1L, no edema, abd WDL, Periactin   Wound Type: Wound Consult Pending (red/purple area b/l buttocks/coccyx per pending wound consult)       Current Nutrition Intake & Therapies:    Average Meal Intake:  (intake has been poor per pt's daughter)  Average Supplements Intake:  (ONS ordered at this time, some of ONS provided from home)  ADULT DIET; Regular; No Added Salt (3-4 gm)  ADULT ORAL NUTRITION SUPPLEMENT; Breakfast, Dinner; Standard High Calorie/High Protein Oral Supplement    Anthropometric Measures:  Height: 177.8 cm (5' 10\")  Ideal Body Weight (IBW): 166 lbs (75 kg)    Admission Body Weight: 57.1 kg (125 lb 12.8 oz) (1/20 bed)  Current Body Weight: 57.1 kg (125 lb 12.8 oz) (1/20), 75.8 % IBW. Weight Source: Bed Scale  Current BMI (kg/m2): 18.1  Usual Body Weight: 60.2 kg (132 lb 13 oz) (3/2023 actual per EMR)  % Weight Change (Calculated): -5.3                    BMI Categories: Underweight (BMI less than

## 2024-01-20 NOTE — H&P
supraclavicular lymphadenopathy  Lungs: Clear to auscultation, WOB normal  CV: RRR, no MRGs, no lower extremity edema  Abdomen: Soft, non-tender; no masses or HSM, +BS  Extremities: FROM without synovitis.  No clubbing or cyanosis of the hands.  Skin: no rash, induration, lesions, or ulcers  Unable to assess psych and neuro systems    LABS:  All labs reviewed.  Of note:  CBC with Differential:    Lab Results   Component Value Date/Time    WBC 9.1 01/20/2024 02:53 AM    RBC 2.99 01/20/2024 02:53 AM    HGB 9.4 01/20/2024 02:53 AM    HCT 29.3 01/20/2024 02:53 AM     01/20/2024 02:53 AM    MCV 98.0 01/20/2024 02:53 AM    MCH 31.4 01/20/2024 02:53 AM    MCHC 32.1 01/20/2024 02:53 AM    RDW 17.7 01/20/2024 02:53 AM    LYMPHOPCT 13 01/20/2024 02:53 AM    MONOPCT 7 01/20/2024 02:53 AM    BASOPCT 1 01/20/2024 02:53 AM    MONOSABS 0.65 01/20/2024 02:53 AM    LYMPHSABS 1.21 01/20/2024 02:53 AM    EOSABS 0.34 01/20/2024 02:53 AM    BASOSABS 0.06 01/20/2024 02:53 AM     CMP:    Lab Results   Component Value Date/Time     01/20/2024 02:53 AM    K 4.2 01/20/2024 02:53 AM     01/20/2024 02:53 AM    CO2 25 01/20/2024 02:53 AM    BUN 18 01/20/2024 02:53 AM    CREATININE 0.9 01/20/2024 02:53 AM    GFRAA >60 05/16/2022 06:46 AM    LABGLOM >60 01/20/2024 02:53 AM    GLUCOSE 81 01/20/2024 02:53 AM    PROT 7.0 01/19/2024 02:25 PM    LABALBU 3.6 01/19/2024 02:25 PM    CALCIUM 8.7 01/20/2024 02:53 AM    BILITOT 1.5 01/19/2024 02:25 PM    ALKPHOS 76 01/19/2024 02:25 PM    AST 20 01/19/2024 02:25 PM    ALT 17 01/19/2024 02:25 PM       Imaging:  I've personally reviewed the patient's   Chest x-ray: Cardiomegaly with pulmonary vascular congestion.  Bilateral pleural effusions slightly larger on the right.  There has been slight overall interval progression.    EKG:  I've personally reviewed the patient's EKG:  Ventricular paced rhythm    Telemetry:  I've personally reviewed the patient's

## 2024-01-21 LAB
ANION GAP SERPL CALCULATED.3IONS-SCNC: 11 MMOL/L (ref 7–16)
BNP SERPL-MCNC: 4282 PG/ML (ref 0–450)
BUN SERPL-MCNC: 27 MG/DL (ref 6–23)
CALCIUM SERPL-MCNC: 8.6 MG/DL (ref 8.6–10.2)
CHLORIDE SERPL-SCNC: 100 MMOL/L (ref 98–107)
CO2 SERPL-SCNC: 27 MMOL/L (ref 22–29)
CREAT SERPL-MCNC: 1.1 MG/DL (ref 0.7–1.2)
GFR SERPL CREATININE-BSD FRML MDRD: >60 ML/MIN/1.73M2
GLUCOSE SERPL-MCNC: 90 MG/DL (ref 74–99)
INR PPP: 1.7
MAGNESIUM SERPL-MCNC: 1.8 MG/DL (ref 1.6–2.6)
POTASSIUM SERPL-SCNC: 3.8 MMOL/L (ref 3.5–5)
PROTHROMBIN TIME: 18.3 SEC (ref 9.3–12.4)
SODIUM SERPL-SCNC: 138 MMOL/L (ref 132–146)

## 2024-01-21 PROCEDURE — 6370000000 HC RX 637 (ALT 250 FOR IP)

## 2024-01-21 PROCEDURE — 94640 AIRWAY INHALATION TREATMENT: CPT

## 2024-01-21 PROCEDURE — 6370000000 HC RX 637 (ALT 250 FOR IP): Performed by: NURSE PRACTITIONER

## 2024-01-21 PROCEDURE — 2060000000 HC ICU INTERMEDIATE R&B

## 2024-01-21 PROCEDURE — 6360000002 HC RX W HCPCS: Performed by: NURSE PRACTITIONER

## 2024-01-21 PROCEDURE — 97530 THERAPEUTIC ACTIVITIES: CPT

## 2024-01-21 PROCEDURE — 97161 PT EVAL LOW COMPLEX 20 MIN: CPT

## 2024-01-21 PROCEDURE — 85610 PROTHROMBIN TIME: CPT

## 2024-01-21 PROCEDURE — 80048 BASIC METABOLIC PNL TOTAL CA: CPT

## 2024-01-21 PROCEDURE — 83880 ASSAY OF NATRIURETIC PEPTIDE: CPT

## 2024-01-21 PROCEDURE — 83735 ASSAY OF MAGNESIUM: CPT

## 2024-01-21 PROCEDURE — 2580000003 HC RX 258: Performed by: NURSE PRACTITIONER

## 2024-01-21 RX ORDER — IPRATROPIUM BROMIDE AND ALBUTEROL SULFATE 2.5; .5 MG/3ML; MG/3ML
1 SOLUTION RESPIRATORY (INHALATION)
Status: DISCONTINUED | OUTPATIENT
Start: 2024-01-21 | End: 2024-01-29 | Stop reason: HOSPADM

## 2024-01-21 RX ORDER — WARFARIN SODIUM 2 MG/1
2 TABLET ORAL
Status: COMPLETED | OUTPATIENT
Start: 2024-01-21 | End: 2024-01-21

## 2024-01-21 RX ORDER — ENOXAPARIN SODIUM 100 MG/ML
1 INJECTION SUBCUTANEOUS 2 TIMES DAILY
Status: DISCONTINUED | OUTPATIENT
Start: 2024-01-21 | End: 2024-01-23

## 2024-01-21 RX ADMIN — MIDODRINE HYDROCHLORIDE 5 MG: 5 TABLET ORAL at 09:42

## 2024-01-21 RX ADMIN — DICYCLOMINE HYDROCHLORIDE 10 MG: 10 CAPSULE ORAL at 09:43

## 2024-01-21 RX ADMIN — MIDODRINE HYDROCHLORIDE 5 MG: 5 TABLET ORAL at 12:30

## 2024-01-21 RX ADMIN — Medication 1 CAPSULE: at 09:43

## 2024-01-21 RX ADMIN — IPRATROPIUM BROMIDE AND ALBUTEROL SULFATE 1 DOSE: .5; 2.5 SOLUTION RESPIRATORY (INHALATION) at 16:50

## 2024-01-21 RX ADMIN — IPRATROPIUM BROMIDE AND ALBUTEROL SULFATE 1 DOSE: .5; 2.5 SOLUTION RESPIRATORY (INHALATION) at 20:38

## 2024-01-21 RX ADMIN — IPRATROPIUM BROMIDE AND ALBUTEROL SULFATE 1 DOSE: .5; 2.5 SOLUTION RESPIRATORY (INHALATION) at 13:08

## 2024-01-21 RX ADMIN — SODIUM CHLORIDE, PRESERVATIVE FREE 10 ML: 5 INJECTION INTRAVENOUS at 11:08

## 2024-01-21 RX ADMIN — DICYCLOMINE HYDROCHLORIDE 10 MG: 10 CAPSULE ORAL at 21:28

## 2024-01-21 RX ADMIN — PRAVASTATIN SODIUM 20 MG: 20 TABLET ORAL at 09:43

## 2024-01-21 RX ADMIN — CHOLESTYRAMINE 4 G: 4 POWDER, FOR SUSPENSION ORAL at 21:28

## 2024-01-21 RX ADMIN — BUMETANIDE 1 MG: 0.25 INJECTION INTRAMUSCULAR; INTRAVENOUS at 21:28

## 2024-01-21 RX ADMIN — WARFARIN SODIUM 2 MG: 2 TABLET ORAL at 21:28

## 2024-01-21 RX ADMIN — BUMETANIDE 1 MG: 0.25 INJECTION INTRAMUSCULAR; INTRAVENOUS at 11:07

## 2024-01-21 RX ADMIN — MIDODRINE HYDROCHLORIDE 5 MG: 5 TABLET ORAL at 18:42

## 2024-01-21 RX ADMIN — ENOXAPARIN SODIUM 60 MG: 100 INJECTION SUBCUTANEOUS at 21:27

## 2024-01-21 RX ADMIN — ENOXAPARIN SODIUM 40 MG: 100 INJECTION SUBCUTANEOUS at 09:43

## 2024-01-21 RX ADMIN — CYPROHEPTADINE HYDROCHLORIDE 4 MG: 4 TABLET ORAL at 12:30

## 2024-01-21 RX ADMIN — SODIUM CHLORIDE, PRESERVATIVE FREE 10 ML: 5 INJECTION INTRAVENOUS at 21:28

## 2024-01-21 RX ADMIN — CHOLESTYRAMINE 4 G: 4 POWDER, FOR SUSPENSION ORAL at 09:43

## 2024-01-21 RX ADMIN — Medication 5 DROP: at 21:29

## 2024-01-21 ASSESSMENT — PAIN SCALES - GENERAL
PAINLEVEL_OUTOF10: 0

## 2024-01-21 NOTE — CONSULTS
I was contacted via DialedIN regarding an otolaryngology consult: \"Patient recently had his ears flushed out and is now having trouble hearing since then.\"    Typically, the evaluation of hearing loss includes audiometric testing, and is most practically performed in the outpatient setting.     Would recommend outpatient otolaryngology follow-up after discharge from the hospital.     Please call/message via DialedIN with any questions/concerns.    Hans De Souza DO 01/20/24 7:26 PM

## 2024-01-21 NOTE — CARE COORDINATION
CASE MANAGEMENT.... Met with patient and his daughter, Kevon, at the bedside. Patient is alert/oriented, but very [place]. 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 Information was provided by Kevon and written communication with Mr Sotelo.     Kevon states her father lives with his wife-who is currently ill herself and unable to assist with his needs. She and her other 2 siblings have been providing care/support for both parents. They live [place] a ranch with 2 steps to enter and he has a nebulizer/walker/cane. Kevon states that patient is becoming progressively weaker because he sits in his chair all day. Has become incontinent because he doesn't want to get up to the bathroom. Per her, he now has wound to his buttocks - wound care on consult.     He has no history of hhc, but was recently in [facility]. Is interested in [facility] again. Had conversation with her and her 2 siblings Siena/Daniel over the phone regarding choices. List of [facility] choices provided. They are interested in the 3 choices from a previous admission. 1) [facility]-was out of network and patient has same insurance-they are aware and are therefore, requesting 2) [facility], 3) [facility]. PT/OT on consult. Patient updated on dc plans and is agreeable.     For now, Mr Sotelo is tolerating room air. On iv bumex bid. On Aerosols/Entresto/Coumadin pta-currently on lovenox. CHF nurse on consult. Will cont to follow along and assist with needs accordingly.

## 2024-01-21 NOTE — ACP (ADVANCE CARE PLANNING)
Advance Care Planning   Healthcare Decision Maker:    Primary Decision Maker: Irma Sotelo - Spouse - [phone]    Secondary Decision Maker: Daniel Sotelo - Child - [phone]    Secondary Decision Maker: Kevon Kent - Child - [phone]    Secondary Decision Maker: Siena Amaral - Child - [phone]

## 2024-01-22 LAB
ANION GAP SERPL CALCULATED.3IONS-SCNC: 15 MMOL/L (ref 7–16)
BASOPHILS # BLD: 0.07 K/UL (ref 0–0.2)
BASOPHILS NFR BLD: 1 % (ref 0–2)
BUN SERPL-MCNC: 38 MG/DL (ref 6–23)
CALCIUM SERPL-MCNC: 8.6 MG/DL (ref 8.6–10.2)
CHLORIDE SERPL-SCNC: 101 MMOL/L (ref 98–107)
CO2 SERPL-SCNC: 25 MMOL/L (ref 22–29)
CREAT SERPL-MCNC: 1.7 MG/DL (ref 0.7–1.2)
EKG ATRIAL RATE: 122 BPM
EKG Q-T INTERVAL: 476 MS
EKG QRS DURATION: 140 MS
EKG QTC CALCULATION (BAZETT): 514 MS
EKG R AXIS: -63 DEGREES
EKG T AXIS: 77 DEGREES
EKG VENTRICULAR RATE: 70 BPM
EOSINOPHIL # BLD: 0.26 K/UL (ref 0.05–0.5)
EOSINOPHILS RELATIVE PERCENT: 3 % (ref 0–6)
ERYTHROCYTE [DISTWIDTH] IN BLOOD BY AUTOMATED COUNT: 17.5 % (ref 11.5–15)
GFR SERPL CREATININE-BSD FRML MDRD: 39 ML/MIN/1.73M2
GLUCOSE SERPL-MCNC: 93 MG/DL (ref 74–99)
HCT VFR BLD AUTO: 30.1 % (ref 37–54)
HGB BLD-MCNC: 9.8 G/DL (ref 12.5–16.5)
IMM GRANULOCYTES # BLD AUTO: 0.04 K/UL (ref 0–0.58)
IMM GRANULOCYTES NFR BLD: 0 % (ref 0–5)
INR PPP: 1.7
LYMPHOCYTES NFR BLD: 1.38 K/UL (ref 1.5–4)
LYMPHOCYTES RELATIVE PERCENT: 14 % (ref 20–42)
MAGNESIUM SERPL-MCNC: 1.8 MG/DL (ref 1.6–2.6)
MCH RBC QN AUTO: 32.5 PG (ref 26–35)
MCHC RBC AUTO-ENTMCNC: 32.6 G/DL (ref 32–34.5)
MCV RBC AUTO: 99.7 FL (ref 80–99.9)
MONOCYTES NFR BLD: 0.6 K/UL (ref 0.1–0.95)
MONOCYTES NFR BLD: 6 % (ref 2–12)
NEUTROPHILS NFR BLD: 76 % (ref 43–80)
NEUTS SEG NFR BLD: 7.22 K/UL (ref 1.8–7.3)
PLATELET # BLD AUTO: 221 K/UL (ref 130–450)
PMV BLD AUTO: 10.5 FL (ref 7–12)
POTASSIUM SERPL-SCNC: 3.6 MMOL/L (ref 3.5–5)
PROTHROMBIN TIME: 18.8 SEC (ref 9.3–12.4)
RBC # BLD AUTO: 3.02 M/UL (ref 3.8–5.8)
SODIUM SERPL-SCNC: 141 MMOL/L (ref 132–146)
WBC OTHER # BLD: 9.6 K/UL (ref 4.5–11.5)

## 2024-01-22 PROCEDURE — 2060000000 HC ICU INTERMEDIATE R&B

## 2024-01-22 PROCEDURE — 94640 AIRWAY INHALATION TREATMENT: CPT

## 2024-01-22 PROCEDURE — 6370000000 HC RX 637 (ALT 250 FOR IP): Performed by: NURSE PRACTITIONER

## 2024-01-22 PROCEDURE — 93010 ELECTROCARDIOGRAM REPORT: CPT | Performed by: INTERNAL MEDICINE

## 2024-01-22 PROCEDURE — 85610 PROTHROMBIN TIME: CPT

## 2024-01-22 PROCEDURE — 6370000000 HC RX 637 (ALT 250 FOR IP)

## 2024-01-22 PROCEDURE — 36415 COLL VENOUS BLD VENIPUNCTURE: CPT

## 2024-01-22 PROCEDURE — 83735 ASSAY OF MAGNESIUM: CPT

## 2024-01-22 PROCEDURE — 80048 BASIC METABOLIC PNL TOTAL CA: CPT

## 2024-01-22 PROCEDURE — 2580000003 HC RX 258: Performed by: NURSE PRACTITIONER

## 2024-01-22 PROCEDURE — 6360000002 HC RX W HCPCS: Performed by: NURSE PRACTITIONER

## 2024-01-22 PROCEDURE — 85025 COMPLETE CBC W/AUTO DIFF WBC: CPT

## 2024-01-22 RX ORDER — WARFARIN SODIUM 2 MG/1
2 TABLET ORAL
Status: COMPLETED | OUTPATIENT
Start: 2024-01-22 | End: 2024-01-22

## 2024-01-22 RX ADMIN — IPRATROPIUM BROMIDE AND ALBUTEROL SULFATE 1 DOSE: .5; 2.5 SOLUTION RESPIRATORY (INHALATION) at 21:05

## 2024-01-22 RX ADMIN — DICYCLOMINE HYDROCHLORIDE 10 MG: 10 CAPSULE ORAL at 20:08

## 2024-01-22 RX ADMIN — IPRATROPIUM BROMIDE AND ALBUTEROL SULFATE 1 DOSE: .5; 2.5 SOLUTION RESPIRATORY (INHALATION) at 08:41

## 2024-01-22 RX ADMIN — IPRATROPIUM BROMIDE AND ALBUTEROL SULFATE 1 DOSE: .5; 2.5 SOLUTION RESPIRATORY (INHALATION) at 17:39

## 2024-01-22 RX ADMIN — MIDODRINE HYDROCHLORIDE 5 MG: 5 TABLET ORAL at 09:04

## 2024-01-22 RX ADMIN — Medication 5 DROP: at 20:08

## 2024-01-22 RX ADMIN — SACUBITRIL AND VALSARTAN 1 TABLET: 24; 26 TABLET, FILM COATED ORAL at 09:04

## 2024-01-22 RX ADMIN — IPRATROPIUM BROMIDE AND ALBUTEROL SULFATE 1 DOSE: .5; 2.5 SOLUTION RESPIRATORY (INHALATION) at 13:09

## 2024-01-22 RX ADMIN — Medication 5 DROP: at 09:05

## 2024-01-22 RX ADMIN — MIDODRINE HYDROCHLORIDE 5 MG: 5 TABLET ORAL at 17:14

## 2024-01-22 RX ADMIN — SODIUM CHLORIDE, PRESERVATIVE FREE 10 ML: 5 INJECTION INTRAVENOUS at 20:07

## 2024-01-22 RX ADMIN — METOPROLOL SUCCINATE 25 MG: 25 TABLET, EXTENDED RELEASE ORAL at 09:03

## 2024-01-22 RX ADMIN — CHOLESTYRAMINE 4 G: 4 POWDER, FOR SUSPENSION ORAL at 20:08

## 2024-01-22 RX ADMIN — CHOLESTYRAMINE 4 G: 4 POWDER, FOR SUSPENSION ORAL at 09:04

## 2024-01-22 RX ADMIN — ENOXAPARIN SODIUM 60 MG: 100 INJECTION SUBCUTANEOUS at 20:08

## 2024-01-22 RX ADMIN — PRAVASTATIN SODIUM 20 MG: 20 TABLET ORAL at 09:04

## 2024-01-22 RX ADMIN — MIDODRINE HYDROCHLORIDE 5 MG: 5 TABLET ORAL at 12:11

## 2024-01-22 RX ADMIN — SODIUM CHLORIDE, PRESERVATIVE FREE 10 ML: 5 INJECTION INTRAVENOUS at 09:05

## 2024-01-22 RX ADMIN — Medication 1 CAPSULE: at 09:04

## 2024-01-22 RX ADMIN — CYPROHEPTADINE HYDROCHLORIDE 4 MG: 4 TABLET ORAL at 09:04

## 2024-01-22 RX ADMIN — BUMETANIDE 1 MG: 0.25 INJECTION INTRAMUSCULAR; INTRAVENOUS at 09:03

## 2024-01-22 RX ADMIN — ENOXAPARIN SODIUM 60 MG: 100 INJECTION SUBCUTANEOUS at 09:04

## 2024-01-22 RX ADMIN — WARFARIN SODIUM 2 MG: 2 TABLET ORAL at 17:14

## 2024-01-22 RX ADMIN — DICYCLOMINE HYDROCHLORIDE 10 MG: 10 CAPSULE ORAL at 09:03

## 2024-01-22 ASSESSMENT — PAIN SCALES - GENERAL: PAINLEVEL_OUTOF10: 0

## 2024-01-22 NOTE — CARE COORDINATION
Social Work / Discharge Planning : SW noted CM SNF choices yesterday. Referrals made to Nakul at [facility] and Mar from [facility]. Await acceptance/ denial. Will need pre-cert. SW to follow. Electronically signed by FORREST Gunter on 1/22/24 at 9:18 AM EST     Addendum: Ashley from [facility] did update JIMMIE that [facility] does NOT have a bed but [facility] may. Still awaiting a decision from [facility]. N17 generated and transport forms completed.  SW to follow. Electronically signed by FORREST Gunter on 1/22/24 at 1:53 PM EST

## 2024-01-22 NOTE — PLAN OF CARE
Patient's chart updated to reflect:      .    - HF care plan, HF education points and HF discharge instructions.  -Orders: 2 gram sodium diet, daily weights, I/O.  -PCP and cardiology follow up appointments to be scheduled within 7 days of hospital discharge.  -CHF education session will be provided to the patient prior to hospital discharge.    Barbara Jo RN   Heart Failure Navigator

## 2024-01-22 NOTE — DISCHARGE INSTRUCTIONS
***HEART FAILURE - CONGESTIVE HEART FAILURE***  DISCHARGE INSTRUCTIONS:  GUIDELINES TO FOLLOW AT [facility]/SNF/ Assisted Living    No future appointments.       MEDICATIONS:  Please notify the doctor if patient is not able to take their medications or if medications are being held for any reasons (such as low blood pressure ect.)  Do not give the patient ibuprofen (Advil or Motrin), naproxen (Aleve) without talking to the doctor first. This could make their heart failure worse.         WEIGHT MONITORING:   Weigh patient every day in the morning after they void (If patient is able to stand, please get a standing weight.)   Notify the doctor of a weight gain of 3 pounds or more in 1 day   OR  a total of 5 pounds or more in 1 week             DIET   Cardiac heart healthy diet:  Low sodium diet: no  more than 2,000mg (2 grams) of salt / sodium per day (which equals to a little less than  a teaspoon of salt)/ Cardiac Diet: Low saturated / low trans fat, no added salt, caffeine restricted    If patient is there for rehab and will be returning home in the near future; reinforce with the patient and the family to follow a low sodium diet (2,000 mg)- avoid using salt at the table, avoid / limit use of canned soups, processed / packaged foods, salted snacks, olives and pickles.  Do not use a salt substitute without checking with the doctor. (Mrs. Epstein is safe to use).       NOTIFY THE DOCTOR THE FIRST DAY OF ONSET OF ANY OF THESE   SYMPTOMS:   Weight gain of 3 pounds or more in 1 day         OR 5 pounds or more in one week  More shortness of breath  More swelling in stomach, legs, ankles or feet  Feeling more tired, No energy  Dry hacky cough  Dizziness  More chest pain / discomfort  Hard time breathing laying down

## 2024-01-22 NOTE — DISCHARGE INSTR - COC
Agency   Name: [facility]  Address:[address] [address]  Phone:  Fax:    Dialysis Facility (if applicable)   Name:  Address:  Dialysis Schedule:  Phone:  Fax:    / signature: {Esignature:[id number]}Electronically signed by FORREST Gunter on 1/24/24 at 2:07 PM EST     PHYSICIAN SECTION    Prognosis: {Prognosis:7214811162}    Condition at Discharge: Stable    Rehab Potential (if transferring to Rehab): {Prognosis:7255282652}    Recommended Labs or Other Treatments After Discharge: ***    Physician Certification: I certify the above information and transfer of Juan Sotelo  is necessary for the continuing treatment of the diagnosis listed and that he requires Skilled Nursing Facility for less 30 days.     Update Admission H&P: {CHP DME Changes in HandP:821370512}    PHYSICIAN SIGNATURE:  {Esignature:[id number]}

## 2024-01-22 NOTE — FLOWSHEET NOTE
Inpatient Wound Care (Initial consult) 639    Admit Date: 1/19/2024 12:48 PM    Reason for consult:  coccyx    Significant history:  per H&P    Chief Complaint   Patient presents with    Shortness of Breath       For the past two days; Recent discharge from hospital for Cdiff, states there was possible pneumonia. Patient SPO2 99% at intake.      Past medical history of AAA, CAD CHF hyperlipidemia hypertension status post CABG x 2, atrial fibrillation and pacemaker in situ.      Wound history:  present on admission    Findings:     01/22/24 0959   Wound 01/19/24 Coccyx   Date First Assessed/Time First Assessed: 01/19/24 1817   Present on Original Admission: Yes  Location: Coccyx   Wound Image    Wound Etiology Pressure Stage 2   Wound Cleansed Soap and water   Dressing/Treatment Pharmaceutical agent (see MAR)  (Aquaphor)   Wound Length (cm) 2 cm   Wound Width (cm) 1.5 cm   Wound Depth (cm) 0.1 cm   Wound Surface Area (cm^2) 3 cm^2   Change in Wound Size % (l*w) -500   Wound Volume (cm^3) 0.3 cm^3   Wound Healing % -500   Wound Assessment Pink/red   Drainage Amount Scant (moist but unmeasurable)   Drainage Description Serosanguinous   Dianne-wound Assessment Dry/flaky;Erosion   Wound 01/22/24 Elbow Left   Date First Assessed/Time First Assessed: 01/22/24 1000   Present on Original Admission: Yes  Location: Elbow  Wound Location Orientation: Left   Wound Image    Wound Etiology Skin Tear   Dressing/Treatment Open to air   Wound Length (cm) 1 cm   Wound Width (cm) 1 cm   Wound Depth (cm) 0.1 cm   Wound Surface Area (cm^2) 1 cm^2   Wound Volume (cm^3) 0.1 cm^3   Wound Assessment Pink/red   Drainage Amount Scant (moist but unmeasurable)   Drainage Description Serosanguinous   Odor None   Dianne-wound Assessment Dry/flaky;Ecchymosis;Fragile     Both heels intact, blanching    **Informed Consent**    The patient has given verbal consent to have photos taken of wounds and inserted into their chart as part of their permanent

## 2024-01-23 LAB
ANION GAP SERPL CALCULATED.3IONS-SCNC: 14 MMOL/L (ref 7–16)
BASOPHILS # BLD: 0.06 K/UL (ref 0–0.2)
BASOPHILS NFR BLD: 1 % (ref 0–2)
BNP SERPL-MCNC: 3270 PG/ML (ref 0–450)
BUN SERPL-MCNC: 40 MG/DL (ref 6–23)
CALCIUM SERPL-MCNC: 8.3 MG/DL (ref 8.6–10.2)
CHLORIDE SERPL-SCNC: 100 MMOL/L (ref 98–107)
CO2 SERPL-SCNC: 25 MMOL/L (ref 22–29)
CREAT SERPL-MCNC: 1.4 MG/DL (ref 0.7–1.2)
EOSINOPHIL # BLD: 0.29 K/UL (ref 0.05–0.5)
EOSINOPHILS RELATIVE PERCENT: 3 % (ref 0–6)
ERYTHROCYTE [DISTWIDTH] IN BLOOD BY AUTOMATED COUNT: 17.5 % (ref 11.5–15)
GFR SERPL CREATININE-BSD FRML MDRD: 49 ML/MIN/1.73M2
GLUCOSE SERPL-MCNC: 89 MG/DL (ref 74–99)
HCT VFR BLD AUTO: 28.3 % (ref 37–54)
HGB BLD-MCNC: 9.1 G/DL (ref 12.5–16.5)
IMM GRANULOCYTES # BLD AUTO: 0.06 K/UL (ref 0–0.58)
IMM GRANULOCYTES NFR BLD: 1 % (ref 0–5)
INR PPP: 2.1
LYMPHOCYTES NFR BLD: 1.1 K/UL (ref 1.5–4)
LYMPHOCYTES RELATIVE PERCENT: 11 % (ref 20–42)
MAGNESIUM SERPL-MCNC: 1.7 MG/DL (ref 1.6–2.6)
MCH RBC QN AUTO: 33 PG (ref 26–35)
MCHC RBC AUTO-ENTMCNC: 32.2 G/DL (ref 32–34.5)
MCV RBC AUTO: 102.5 FL (ref 80–99.9)
MONOCYTES NFR BLD: 0.61 K/UL (ref 0.1–0.95)
MONOCYTES NFR BLD: 6 % (ref 2–12)
NEUTROPHILS NFR BLD: 78 % (ref 43–80)
NEUTS SEG NFR BLD: 7.5 K/UL (ref 1.8–7.3)
PLATELET # BLD AUTO: 216 K/UL (ref 130–450)
PMV BLD AUTO: 10.7 FL (ref 7–12)
POTASSIUM SERPL-SCNC: 3.6 MMOL/L (ref 3.5–5)
PROTHROMBIN TIME: 23.6 SEC (ref 9.3–12.4)
RBC # BLD AUTO: 2.76 M/UL (ref 3.8–5.8)
SODIUM SERPL-SCNC: 139 MMOL/L (ref 132–146)
WBC OTHER # BLD: 9.6 K/UL (ref 4.5–11.5)

## 2024-01-23 PROCEDURE — 94640 AIRWAY INHALATION TREATMENT: CPT

## 2024-01-23 PROCEDURE — 6370000000 HC RX 637 (ALT 250 FOR IP)

## 2024-01-23 PROCEDURE — 2580000003 HC RX 258: Performed by: NURSE PRACTITIONER

## 2024-01-23 PROCEDURE — 6370000000 HC RX 637 (ALT 250 FOR IP): Performed by: NURSE PRACTITIONER

## 2024-01-23 PROCEDURE — 2060000000 HC ICU INTERMEDIATE R&B

## 2024-01-23 PROCEDURE — 36415 COLL VENOUS BLD VENIPUNCTURE: CPT

## 2024-01-23 PROCEDURE — 85610 PROTHROMBIN TIME: CPT

## 2024-01-23 PROCEDURE — 83880 ASSAY OF NATRIURETIC PEPTIDE: CPT

## 2024-01-23 PROCEDURE — 6360000002 HC RX W HCPCS: Performed by: NURSE PRACTITIONER

## 2024-01-23 PROCEDURE — 85025 COMPLETE CBC W/AUTO DIFF WBC: CPT

## 2024-01-23 PROCEDURE — 80048 BASIC METABOLIC PNL TOTAL CA: CPT

## 2024-01-23 PROCEDURE — 83735 ASSAY OF MAGNESIUM: CPT

## 2024-01-23 RX ORDER — WARFARIN SODIUM 2 MG/1
2 TABLET ORAL
Status: COMPLETED | OUTPATIENT
Start: 2024-01-23 | End: 2024-01-23

## 2024-01-23 RX ADMIN — SACUBITRIL AND VALSARTAN 1 TABLET: 24; 26 TABLET, FILM COATED ORAL at 20:35

## 2024-01-23 RX ADMIN — CHOLESTYRAMINE 4 G: 4 POWDER, FOR SUSPENSION ORAL at 20:26

## 2024-01-23 RX ADMIN — PRAVASTATIN SODIUM 20 MG: 20 TABLET ORAL at 08:01

## 2024-01-23 RX ADMIN — IPRATROPIUM BROMIDE AND ALBUTEROL SULFATE 1 DOSE: .5; 2.5 SOLUTION RESPIRATORY (INHALATION) at 13:41

## 2024-01-23 RX ADMIN — Medication 1 CAPSULE: at 08:00

## 2024-01-23 RX ADMIN — IPRATROPIUM BROMIDE AND ALBUTEROL SULFATE 1 DOSE: .5; 2.5 SOLUTION RESPIRATORY (INHALATION) at 08:52

## 2024-01-23 RX ADMIN — CYPROHEPTADINE HYDROCHLORIDE 4 MG: 4 TABLET ORAL at 08:01

## 2024-01-23 RX ADMIN — MIDODRINE HYDROCHLORIDE 5 MG: 5 TABLET ORAL at 17:37

## 2024-01-23 RX ADMIN — DICYCLOMINE HYDROCHLORIDE 10 MG: 10 CAPSULE ORAL at 08:00

## 2024-01-23 RX ADMIN — DICYCLOMINE HYDROCHLORIDE 10 MG: 10 CAPSULE ORAL at 20:26

## 2024-01-23 RX ADMIN — SODIUM CHLORIDE, PRESERVATIVE FREE 10 ML: 5 INJECTION INTRAVENOUS at 08:01

## 2024-01-23 RX ADMIN — IPRATROPIUM BROMIDE AND ALBUTEROL SULFATE 1 DOSE: .5; 2.5 SOLUTION RESPIRATORY (INHALATION) at 16:43

## 2024-01-23 RX ADMIN — MIDODRINE HYDROCHLORIDE 5 MG: 5 TABLET ORAL at 11:36

## 2024-01-23 RX ADMIN — SACUBITRIL AND VALSARTAN 1 TABLET: 24; 26 TABLET, FILM COATED ORAL at 08:00

## 2024-01-23 RX ADMIN — Medication 5 DROP: at 08:01

## 2024-01-23 RX ADMIN — IPRATROPIUM BROMIDE AND ALBUTEROL SULFATE 1 DOSE: .5; 2.5 SOLUTION RESPIRATORY (INHALATION) at 20:49

## 2024-01-23 RX ADMIN — WARFARIN SODIUM 2 MG: 2 TABLET ORAL at 17:37

## 2024-01-23 RX ADMIN — ENOXAPARIN SODIUM 60 MG: 100 INJECTION SUBCUTANEOUS at 10:18

## 2024-01-23 RX ADMIN — PETROLATUM: 420 OINTMENT TOPICAL at 10:26

## 2024-01-23 RX ADMIN — MIDODRINE HYDROCHLORIDE 5 MG: 5 TABLET ORAL at 08:00

## 2024-01-23 RX ADMIN — METOPROLOL SUCCINATE 25 MG: 25 TABLET, EXTENDED RELEASE ORAL at 08:05

## 2024-01-23 RX ADMIN — Medication 5 DROP: at 20:27

## 2024-01-23 RX ADMIN — CHOLESTYRAMINE 4 G: 4 POWDER, FOR SUSPENSION ORAL at 08:00

## 2024-01-23 ASSESSMENT — PAIN SCALES - GENERAL: PAINLEVEL_OUTOF10: 0

## 2024-01-23 NOTE — CONSULTS
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 (10/6/1934) male with a history of HFpEF, most recent EF:  Lab Results   Component Value Date    LVEF 50 04/19/2022       Patient was awake and alert, laying in bed during the consultation and is agreeable to heart failure education. He was engaged and asked appropriate questions throughout the education session. His daughter was at the bedside. He will be going to rehab after discharge.    Barriers identified during consult contributing to HF Hospitalization:  [] Limited medication adherence   [] Poor health literacy, education regarding HF medications provided   [] Pill box provided to patient  [] Difficulty affording medications  [] Prescription assistance information given     [] Not weighing themselves daily  [x] Weight log provided for easy monitoring  [] Scale provided     [] Not following low sodium diet  [] Food insecurity   [x] 2 gram sodium diet education provided   [] Low sodium recipes provided  [] Sodium free seasoning provided   [] Low sodium meal delivery options given to patient  [] Dietician consulted     [] Lack of transportation to appointments     [] Depression, given chronic illness  [] Primary team notified     [] Goals of care need addressed  [] Palliative care consulted     [] CHF CHW consulted, to assist with     Chart Reviewed:  Diet: ADULT DIET; Regular; No Added Salt (3-4 gm)  ADULT ORAL NUTRITION SUPPLEMENT; Breakfast, Dinner; Standard High Calorie/High Protein Oral Supplement   Daily Weights: Patient Vitals for the past 96 hrs (Last 3 readings):   Weight   01/23/24 0446 58.9 kg (129 lb 13.6 oz)   01/22/24 0437 59 kg (130 lb)   01/20/24 0518 57.1 kg (125 lb 12.8 oz)     I/O:   Intake/Output Summary (Last 24 hours) at 1/23/2024 1308  Last data filed at 1/23/2024 0618  Gross per 24 hour   Intake --   Output 1150 ml   Net -1150 ml       []

## 2024-01-23 NOTE — CARE COORDINATION
Social Work / Discharge Planning :[facility] is full. JIMMIE called and updated the spouse. Her daughter argelia will be in this afternoon to visit and will reveiw the list. AWait additional choices. JIMMIE to follow. Electronically signed by FORREST Gunter on 1/23/24 at 2:49 PM EST

## 2024-01-23 NOTE — PLAN OF CARE
Problem: Skin/Tissue Integrity  Goal: Absence of new skin breakdown  Description: 1.  Monitor for areas of redness and/or skin breakdown  2.  Assess vascular access sites hourly  3.  Every 4-6 hours minimum:  Change oxygen saturation probe site  4.  Every 4-6 hours:  If on nasal continuous positive airway pressure, respiratory therapy assess nares and determine need for appliance change or resting period.  1/22/2024 2230 by Lia Whaley, RN  Outcome: Progressing  1/22/2024 1007 by Levon Santiago, RN  Outcome: Progressing     Problem: Safety - Adult  Goal: Free from fall injury  1/22/2024 2230 by Lia Whaley, RN  Outcome: Progressing  1/22/2024 1007 by Levon Santiago, RN  Outcome: Progressing

## 2024-01-23 NOTE — PLAN OF CARE
Problem: Skin/Tissue Integrity  Goal: Absence of new skin breakdown  Description: 1.  Monitor for areas of redness and/or skin breakdown  2.  Assess vascular access sites hourly  3.  Every 4-6 hours minimum:  Change oxygen saturation probe site  4.  Every 4-6 hours:  If on nasal continuous positive airway pressure, respiratory therapy assess nares and determine need for appliance change or resting period.  Outcome: Progressing     Problem: Safety - Adult  Goal: Free from fall injury  Outcome: Progressing     Problem: ABCDS Injury Assessment  Goal: Absence of physical injury  Outcome: Progressing     Problem: Pain  Goal: Verbalizes/displays adequate comfort level or baseline comfort level  Outcome: Progressing     Problem: Nutrition Deficit:  Goal: Optimize nutritional status  Outcome: Progressing     Problem: Chronic Conditions and Co-morbidities  Goal: Patient's chronic conditions and co-morbidity symptoms are monitored and maintained or improved  Outcome: Progressing  Flowsheets (Taken 1/23/2024 4543)  Care Plan - Patient's Chronic Conditions and Co-Morbidity Symptoms are Monitored and Maintained or Improved: Monitor and assess patient's chronic conditions and comorbid symptoms for stability, deterioration, or improvement

## 2024-01-24 LAB
ANION GAP SERPL CALCULATED.3IONS-SCNC: 15 MMOL/L (ref 7–16)
BASOPHILS # BLD: 0.04 K/UL (ref 0–0.2)
BASOPHILS NFR BLD: 1 % (ref 0–2)
BUN SERPL-MCNC: 46 MG/DL (ref 6–23)
CALCIUM SERPL-MCNC: 8.2 MG/DL (ref 8.6–10.2)
CHLORIDE SERPL-SCNC: 101 MMOL/L (ref 98–107)
CO2 SERPL-SCNC: 24 MMOL/L (ref 22–29)
CREAT SERPL-MCNC: 1.4 MG/DL (ref 0.7–1.2)
EOSINOPHIL # BLD: 0.26 K/UL (ref 0.05–0.5)
EOSINOPHILS RELATIVE PERCENT: 3 % (ref 0–6)
ERYTHROCYTE [DISTWIDTH] IN BLOOD BY AUTOMATED COUNT: 16.9 % (ref 11.5–15)
GFR SERPL CREATININE-BSD FRML MDRD: 48 ML/MIN/1.73M2
GLUCOSE SERPL-MCNC: 89 MG/DL (ref 74–99)
HCT VFR BLD AUTO: 27.4 % (ref 37–54)
HGB BLD-MCNC: 8.7 G/DL (ref 12.5–16.5)
IMM GRANULOCYTES # BLD AUTO: 0.05 K/UL (ref 0–0.58)
IMM GRANULOCYTES NFR BLD: 1 % (ref 0–5)
INR PPP: 2.9
LYMPHOCYTES NFR BLD: 0.93 K/UL (ref 1.5–4)
LYMPHOCYTES RELATIVE PERCENT: 11 % (ref 20–42)
MAGNESIUM SERPL-MCNC: 1.7 MG/DL (ref 1.6–2.6)
MCH RBC QN AUTO: 32.6 PG (ref 26–35)
MCHC RBC AUTO-ENTMCNC: 31.8 G/DL (ref 32–34.5)
MCV RBC AUTO: 102.6 FL (ref 80–99.9)
MONOCYTES NFR BLD: 0.59 K/UL (ref 0.1–0.95)
MONOCYTES NFR BLD: 7 % (ref 2–12)
NEUTROPHILS NFR BLD: 79 % (ref 43–80)
NEUTS SEG NFR BLD: 7.01 K/UL (ref 1.8–7.3)
PLATELET # BLD AUTO: 186 K/UL (ref 130–450)
PMV BLD AUTO: 11 FL (ref 7–12)
POTASSIUM SERPL-SCNC: 3.6 MMOL/L (ref 3.5–5)
PROTHROMBIN TIME: 32.8 SEC (ref 9.3–12.4)
RBC # BLD AUTO: 2.67 M/UL (ref 3.8–5.8)
SODIUM SERPL-SCNC: 140 MMOL/L (ref 132–146)
WBC OTHER # BLD: 8.9 K/UL (ref 4.5–11.5)

## 2024-01-24 PROCEDURE — 2060000000 HC ICU INTERMEDIATE R&B

## 2024-01-24 PROCEDURE — 83735 ASSAY OF MAGNESIUM: CPT

## 2024-01-24 PROCEDURE — 85610 PROTHROMBIN TIME: CPT

## 2024-01-24 PROCEDURE — 36415 COLL VENOUS BLD VENIPUNCTURE: CPT

## 2024-01-24 PROCEDURE — 80048 BASIC METABOLIC PNL TOTAL CA: CPT

## 2024-01-24 PROCEDURE — 6370000000 HC RX 637 (ALT 250 FOR IP)

## 2024-01-24 PROCEDURE — 94640 AIRWAY INHALATION TREATMENT: CPT

## 2024-01-24 PROCEDURE — 2700000000 HC OXYGEN THERAPY PER DAY

## 2024-01-24 PROCEDURE — 85025 COMPLETE CBC W/AUTO DIFF WBC: CPT

## 2024-01-24 PROCEDURE — 97530 THERAPEUTIC ACTIVITIES: CPT

## 2024-01-24 PROCEDURE — 2580000003 HC RX 258: Performed by: NURSE PRACTITIONER

## 2024-01-24 PROCEDURE — 6370000000 HC RX 637 (ALT 250 FOR IP): Performed by: NURSE PRACTITIONER

## 2024-01-24 PROCEDURE — 97165 OT EVAL LOW COMPLEX 30 MIN: CPT

## 2024-01-24 PROCEDURE — 97535 SELF CARE MNGMENT TRAINING: CPT

## 2024-01-24 RX ORDER — BUMETANIDE 0.5 MG/1
0.5 TABLET ORAL
Qty: 30 TABLET | Refills: 0 | DISCHARGE
Start: 2024-01-24

## 2024-01-24 RX ORDER — WARFARIN SODIUM 1 MG/1
1 TABLET ORAL
Status: COMPLETED | OUTPATIENT
Start: 2024-01-24 | End: 2024-01-24

## 2024-01-24 RX ADMIN — MIDODRINE HYDROCHLORIDE 5 MG: 5 TABLET ORAL at 16:54

## 2024-01-24 RX ADMIN — Medication 5 DROP: at 08:58

## 2024-01-24 RX ADMIN — MIDODRINE HYDROCHLORIDE 5 MG: 5 TABLET ORAL at 12:09

## 2024-01-24 RX ADMIN — SODIUM CHLORIDE, PRESERVATIVE FREE 10 ML: 5 INJECTION INTRAVENOUS at 08:58

## 2024-01-24 RX ADMIN — PETROLATUM: 420 OINTMENT TOPICAL at 22:06

## 2024-01-24 RX ADMIN — Medication 1 CAPSULE: at 08:57

## 2024-01-24 RX ADMIN — IPRATROPIUM BROMIDE AND ALBUTEROL SULFATE 1 DOSE: .5; 2.5 SOLUTION RESPIRATORY (INHALATION) at 09:38

## 2024-01-24 RX ADMIN — Medication 5 DROP: at 21:54

## 2024-01-24 RX ADMIN — IPRATROPIUM BROMIDE AND ALBUTEROL SULFATE 1 DOSE: .5; 2.5 SOLUTION RESPIRATORY (INHALATION) at 20:15

## 2024-01-24 RX ADMIN — SACUBITRIL AND VALSARTAN 1 TABLET: 24; 26 TABLET, FILM COATED ORAL at 08:57

## 2024-01-24 RX ADMIN — SODIUM CHLORIDE, PRESERVATIVE FREE 10 ML: 5 INJECTION INTRAVENOUS at 21:54

## 2024-01-24 RX ADMIN — CHOLESTYRAMINE 4 G: 4 POWDER, FOR SUSPENSION ORAL at 08:58

## 2024-01-24 RX ADMIN — DICYCLOMINE HYDROCHLORIDE 10 MG: 10 CAPSULE ORAL at 08:58

## 2024-01-24 RX ADMIN — CHOLESTYRAMINE 4 G: 4 POWDER, FOR SUSPENSION ORAL at 21:54

## 2024-01-24 RX ADMIN — PRAVASTATIN SODIUM 20 MG: 20 TABLET ORAL at 08:57

## 2024-01-24 RX ADMIN — IPRATROPIUM BROMIDE AND ALBUTEROL SULFATE 1 DOSE: .5; 2.5 SOLUTION RESPIRATORY (INHALATION) at 12:42

## 2024-01-24 RX ADMIN — CYPROHEPTADINE HYDROCHLORIDE 4 MG: 4 TABLET ORAL at 08:58

## 2024-01-24 RX ADMIN — MIDODRINE HYDROCHLORIDE 5 MG: 5 TABLET ORAL at 09:01

## 2024-01-24 RX ADMIN — SODIUM CHLORIDE, PRESERVATIVE FREE 10 ML: 5 INJECTION INTRAVENOUS at 00:10

## 2024-01-24 RX ADMIN — WARFARIN SODIUM 1 MG: 1 TABLET ORAL at 16:54

## 2024-01-24 RX ADMIN — DICYCLOMINE HYDROCHLORIDE 10 MG: 10 CAPSULE ORAL at 21:54

## 2024-01-24 RX ADMIN — METOPROLOL SUCCINATE 25 MG: 25 TABLET, EXTENDED RELEASE ORAL at 08:58

## 2024-01-24 ASSESSMENT — PAIN SCALES - GENERAL: PAINLEVEL_OUTOF10: 0

## 2024-01-24 NOTE — PLAN OF CARE
Problem: Safety - Adult  Goal: Free from fall injury  1/23/2024 2235 by Josh Stewart, RN  Outcome: Progressing

## 2024-01-24 NOTE — CARE COORDINATION
Social Work / Discharge Planning : Mar from [facility] updated SW that they will HAVE a bed for patient at [facility]. Therapy updated and Mar instructed to start pre-cert. Patient daughter Siena updated. N 17 generated and HENS and transport forms completed. SW to followed. Await pre-cert . Electronically signed by FORREST Gunter on 1/24/24 at 1:40 PM EST

## 2024-01-25 LAB
BNP SERPL-MCNC: 3402 PG/ML (ref 0–450)
INR PPP: 3.4
PROTHROMBIN TIME: 36.7 SEC (ref 9.3–12.4)

## 2024-01-25 PROCEDURE — 2580000003 HC RX 258: Performed by: NURSE PRACTITIONER

## 2024-01-25 PROCEDURE — 94640 AIRWAY INHALATION TREATMENT: CPT

## 2024-01-25 PROCEDURE — 83880 ASSAY OF NATRIURETIC PEPTIDE: CPT

## 2024-01-25 PROCEDURE — 6370000000 HC RX 637 (ALT 250 FOR IP)

## 2024-01-25 PROCEDURE — 6370000000 HC RX 637 (ALT 250 FOR IP): Performed by: NURSE PRACTITIONER

## 2024-01-25 PROCEDURE — 2060000000 HC ICU INTERMEDIATE R&B

## 2024-01-25 PROCEDURE — 85610 PROTHROMBIN TIME: CPT

## 2024-01-25 RX ADMIN — DICYCLOMINE HYDROCHLORIDE 10 MG: 10 CAPSULE ORAL at 10:15

## 2024-01-25 RX ADMIN — IPRATROPIUM BROMIDE AND ALBUTEROL SULFATE 1 DOSE: .5; 2.5 SOLUTION RESPIRATORY (INHALATION) at 19:56

## 2024-01-25 RX ADMIN — Medication 5 DROP: at 10:16

## 2024-01-25 RX ADMIN — CHOLESTYRAMINE 4 G: 4 POWDER, FOR SUSPENSION ORAL at 10:14

## 2024-01-25 RX ADMIN — IPRATROPIUM BROMIDE AND ALBUTEROL SULFATE 1 DOSE: .5; 2.5 SOLUTION RESPIRATORY (INHALATION) at 09:09

## 2024-01-25 RX ADMIN — MIDODRINE HYDROCHLORIDE 5 MG: 5 TABLET ORAL at 17:27

## 2024-01-25 RX ADMIN — MIDODRINE HYDROCHLORIDE 5 MG: 5 TABLET ORAL at 13:06

## 2024-01-25 RX ADMIN — METOPROLOL SUCCINATE 25 MG: 25 TABLET, EXTENDED RELEASE ORAL at 10:15

## 2024-01-25 RX ADMIN — IPRATROPIUM BROMIDE AND ALBUTEROL SULFATE 1 DOSE: .5; 2.5 SOLUTION RESPIRATORY (INHALATION) at 16:36

## 2024-01-25 RX ADMIN — CYPROHEPTADINE HYDROCHLORIDE 4 MG: 4 TABLET ORAL at 10:15

## 2024-01-25 RX ADMIN — SODIUM CHLORIDE, PRESERVATIVE FREE 10 ML: 5 INJECTION INTRAVENOUS at 20:54

## 2024-01-25 RX ADMIN — CHOLESTYRAMINE 4 G: 4 POWDER, FOR SUSPENSION ORAL at 20:53

## 2024-01-25 RX ADMIN — Medication 1 CAPSULE: at 10:15

## 2024-01-25 RX ADMIN — IPRATROPIUM BROMIDE AND ALBUTEROL SULFATE 1 DOSE: .5; 2.5 SOLUTION RESPIRATORY (INHALATION) at 12:39

## 2024-01-25 RX ADMIN — DICYCLOMINE HYDROCHLORIDE 10 MG: 10 CAPSULE ORAL at 20:53

## 2024-01-25 RX ADMIN — PRAVASTATIN SODIUM 20 MG: 20 TABLET ORAL at 10:15

## 2024-01-25 RX ADMIN — MIDODRINE HYDROCHLORIDE 5 MG: 5 TABLET ORAL at 10:15

## 2024-01-25 RX ADMIN — SODIUM CHLORIDE, PRESERVATIVE FREE 10 ML: 5 INJECTION INTRAVENOUS at 10:19

## 2024-01-25 RX ADMIN — Medication 5 DROP: at 20:54

## 2024-01-25 ASSESSMENT — PAIN SCALES - GENERAL: PAINLEVEL_OUTOF10: 0

## 2024-01-25 NOTE — CARE COORDINATION
Social Work / Discharge Planning : SW reached out to Mar from [facility]  and she is awaiting Authorization for patient for [facility]. Await Auth. JIMMIE to follow. Electronically signed by FORREST Gunter on 1/25/24 at 1:38 PM EST

## 2024-01-26 ENCOUNTER — APPOINTMENT (OUTPATIENT)
Dept: GENERAL RADIOLOGY | Age: 89
DRG: 291 | End: 2024-01-26
Payer: MEDICARE

## 2024-01-26 LAB
ALBUMIN SERPL-MCNC: 3.6 G/DL (ref 3.5–5.2)
ALP SERPL-CCNC: 77 U/L (ref 40–129)
ALT SERPL-CCNC: 13 U/L (ref 0–40)
ANION GAP SERPL CALCULATED.3IONS-SCNC: 15 MMOL/L (ref 7–16)
AST SERPL-CCNC: 18 U/L (ref 0–39)
B PARAP IS1001 DNA NPH QL NAA+NON-PROBE: NOT DETECTED
B PERT DNA SPEC QL NAA+PROBE: NOT DETECTED
B.E.: -0.2 MMOL/L (ref -3–3)
BASOPHILS # BLD: 0 K/UL (ref 0–0.2)
BASOPHILS NFR BLD: 0 % (ref 0–2)
BILIRUB SERPL-MCNC: 1.7 MG/DL (ref 0–1.2)
BILIRUB UR QL STRIP: NEGATIVE
BUN SERPL-MCNC: 44 MG/DL (ref 6–23)
C PNEUM DNA NPH QL NAA+NON-PROBE: NOT DETECTED
CALCIUM SERPL-MCNC: 8.9 MG/DL (ref 8.6–10.2)
CHLORIDE SERPL-SCNC: 101 MMOL/L (ref 98–107)
CLARITY UR: CLEAR
CO2 SERPL-SCNC: 23 MMOL/L (ref 22–29)
COHB: 0.8 % (ref 0–1.5)
COLOR UR: YELLOW
CREAT SERPL-MCNC: 1.2 MG/DL (ref 0.7–1.2)
CRITICAL: ABNORMAL
CRP SERPL HS-MCNC: 90 MG/L (ref 0–5)
DATE ANALYZED: ABNORMAL
DATE OF COLLECTION: ABNORMAL
EKG ATRIAL RATE: 65 BPM
EKG Q-T INTERVAL: 492 MS
EKG QRS DURATION: 138 MS
EKG QTC CALCULATION (BAZETT): 567 MS
EKG R AXIS: -90 DEGREES
EKG T AXIS: 67 DEGREES
EKG VENTRICULAR RATE: 80 BPM
EOSINOPHIL # BLD: 0 K/UL (ref 0.05–0.5)
EOSINOPHILS RELATIVE PERCENT: 0 % (ref 0–6)
ERYTHROCYTE [DISTWIDTH] IN BLOOD BY AUTOMATED COUNT: 16.2 % (ref 11.5–15)
ERYTHROCYTE [SEDIMENTATION RATE] IN BLOOD BY WESTERGREN METHOD: 115 MM/HR (ref 0–15)
FLUAV RNA NPH QL NAA+NON-PROBE: NOT DETECTED
FLUBV RNA NPH QL NAA+NON-PROBE: NOT DETECTED
GFR SERPL CREATININE-BSD FRML MDRD: 56 ML/MIN/1.73M2
GLUCOSE BLD-MCNC: 141 MG/DL (ref 74–99)
GLUCOSE SERPL-MCNC: 93 MG/DL (ref 74–99)
GLUCOSE UR STRIP-MCNC: NEGATIVE MG/DL
HADV DNA NPH QL NAA+NON-PROBE: NOT DETECTED
HCO3: 22.7 MMOL/L (ref 22–26)
HCOV 229E RNA NPH QL NAA+NON-PROBE: NOT DETECTED
HCOV HKU1 RNA NPH QL NAA+NON-PROBE: NOT DETECTED
HCOV NL63 RNA NPH QL NAA+NON-PROBE: NOT DETECTED
HCOV OC43 RNA NPH QL NAA+NON-PROBE: NOT DETECTED
HCT VFR BLD AUTO: 28.6 % (ref 37–54)
HGB BLD-MCNC: 9.1 G/DL (ref 12.5–16.5)
HGB UR QL STRIP.AUTO: NEGATIVE
HHB: 2.2 % (ref 0–5)
HMPV RNA NPH QL NAA+NON-PROBE: NOT DETECTED
HPIV1 RNA NPH QL NAA+NON-PROBE: NOT DETECTED
HPIV2 RNA NPH QL NAA+NON-PROBE: NOT DETECTED
HPIV3 RNA NPH QL NAA+NON-PROBE: NOT DETECTED
HPIV4 RNA NPH QL NAA+NON-PROBE: NOT DETECTED
INR PPP: 3.5
KETONES UR STRIP-MCNC: NEGATIVE MG/DL
LAB: ABNORMAL
LACTATE BLDV-SCNC: 2.3 MMOL/L (ref 0.5–2.2)
LEUKOCYTE ESTERASE UR QL STRIP: NEGATIVE
LYMPHOCYTES NFR BLD: 0 K/UL (ref 1.5–4)
LYMPHOCYTES RELATIVE PERCENT: 0 % (ref 20–42)
Lab: 1100
M PNEUMO DNA NPH QL NAA+NON-PROBE: NOT DETECTED
MCH RBC QN AUTO: 32 PG (ref 26–35)
MCHC RBC AUTO-ENTMCNC: 31.8 G/DL (ref 32–34.5)
MCV RBC AUTO: 100.7 FL (ref 80–99.9)
METHB: 0.3 % (ref 0–1.5)
MODE: ABNORMAL
MONOCYTES NFR BLD: 0.27 K/UL (ref 0.1–0.95)
MONOCYTES NFR BLD: 2 % (ref 2–12)
NEUTROPHILS NFR BLD: 98 % (ref 43–80)
NEUTS SEG NFR BLD: 15.43 K/UL (ref 1.8–7.3)
NITRITE UR QL STRIP: NEGATIVE
O2 CONTENT: 13 ML/DL
O2 SATURATION: 97.8 % (ref 92–98.5)
O2HB: 96.7 % (ref 94–97)
OPERATOR ID: ABNORMAL
PATIENT TEMP: 37 C
PCO2: 30.5 MMHG (ref 35–45)
PH BLOOD GAS: 7.49 (ref 7.35–7.45)
PH UR STRIP: 5.5 [PH] (ref 5–9)
PLATELET # BLD AUTO: 179 K/UL (ref 130–450)
PMV BLD AUTO: 10.4 FL (ref 7–12)
PO2: 105.1 MMHG (ref 75–100)
POTASSIUM SERPL-SCNC: 3.8 MMOL/L (ref 3.5–5)
PROCALCITONIN SERPL-MCNC: 0.49 NG/ML (ref 0–0.08)
PROT SERPL-MCNC: 7.2 G/DL (ref 6.4–8.3)
PROT UR STRIP-MCNC: NEGATIVE MG/DL
PROTHROMBIN TIME: 39.9 SEC (ref 9.3–12.4)
RBC # BLD AUTO: 2.84 M/UL (ref 3.8–5.8)
RBC # BLD: ABNORMAL 10*6/UL
RBC #/AREA URNS HPF: NORMAL /HPF
RSV RNA NPH QL NAA+NON-PROBE: NOT DETECTED
RV+EV RNA NPH QL NAA+NON-PROBE: NOT DETECTED
SARS-COV-2 RNA NPH QL NAA+NON-PROBE: NOT DETECTED
SODIUM SERPL-SCNC: 139 MMOL/L (ref 132–146)
SOURCE, BLOOD GAS: ABNORMAL
SP GR UR STRIP: 1.02 (ref 1–1.03)
SPECIMEN DESCRIPTION: NORMAL
THB: 9.4 G/DL (ref 11.5–16.5)
TIME ANALYZED: 1105
TROPONIN I SERPL HS-MCNC: 70 NG/L (ref 0–11)
UROBILINOGEN UR STRIP-ACNC: 0.2 EU/DL (ref 0–1)
WBC #/AREA URNS HPF: NORMAL /HPF
WBC OTHER # BLD: 15.7 K/UL (ref 4.5–11.5)

## 2024-01-26 PROCEDURE — 0202U NFCT DS 22 TRGT SARS-COV-2: CPT

## 2024-01-26 PROCEDURE — 93010 ELECTROCARDIOGRAM REPORT: CPT | Performed by: INTERNAL MEDICINE

## 2024-01-26 PROCEDURE — 84145 PROCALCITONIN (PCT): CPT

## 2024-01-26 PROCEDURE — 93005 ELECTROCARDIOGRAM TRACING: CPT | Performed by: HOSPITALIST

## 2024-01-26 PROCEDURE — 87086 URINE CULTURE/COLONY COUNT: CPT

## 2024-01-26 PROCEDURE — 86140 C-REACTIVE PROTEIN: CPT

## 2024-01-26 PROCEDURE — 2580000003 HC RX 258: Performed by: HOSPITALIST

## 2024-01-26 PROCEDURE — 6370000000 HC RX 637 (ALT 250 FOR IP)

## 2024-01-26 PROCEDURE — 80053 COMPREHEN METABOLIC PANEL: CPT

## 2024-01-26 PROCEDURE — 87040 BLOOD CULTURE FOR BACTERIA: CPT

## 2024-01-26 PROCEDURE — 82962 GLUCOSE BLOOD TEST: CPT

## 2024-01-26 PROCEDURE — 85652 RBC SED RATE AUTOMATED: CPT

## 2024-01-26 PROCEDURE — 94640 AIRWAY INHALATION TREATMENT: CPT

## 2024-01-26 PROCEDURE — 82805 BLOOD GASES W/O2 SATURATION: CPT

## 2024-01-26 PROCEDURE — 6360000002 HC RX W HCPCS: Performed by: HOSPITALIST

## 2024-01-26 PROCEDURE — 85025 COMPLETE CBC W/AUTO DIFF WBC: CPT

## 2024-01-26 PROCEDURE — 84484 ASSAY OF TROPONIN QUANT: CPT

## 2024-01-26 PROCEDURE — 2060000000 HC ICU INTERMEDIATE R&B

## 2024-01-26 PROCEDURE — 2500000003 HC RX 250 WO HCPCS: Performed by: FAMILY MEDICINE

## 2024-01-26 PROCEDURE — 5A09457 ASSISTANCE WITH RESPIRATORY VENTILATION, 24-96 CONSECUTIVE HOURS, CONTINUOUS POSITIVE AIRWAY PRESSURE: ICD-10-PCS | Performed by: FAMILY MEDICINE

## 2024-01-26 PROCEDURE — 6360000002 HC RX W HCPCS: Performed by: FAMILY MEDICINE

## 2024-01-26 PROCEDURE — 6370000000 HC RX 637 (ALT 250 FOR IP): Performed by: NURSE PRACTITIONER

## 2024-01-26 PROCEDURE — 2700000000 HC OXYGEN THERAPY PER DAY

## 2024-01-26 PROCEDURE — 51798 US URINE CAPACITY MEASURE: CPT

## 2024-01-26 PROCEDURE — 81001 URINALYSIS AUTO W/SCOPE: CPT

## 2024-01-26 PROCEDURE — 99291 CRITICAL CARE FIRST HOUR: CPT | Performed by: HOSPITALIST

## 2024-01-26 PROCEDURE — 87081 CULTURE SCREEN ONLY: CPT

## 2024-01-26 PROCEDURE — 87154 CUL TYP ID BLD PTHGN 6+ TRGT: CPT

## 2024-01-26 PROCEDURE — 85610 PROTHROMBIN TIME: CPT

## 2024-01-26 PROCEDURE — 94660 CPAP INITIATION&MGMT: CPT

## 2024-01-26 PROCEDURE — 71045 X-RAY EXAM CHEST 1 VIEW: CPT

## 2024-01-26 PROCEDURE — 83605 ASSAY OF LACTIC ACID: CPT

## 2024-01-26 RX ORDER — FUROSEMIDE 10 MG/ML
60 INJECTION INTRAMUSCULAR; INTRAVENOUS ONCE
Status: DISCONTINUED | OUTPATIENT
Start: 2024-01-26 | End: 2024-01-29 | Stop reason: HOSPADM

## 2024-01-26 RX ORDER — FUROSEMIDE 10 MG/ML
40 INJECTION INTRAMUSCULAR; INTRAVENOUS ONCE
Status: COMPLETED | OUTPATIENT
Start: 2024-01-26 | End: 2024-01-26

## 2024-01-26 RX ADMIN — VANCOMYCIN HYDROCHLORIDE 1250 MG: 10 INJECTION, POWDER, LYOPHILIZED, FOR SOLUTION INTRAVENOUS at 13:57

## 2024-01-26 RX ADMIN — Medication 5 DROP: at 21:54

## 2024-01-26 RX ADMIN — MICONAZOLE NITRATE: 20 POWDER TOPICAL at 12:11

## 2024-01-26 RX ADMIN — PETROLATUM: 420 OINTMENT TOPICAL at 21:55

## 2024-01-26 RX ADMIN — FUROSEMIDE 40 MG: 10 INJECTION, SOLUTION INTRAMUSCULAR; INTRAVENOUS at 10:12

## 2024-01-26 RX ADMIN — MICONAZOLE NITRATE: 20 POWDER TOPICAL at 21:55

## 2024-01-26 RX ADMIN — IPRATROPIUM BROMIDE AND ALBUTEROL SULFATE 1 DOSE: .5; 2.5 SOLUTION RESPIRATORY (INHALATION) at 10:16

## 2024-01-26 RX ADMIN — IPRATROPIUM BROMIDE AND ALBUTEROL SULFATE 1 DOSE: .5; 2.5 SOLUTION RESPIRATORY (INHALATION) at 21:59

## 2024-01-26 RX ADMIN — IPRATROPIUM BROMIDE AND ALBUTEROL SULFATE 1 DOSE: .5; 2.5 SOLUTION RESPIRATORY (INHALATION) at 15:53

## 2024-01-26 RX ADMIN — PIPERACILLIN AND TAZOBACTAM 4500 MG: 4; .5 INJECTION, POWDER, FOR SOLUTION INTRAVENOUS at 13:22

## 2024-01-26 RX ADMIN — ACETAMINOPHEN 650 MG: 650 SUPPOSITORY RECTAL at 10:30

## 2024-01-26 RX ADMIN — PIPERACILLIN AND TAZOBACTAM 3375 MG: 3; .375 INJECTION, POWDER, FOR SOLUTION INTRAVENOUS at 17:48

## 2024-01-26 RX ADMIN — MIDODRINE HYDROCHLORIDE 5 MG: 5 TABLET ORAL at 15:50

## 2024-01-26 ASSESSMENT — PAIN SCALES - GENERAL
PAINLEVEL_OUTOF10: 0
PAINLEVEL_OUTOF10: 0

## 2024-01-26 NOTE — CARE COORDINATION
Social Work / Discharge Planning : [facility] received Auth this am and good through Sunday. Patient did have an RRT this am and currently on 7 liters of 02.  If patient is not medically ready to be  discharged over the weekend, will need a new pre-cert for [facility] when appropriate. AWait treatment plan. SW to follow. Electronically signed by FORREST Gunter on 1/26/24 at 12:48 PM EST

## 2024-01-27 LAB
ALBUMIN SERPL-MCNC: 3.2 G/DL (ref 3.5–5.2)
ALP SERPL-CCNC: 71 U/L (ref 40–129)
ALT SERPL-CCNC: 12 U/L (ref 0–40)
ANION GAP SERPL CALCULATED.3IONS-SCNC: 16 MMOL/L (ref 7–16)
AST SERPL-CCNC: 19 U/L (ref 0–39)
BASOPHILS # BLD: 0.03 K/UL (ref 0–0.2)
BASOPHILS NFR BLD: 0 % (ref 0–2)
BILIRUB SERPL-MCNC: 1.3 MG/DL (ref 0–1.2)
BUN SERPL-MCNC: 52 MG/DL (ref 6–23)
CALCIUM SERPL-MCNC: 9 MG/DL (ref 8.6–10.2)
CHLORIDE SERPL-SCNC: 103 MMOL/L (ref 98–107)
CO2 SERPL-SCNC: 22 MMOL/L (ref 22–29)
CREAT SERPL-MCNC: 1.4 MG/DL (ref 0.7–1.2)
CREAT SERPL-MCNC: 1.4 MG/DL (ref 0.7–1.2)
EOSINOPHIL # BLD: 0.12 K/UL (ref 0.05–0.5)
EOSINOPHILS RELATIVE PERCENT: 1 % (ref 0–6)
ERYTHROCYTE [DISTWIDTH] IN BLOOD BY AUTOMATED COUNT: 16.2 % (ref 11.5–15)
GFR SERPL CREATININE-BSD FRML MDRD: 48 ML/MIN/1.73M2
GFR SERPL CREATININE-BSD FRML MDRD: 48 ML/MIN/1.73M2
GLUCOSE SERPL-MCNC: 79 MG/DL (ref 74–99)
HCT VFR BLD AUTO: 26.4 % (ref 37–54)
HGB BLD-MCNC: 8.2 G/DL (ref 12.5–16.5)
IMM GRANULOCYTES # BLD AUTO: 0.12 K/UL (ref 0–0.58)
IMM GRANULOCYTES NFR BLD: 1 % (ref 0–5)
INR PPP: 3.6
LYMPHOCYTES NFR BLD: 0.99 K/UL (ref 1.5–4)
LYMPHOCYTES RELATIVE PERCENT: 9 % (ref 20–42)
MCH RBC QN AUTO: 32 PG (ref 26–35)
MCHC RBC AUTO-ENTMCNC: 31.1 G/DL (ref 32–34.5)
MCV RBC AUTO: 103.1 FL (ref 80–99.9)
MONOCYTES NFR BLD: 0.49 K/UL (ref 0.1–0.95)
MONOCYTES NFR BLD: 5 % (ref 2–12)
NEUTROPHILS NFR BLD: 84 % (ref 43–80)
NEUTS SEG NFR BLD: 8.81 K/UL (ref 1.8–7.3)
PLATELET # BLD AUTO: 141 K/UL (ref 130–450)
PMV BLD AUTO: 11.3 FL (ref 7–12)
POTASSIUM SERPL-SCNC: 3.2 MMOL/L (ref 3.5–5)
PROT SERPL-MCNC: 6.7 G/DL (ref 6.4–8.3)
PROTHROMBIN TIME: 39.2 SEC (ref 9.3–12.4)
RBC # BLD AUTO: 2.56 M/UL (ref 3.8–5.8)
SODIUM SERPL-SCNC: 141 MMOL/L (ref 132–146)
VANCOMYCIN SERPL-MCNC: 12.8 UG/ML (ref 5–40)
WBC OTHER # BLD: 10.6 K/UL (ref 4.5–11.5)

## 2024-01-27 PROCEDURE — 87040 BLOOD CULTURE FOR BACTERIA: CPT

## 2024-01-27 PROCEDURE — 2060000000 HC ICU INTERMEDIATE R&B

## 2024-01-27 PROCEDURE — 85610 PROTHROMBIN TIME: CPT

## 2024-01-27 PROCEDURE — 80053 COMPREHEN METABOLIC PANEL: CPT

## 2024-01-27 PROCEDURE — 94660 CPAP INITIATION&MGMT: CPT

## 2024-01-27 PROCEDURE — 94640 AIRWAY INHALATION TREATMENT: CPT

## 2024-01-27 PROCEDURE — 6360000002 HC RX W HCPCS: Performed by: HOSPITALIST

## 2024-01-27 PROCEDURE — 36415 COLL VENOUS BLD VENIPUNCTURE: CPT

## 2024-01-27 PROCEDURE — 6370000000 HC RX 637 (ALT 250 FOR IP)

## 2024-01-27 PROCEDURE — 2580000003 HC RX 258: Performed by: NURSE PRACTITIONER

## 2024-01-27 PROCEDURE — 2580000003 HC RX 258: Performed by: FAMILY MEDICINE

## 2024-01-27 PROCEDURE — 80202 ASSAY OF VANCOMYCIN: CPT

## 2024-01-27 PROCEDURE — 2580000003 HC RX 258: Performed by: HOSPITALIST

## 2024-01-27 PROCEDURE — 85025 COMPLETE CBC W/AUTO DIFF WBC: CPT

## 2024-01-27 PROCEDURE — 82565 ASSAY OF CREATININE: CPT

## 2024-01-27 PROCEDURE — 2700000000 HC OXYGEN THERAPY PER DAY

## 2024-01-27 RX ORDER — DEXTROSE AND SODIUM CHLORIDE 5; .45 G/100ML; G/100ML
INJECTION, SOLUTION INTRAVENOUS CONTINUOUS
Status: DISCONTINUED | OUTPATIENT
Start: 2024-01-27 | End: 2024-01-29

## 2024-01-27 RX ADMIN — PIPERACILLIN AND TAZOBACTAM 3375 MG: 3; .375 INJECTION, POWDER, FOR SOLUTION INTRAVENOUS at 01:12

## 2024-01-27 RX ADMIN — PIPERACILLIN AND TAZOBACTAM 3375 MG: 3; .375 INJECTION, POWDER, FOR SOLUTION INTRAVENOUS at 10:27

## 2024-01-27 RX ADMIN — PETROLATUM: 420 OINTMENT TOPICAL at 08:20

## 2024-01-27 RX ADMIN — IPRATROPIUM BROMIDE AND ALBUTEROL SULFATE 1 DOSE: .5; 2.5 SOLUTION RESPIRATORY (INHALATION) at 16:44

## 2024-01-27 RX ADMIN — SODIUM CHLORIDE, PRESERVATIVE FREE 10 ML: 5 INJECTION INTRAVENOUS at 08:33

## 2024-01-27 RX ADMIN — SODIUM CHLORIDE, PRESERVATIVE FREE 10 ML: 5 INJECTION INTRAVENOUS at 21:49

## 2024-01-27 RX ADMIN — MICONAZOLE NITRATE: 20 POWDER TOPICAL at 21:49

## 2024-01-27 RX ADMIN — MICONAZOLE NITRATE: 20 POWDER TOPICAL at 08:20

## 2024-01-27 RX ADMIN — Medication 5 DROP: at 10:17

## 2024-01-27 RX ADMIN — IPRATROPIUM BROMIDE AND ALBUTEROL SULFATE 1 DOSE: .5; 2.5 SOLUTION RESPIRATORY (INHALATION) at 13:40

## 2024-01-27 RX ADMIN — PETROLATUM: 420 OINTMENT TOPICAL at 15:25

## 2024-01-27 RX ADMIN — IPRATROPIUM BROMIDE AND ALBUTEROL SULFATE 1 DOSE: .5; 2.5 SOLUTION RESPIRATORY (INHALATION) at 09:39

## 2024-01-27 RX ADMIN — VANCOMYCIN HYDROCHLORIDE 750 MG: 1 INJECTION, POWDER, LYOPHILIZED, FOR SOLUTION INTRAVENOUS at 08:16

## 2024-01-27 RX ADMIN — PIPERACILLIN AND TAZOBACTAM 3375 MG: 3; .375 INJECTION, POWDER, FOR SOLUTION INTRAVENOUS at 17:12

## 2024-01-27 RX ADMIN — PETROLATUM: 420 OINTMENT TOPICAL at 21:48

## 2024-01-27 RX ADMIN — DEXTROSE AND SODIUM CHLORIDE: 5; 450 INJECTION, SOLUTION INTRAVENOUS at 15:16

## 2024-01-27 RX ADMIN — MICONAZOLE NITRATE: 20 POWDER TOPICAL at 15:25

## 2024-01-27 RX ADMIN — Medication 5 DROP: at 21:48

## 2024-01-27 RX ADMIN — IPRATROPIUM BROMIDE AND ALBUTEROL SULFATE 1 DOSE: .5; 2.5 SOLUTION RESPIRATORY (INHALATION) at 19:49

## 2024-01-27 ASSESSMENT — PAIN SCALES - GENERAL: PAINLEVEL_OUTOF10: 0

## 2024-01-27 NOTE — PLAN OF CARE
Problem: Skin/Tissue Integrity  Goal: Absence of new skin breakdown  Description: 1.  Monitor for areas of redness and/or skin breakdown  2.  Assess vascular access sites hourly  3.  Every 4-6 hours minimum:  Change oxygen saturation probe site  4.  Every 4-6 hours:  If on nasal continuous positive airway pressure, respiratory therapy assess nares and determine need for appliance change or resting period.  Outcome: Progressing     Problem: Safety - Adult  Goal: Free from fall injury  Outcome: Progressing  Flowsheets (Taken 1/26/2024 0900 by Lupe Catherine RN)  Free From Fall Injury: Instruct family/caregiver on patient safety     Problem: ABCDS Injury Assessment  Goal: Absence of physical injury  Outcome: Progressing  Flowsheets (Taken 1/26/2024 0900 by Lupe Catherine RN)  Absence of Physical Injury: Implement safety measures based on patient assessment     Problem: Pain  Goal: Verbalizes/displays adequate comfort level or baseline comfort level  Outcome: Progressing  Flowsheets (Taken 1/26/2024 0745 by Lupe Catherine RN)  Verbalizes/displays adequate comfort level or baseline comfort level:   Encourage patient to monitor pain and request assistance   Assess pain using appropriate pain scale   Administer analgesics based on type and severity of pain and evaluate response   Implement non-pharmacological measures as appropriate and evaluate response   Consider cultural and social influences on pain and pain management   Notify Licensed Independent Practitioner if interventions unsuccessful or patient reports new pain     Problem: Chronic Conditions and Co-morbidities  Goal: Patient's chronic conditions and co-morbidity symptoms are monitored and maintained or improved  Outcome: Progressing  Flowsheets (Taken 1/26/2024 0745 by Lupe Catherine RN)  Care Plan - Patient's Chronic Conditions and Co-Morbidity Symptoms are Monitored and Maintained or Improved:   Monitor and assess patient's chronic

## 2024-01-28 LAB
ALBUMIN SERPL-MCNC: 3 G/DL (ref 3.5–5.2)
ALP SERPL-CCNC: 53 U/L (ref 40–129)
ALT SERPL-CCNC: 12 U/L (ref 0–40)
ANION GAP SERPL CALCULATED.3IONS-SCNC: 12 MMOL/L (ref 7–16)
AST SERPL-CCNC: 16 U/L (ref 0–39)
BASOPHILS # BLD: 0.03 K/UL (ref 0–0.2)
BASOPHILS NFR BLD: 0 % (ref 0–2)
BILIRUB SERPL-MCNC: 1 MG/DL (ref 0–1.2)
BUN SERPL-MCNC: 49 MG/DL (ref 6–23)
CALCIUM SERPL-MCNC: 8.6 MG/DL (ref 8.6–10.2)
CHLORIDE SERPL-SCNC: 110 MMOL/L (ref 98–107)
CO2 SERPL-SCNC: 22 MMOL/L (ref 22–29)
CREAT SERPL-MCNC: 1.5 MG/DL (ref 0.7–1.2)
EOSINOPHIL # BLD: 0.22 K/UL (ref 0.05–0.5)
EOSINOPHILS RELATIVE PERCENT: 2 % (ref 0–6)
ERYTHROCYTE [DISTWIDTH] IN BLOOD BY AUTOMATED COUNT: 16.2 % (ref 11.5–15)
GFR SERPL CREATININE-BSD FRML MDRD: 45 ML/MIN/1.73M2
GLUCOSE SERPL-MCNC: 109 MG/DL (ref 74–99)
HCT VFR BLD AUTO: 24.8 % (ref 37–54)
HGB BLD-MCNC: 7.8 G/DL (ref 12.5–16.5)
IMM GRANULOCYTES # BLD AUTO: 0.05 K/UL (ref 0–0.58)
IMM GRANULOCYTES NFR BLD: 1 % (ref 0–5)
INR PPP: 2.6
LYMPHOCYTES NFR BLD: 0.9 K/UL (ref 1.5–4)
LYMPHOCYTES RELATIVE PERCENT: 10 % (ref 20–42)
MCH RBC QN AUTO: 31.5 PG (ref 26–35)
MCHC RBC AUTO-ENTMCNC: 31.5 G/DL (ref 32–34.5)
MCV RBC AUTO: 100 FL (ref 80–99.9)
MICROORGANISM SPEC CULT: NO GROWTH
MICROORGANISM SPEC CULT: NORMAL
MONOCYTES NFR BLD: 0.52 K/UL (ref 0.1–0.95)
MONOCYTES NFR BLD: 6 % (ref 2–12)
NEUTROPHILS NFR BLD: 81 % (ref 43–80)
NEUTS SEG NFR BLD: 7.38 K/UL (ref 1.8–7.3)
PLATELET # BLD AUTO: 137 K/UL (ref 130–450)
PMV BLD AUTO: 10.8 FL (ref 7–12)
POTASSIUM SERPL-SCNC: 3.1 MMOL/L (ref 3.5–5)
PROT SERPL-MCNC: 6.3 G/DL (ref 6.4–8.3)
PROTHROMBIN TIME: 29.4 SEC (ref 9.3–12.4)
RBC # BLD AUTO: 2.48 M/UL (ref 3.8–5.8)
SODIUM SERPL-SCNC: 144 MMOL/L (ref 132–146)
SPECIMEN DESCRIPTION: NORMAL
SPECIMEN DESCRIPTION: NORMAL
WBC OTHER # BLD: 9.1 K/UL (ref 4.5–11.5)

## 2024-01-28 PROCEDURE — 6360000002 HC RX W HCPCS: Performed by: HOSPITALIST

## 2024-01-28 PROCEDURE — 6370000000 HC RX 637 (ALT 250 FOR IP)

## 2024-01-28 PROCEDURE — 2580000003 HC RX 258: Performed by: NURSE PRACTITIONER

## 2024-01-28 PROCEDURE — 85025 COMPLETE CBC W/AUTO DIFF WBC: CPT

## 2024-01-28 PROCEDURE — 85610 PROTHROMBIN TIME: CPT

## 2024-01-28 PROCEDURE — 2060000000 HC ICU INTERMEDIATE R&B

## 2024-01-28 PROCEDURE — 2700000000 HC OXYGEN THERAPY PER DAY

## 2024-01-28 PROCEDURE — 2500000003 HC RX 250 WO HCPCS: Performed by: FAMILY MEDICINE

## 2024-01-28 PROCEDURE — 6370000000 HC RX 637 (ALT 250 FOR IP): Performed by: FAMILY MEDICINE

## 2024-01-28 PROCEDURE — 6360000002 HC RX W HCPCS: Performed by: FAMILY MEDICINE

## 2024-01-28 PROCEDURE — P9047 ALBUMIN (HUMAN), 25%, 50ML: HCPCS | Performed by: FAMILY MEDICINE

## 2024-01-28 PROCEDURE — 6370000000 HC RX 637 (ALT 250 FOR IP): Performed by: NURSE PRACTITIONER

## 2024-01-28 PROCEDURE — 2580000003 HC RX 258: Performed by: HOSPITALIST

## 2024-01-28 PROCEDURE — 94640 AIRWAY INHALATION TREATMENT: CPT

## 2024-01-28 PROCEDURE — 94660 CPAP INITIATION&MGMT: CPT

## 2024-01-28 PROCEDURE — 80053 COMPREHEN METABOLIC PANEL: CPT

## 2024-01-28 RX ORDER — METOPROLOL TARTRATE 1 MG/ML
2.5 INJECTION, SOLUTION INTRAVENOUS EVERY 6 HOURS
Status: DISCONTINUED | OUTPATIENT
Start: 2024-01-28 | End: 2024-01-28

## 2024-01-28 RX ORDER — METOPROLOL SUCCINATE 25 MG/1
25 TABLET, EXTENDED RELEASE ORAL DAILY
Status: DISCONTINUED | OUTPATIENT
Start: 2024-01-28 | End: 2024-01-29 | Stop reason: HOSPADM

## 2024-01-28 RX ORDER — ALBUMIN (HUMAN) 12.5 G/50ML
50 SOLUTION INTRAVENOUS ONCE
Status: COMPLETED | OUTPATIENT
Start: 2024-01-28 | End: 2024-01-28

## 2024-01-28 RX ORDER — WARFARIN SODIUM 2 MG/1
2 TABLET ORAL
Status: COMPLETED | OUTPATIENT
Start: 2024-01-28 | End: 2024-01-28

## 2024-01-28 RX ADMIN — IPRATROPIUM BROMIDE AND ALBUTEROL SULFATE 1 DOSE: .5; 2.5 SOLUTION RESPIRATORY (INHALATION) at 08:18

## 2024-01-28 RX ADMIN — WARFARIN SODIUM 2 MG: 2 TABLET ORAL at 17:07

## 2024-01-28 RX ADMIN — METOPROLOL TARTRATE 2.5 MG: 5 INJECTION INTRAVENOUS at 10:48

## 2024-01-28 RX ADMIN — PIPERACILLIN AND TAZOBACTAM 3375 MG: 3; .375 INJECTION, POWDER, FOR SOLUTION INTRAVENOUS at 17:05

## 2024-01-28 RX ADMIN — MICONAZOLE NITRATE: 20 POWDER TOPICAL at 15:04

## 2024-01-28 RX ADMIN — SODIUM CHLORIDE, PRESERVATIVE FREE 10 ML: 5 INJECTION INTRAVENOUS at 20:01

## 2024-01-28 RX ADMIN — MICONAZOLE NITRATE: 20 POWDER TOPICAL at 20:00

## 2024-01-28 RX ADMIN — METOPROLOL SUCCINATE 25 MG: 25 TABLET, EXTENDED RELEASE ORAL at 16:59

## 2024-01-28 RX ADMIN — MICONAZOLE NITRATE: 20 POWDER TOPICAL at 08:44

## 2024-01-28 RX ADMIN — VANCOMYCIN HYDROCHLORIDE 750 MG: 1 INJECTION, POWDER, LYOPHILIZED, FOR SOLUTION INTRAVENOUS at 08:49

## 2024-01-28 RX ADMIN — CHOLESTYRAMINE 4 G: 4 POWDER, FOR SUSPENSION ORAL at 19:59

## 2024-01-28 RX ADMIN — Medication 5 DROP: at 20:00

## 2024-01-28 RX ADMIN — PIPERACILLIN AND TAZOBACTAM 3375 MG: 3; .375 INJECTION, POWDER, FOR SOLUTION INTRAVENOUS at 09:07

## 2024-01-28 RX ADMIN — ALBUMIN (HUMAN) 50 G: 0.25 INJECTION, SOLUTION INTRAVENOUS at 11:10

## 2024-01-28 RX ADMIN — DICYCLOMINE HYDROCHLORIDE 10 MG: 10 CAPSULE ORAL at 20:00

## 2024-01-28 RX ADMIN — IPRATROPIUM BROMIDE AND ALBUTEROL SULFATE 1 DOSE: .5; 2.5 SOLUTION RESPIRATORY (INHALATION) at 16:36

## 2024-01-28 RX ADMIN — PETROLATUM: 420 OINTMENT TOPICAL at 20:00

## 2024-01-28 RX ADMIN — SODIUM CHLORIDE, PRESERVATIVE FREE 10 ML: 5 INJECTION INTRAVENOUS at 08:45

## 2024-01-28 RX ADMIN — MIDODRINE HYDROCHLORIDE 5 MG: 5 TABLET ORAL at 16:59

## 2024-01-28 RX ADMIN — IPRATROPIUM BROMIDE AND ALBUTEROL SULFATE 1 DOSE: .5; 2.5 SOLUTION RESPIRATORY (INHALATION) at 13:13

## 2024-01-28 RX ADMIN — IPRATROPIUM BROMIDE AND ALBUTEROL SULFATE 1 DOSE: .5; 2.5 SOLUTION RESPIRATORY (INHALATION) at 21:23

## 2024-01-28 RX ADMIN — Medication 5 DROP: at 08:44

## 2024-01-28 RX ADMIN — PIPERACILLIN AND TAZOBACTAM 3375 MG: 3; .375 INJECTION, POWDER, FOR SOLUTION INTRAVENOUS at 02:25

## 2024-01-28 RX ADMIN — POTASSIUM BICARBONATE 40 MEQ: 782 TABLET, EFFERVESCENT ORAL at 13:46

## 2024-01-28 RX ADMIN — SACUBITRIL AND VALSARTAN 1 TABLET: 24; 26 TABLET, FILM COATED ORAL at 20:00

## 2024-01-28 RX ADMIN — PETROLATUM: 420 OINTMENT TOPICAL at 08:44

## 2024-01-29 VITALS
HEART RATE: 70 BPM | TEMPERATURE: 98.1 F | HEIGHT: 70 IN | OXYGEN SATURATION: 94 % | RESPIRATION RATE: 18 BRPM | BODY MASS INDEX: 19.54 KG/M2 | WEIGHT: 136.47 LBS | SYSTOLIC BLOOD PRESSURE: 121 MMHG | DIASTOLIC BLOOD PRESSURE: 59 MMHG

## 2024-01-29 LAB
ACB COMPLEX DNA BLD POS QL NAA+NON-PROBE: NOT DETECTED
ALBUMIN SERPL-MCNC: 3.2 G/DL (ref 3.5–5.2)
ALP SERPL-CCNC: 51 U/L (ref 40–129)
ALT SERPL-CCNC: 10 U/L (ref 0–40)
ANION GAP SERPL CALCULATED.3IONS-SCNC: 11 MMOL/L (ref 7–16)
AST SERPL-CCNC: 19 U/L (ref 0–39)
B FRAGILIS DNA BLD POS QL NAA+NON-PROBE: NOT DETECTED
BASOPHILS # BLD: 0.03 K/UL (ref 0–0.2)
BASOPHILS NFR BLD: 0 % (ref 0–2)
BILIRUB SERPL-MCNC: 1 MG/DL (ref 0–1.2)
BIOFIRE TEST COMMENT: ABNORMAL
BUN SERPL-MCNC: 35 MG/DL (ref 6–23)
C ALBICANS DNA BLD POS QL NAA+NON-PROBE: NOT DETECTED
C AURIS DNA BLD POS QL NAA+NON-PROBE: NOT DETECTED
C GATTII+NEOFOR DNA BLD POS QL NAA+N-PRB: NOT DETECTED
C GLABRATA DNA BLD POS QL NAA+NON-PROBE: NOT DETECTED
C KRUSEI DNA BLD POS QL NAA+NON-PROBE: NOT DETECTED
C PARAP DNA BLD POS QL NAA+NON-PROBE: NOT DETECTED
C TROPICLS DNA BLD POS QL NAA+NON-PROBE: NOT DETECTED
CALCIUM SERPL-MCNC: 8.9 MG/DL (ref 8.6–10.2)
CHLORIDE SERPL-SCNC: 112 MMOL/L (ref 98–107)
CO2 SERPL-SCNC: 22 MMOL/L (ref 22–29)
CREAT SERPL-MCNC: 1.3 MG/DL (ref 0.7–1.2)
E CLOAC COMP DNA BLD POS NAA+NON-PROBE: NOT DETECTED
E COLI DNA BLD POS QL NAA+NON-PROBE: NOT DETECTED
E FAECALIS DNA BLD POS QL NAA+NON-PROBE: NOT DETECTED
E FAECIUM DNA BLD POS QL NAA+NON-PROBE: NOT DETECTED
ENTEROBACTERALES DNA BLD POS NAA+N-PRB: NOT DETECTED
EOSINOPHIL # BLD: 0.2 K/UL (ref 0.05–0.5)
EOSINOPHILS RELATIVE PERCENT: 3 % (ref 0–6)
ERYTHROCYTE [DISTWIDTH] IN BLOOD BY AUTOMATED COUNT: 16.3 % (ref 11.5–15)
GFR SERPL CREATININE-BSD FRML MDRD: 53 ML/MIN/1.73M2
GLUCOSE SERPL-MCNC: 108 MG/DL (ref 74–99)
GP B STREP DNA BLD POS QL NAA+NON-PROBE: NOT DETECTED
HAEM INFLU DNA BLD POS QL NAA+NON-PROBE: NOT DETECTED
HCT VFR BLD AUTO: 23.4 % (ref 37–54)
HGB BLD-MCNC: 7.4 G/DL (ref 12.5–16.5)
IMM GRANULOCYTES # BLD AUTO: 0.05 K/UL (ref 0–0.58)
IMM GRANULOCYTES NFR BLD: 1 % (ref 0–5)
INR PPP: 3.9
K OXYTOCA DNA BLD POS QL NAA+NON-PROBE: NOT DETECTED
KLEBSIELLA SP DNA BLD POS QL NAA+NON-PRB: NOT DETECTED
KLEBSIELLA SP DNA BLD POS QL NAA+NON-PRB: NOT DETECTED
L MONOCYTOG DNA BLD POS QL NAA+NON-PROBE: NOT DETECTED
LYMPHOCYTES NFR BLD: 0.9 K/UL (ref 1.5–4)
LYMPHOCYTES RELATIVE PERCENT: 12 % (ref 20–42)
MCH RBC QN AUTO: 31.2 PG (ref 26–35)
MCHC RBC AUTO-ENTMCNC: 31.6 G/DL (ref 32–34.5)
MCV RBC AUTO: 98.7 FL (ref 80–99.9)
MECA+MECC+MREJ ISLT/SPM QL: DETECTED
MICROORGANISM SPEC CULT: ABNORMAL
MICROORGANISM/AGENT SPEC: ABNORMAL
MONOCYTES NFR BLD: 0.41 K/UL (ref 0.1–0.95)
MONOCYTES NFR BLD: 6 % (ref 2–12)
N MEN DNA BLD POS QL NAA+NON-PROBE: NOT DETECTED
NEUTROPHILS NFR BLD: 78 % (ref 43–80)
NEUTS SEG NFR BLD: 5.71 K/UL (ref 1.8–7.3)
P AERUGINOSA DNA BLD POS NAA+NON-PROBE: NOT DETECTED
PLATELET # BLD AUTO: 128 K/UL (ref 130–450)
PMV BLD AUTO: 11.2 FL (ref 7–12)
POTASSIUM SERPL-SCNC: 3.3 MMOL/L (ref 3.5–5)
PROT SERPL-MCNC: 6.2 G/DL (ref 6.4–8.3)
PROTEUS SP DNA BLD POS QL NAA+NON-PROBE: NOT DETECTED
PROTHROMBIN TIME: 43.1 SEC (ref 9.3–12.4)
RBC # BLD AUTO: 2.37 M/UL (ref 3.8–5.8)
S AUREUS DNA BLD POS QL NAA+NON-PROBE: DETECTED
S AUREUS+CONS DNA BLD POS NAA+NON-PROBE: DETECTED
S EPIDERMIDIS DNA BLD POS QL NAA+NON-PRB: NOT DETECTED
S LUGDUNENSIS DNA BLD POS QL NAA+NON-PRB: NOT DETECTED
S MALTOPHILIA DNA BLD POS QL NAA+NON-PRB: NOT DETECTED
S MARCESCENS DNA BLD POS NAA+NON-PROBE: NOT DETECTED
S PNEUM DNA BLD POS QL NAA+NON-PROBE: NOT DETECTED
S PYO DNA BLD POS QL NAA+NON-PROBE: NOT DETECTED
SALMONELLA DNA BLD POS QL NAA+NON-PROBE: NOT DETECTED
SERVICE CMNT-IMP: ABNORMAL
SODIUM SERPL-SCNC: 145 MMOL/L (ref 132–146)
SPECIMEN DESCRIPTION: ABNORMAL
STREPTOCOCCUS DNA BLD POS NAA+NON-PROBE: NOT DETECTED
VANCOMYCIN SERPL-MCNC: 14.3 UG/ML (ref 5–40)
WBC OTHER # BLD: 7.3 K/UL (ref 4.5–11.5)

## 2024-01-29 PROCEDURE — 2580000003 HC RX 258: Performed by: NURSE PRACTITIONER

## 2024-01-29 PROCEDURE — 97530 THERAPEUTIC ACTIVITIES: CPT

## 2024-01-29 PROCEDURE — 36415 COLL VENOUS BLD VENIPUNCTURE: CPT

## 2024-01-29 PROCEDURE — 6360000002 HC RX W HCPCS: Performed by: HOSPITALIST

## 2024-01-29 PROCEDURE — 92526 ORAL FUNCTION THERAPY: CPT

## 2024-01-29 PROCEDURE — 2580000003 HC RX 258: Performed by: HOSPITALIST

## 2024-01-29 PROCEDURE — 2500000003 HC RX 250 WO HCPCS: Performed by: FAMILY MEDICINE

## 2024-01-29 PROCEDURE — 6370000000 HC RX 637 (ALT 250 FOR IP)

## 2024-01-29 PROCEDURE — 85025 COMPLETE CBC W/AUTO DIFF WBC: CPT

## 2024-01-29 PROCEDURE — 6370000000 HC RX 637 (ALT 250 FOR IP): Performed by: NURSE PRACTITIONER

## 2024-01-29 PROCEDURE — 94660 CPAP INITIATION&MGMT: CPT

## 2024-01-29 PROCEDURE — 85610 PROTHROMBIN TIME: CPT

## 2024-01-29 PROCEDURE — 80053 COMPREHEN METABOLIC PANEL: CPT

## 2024-01-29 PROCEDURE — 92610 EVALUATE SWALLOWING FUNCTION: CPT

## 2024-01-29 PROCEDURE — 94640 AIRWAY INHALATION TREATMENT: CPT

## 2024-01-29 PROCEDURE — 80202 ASSAY OF VANCOMYCIN: CPT

## 2024-01-29 PROCEDURE — 6370000000 HC RX 637 (ALT 250 FOR IP): Performed by: FAMILY MEDICINE

## 2024-01-29 RX ORDER — IPRATROPIUM BROMIDE AND ALBUTEROL SULFATE 2.5; .5 MG/3ML; MG/3ML
3 SOLUTION RESPIRATORY (INHALATION) EVERY 6 HOURS
Qty: 360 ML | DISCHARGE
Start: 2024-01-29

## 2024-01-29 RX ADMIN — PIPERACILLIN AND TAZOBACTAM 3375 MG: 3; .375 INJECTION, POWDER, FOR SOLUTION INTRAVENOUS at 09:31

## 2024-01-29 RX ADMIN — METOPROLOL SUCCINATE 25 MG: 25 TABLET, EXTENDED RELEASE ORAL at 09:16

## 2024-01-29 RX ADMIN — IPRATROPIUM BROMIDE AND ALBUTEROL SULFATE 1 DOSE: .5; 2.5 SOLUTION RESPIRATORY (INHALATION) at 13:26

## 2024-01-29 RX ADMIN — PIPERACILLIN AND TAZOBACTAM 3375 MG: 3; .375 INJECTION, POWDER, FOR SOLUTION INTRAVENOUS at 01:16

## 2024-01-29 RX ADMIN — IPRATROPIUM BROMIDE AND ALBUTEROL SULFATE 1 DOSE: .5; 2.5 SOLUTION RESPIRATORY (INHALATION) at 10:03

## 2024-01-29 RX ADMIN — MICONAZOLE NITRATE: 20 POWDER TOPICAL at 09:17

## 2024-01-29 RX ADMIN — SODIUM CHLORIDE, PRESERVATIVE FREE 10 ML: 5 INJECTION INTRAVENOUS at 09:17

## 2024-01-29 RX ADMIN — Medication 1 CAPSULE: at 09:15

## 2024-01-29 RX ADMIN — PRAVASTATIN SODIUM 20 MG: 20 TABLET ORAL at 09:16

## 2024-01-29 RX ADMIN — POTASSIUM BICARBONATE 40 MEQ: 782 TABLET, EFFERVESCENT ORAL at 09:35

## 2024-01-29 RX ADMIN — CHOLESTYRAMINE 4 G: 4 POWDER, FOR SUSPENSION ORAL at 09:15

## 2024-01-29 RX ADMIN — MIDODRINE HYDROCHLORIDE 5 MG: 5 TABLET ORAL at 09:15

## 2024-01-29 RX ADMIN — CYPROHEPTADINE HYDROCHLORIDE 4 MG: 4 TABLET ORAL at 09:15

## 2024-01-29 RX ADMIN — VANCOMYCIN HYDROCHLORIDE 750 MG: 1 INJECTION, POWDER, LYOPHILIZED, FOR SOLUTION INTRAVENOUS at 09:21

## 2024-01-29 RX ADMIN — MICONAZOLE NITRATE: 20 POWDER TOPICAL at 14:14

## 2024-01-29 RX ADMIN — Medication 5 DROP: at 09:16

## 2024-01-29 RX ADMIN — MIDODRINE HYDROCHLORIDE 5 MG: 5 TABLET ORAL at 11:31

## 2024-01-29 RX ADMIN — SACUBITRIL AND VALSARTAN 1 TABLET: 24; 26 TABLET, FILM COATED ORAL at 09:15

## 2024-01-29 RX ADMIN — DICYCLOMINE HYDROCHLORIDE 10 MG: 10 CAPSULE ORAL at 09:16

## 2024-01-29 ASSESSMENT — PAIN SCALES - GENERAL
PAINLEVEL_OUTOF10: 0
PAINLEVEL_OUTOF10: 0

## 2024-01-29 NOTE — CARE COORDINATION
Social Work / Discharge Planning : SW touched  base with Mar from [facility] and she did verify pre-cert is still good through the 31st. Await discharge. Plan [facility]. JIMMIE to follow. Electronically signed by FORREST Gunter on 1/29/24 at 10:06 AM EST

## 2024-01-29 NOTE — PROGRESS NOTES
[facility]   Progress note      Subjective:    The patient is awake and alert.    Looks well, breathing easy, still not able to hear  Brown sputum with small streaks of blood noted that daughter showed me-he is on Coumadin    Objective:    BP (!) 97/55   Pulse 70   Temp 98.3 °F (36.8 °C) (Axillary)   Resp 19   Ht 1.778 m (5' 10\")   Wt 59 kg (130 lb)   SpO2 100%   BMI 18.65 kg/m²     In: 10 [I.V.:10]  Out: 1550   In: 10   Out: 1550 [Urine:1550]    General appearance: NAD, hard of hearing unable to understand anything I am saying  HEENT: AT/NC, MMM  Neck: FROM, supple  Lungs: Markedly improved breath sounds, no longer gurgling or coarse  CV: RRR, no MRGs  Vasc: Radial pulses 2+  Abdomen: Soft, non-tender; no masses or HSM  Extremities: No peripheral edema or digital cyanosis  Skin: no rash, lesions or ulcers       Recent Labs     01/20/24  0253 01/22/24  0427   WBC 9.1 9.6   HGB 9.4* 9.8*   HCT 29.3* 30.1*    221       Recent Labs     01/20/24  0253 01/21/24  0401 01/22/24  0427    138 141   K 4.2 3.8 3.6    100 101   CO2 25 27 25   BUN 18 27* 38*   CREATININE 0.9 1.1 1.7*   CALCIUM 8.7 8.6 8.6       Assessment:    Principal Problem:    Bilateral pleural effusion  Active Problems:    Severe protein-calorie malnutrition (HCC)  Resolved Problems:    * No resolved hospital problems. *      Plan:    Patient is an 89-year-old male recently admitted for pneumonia and discharged to facility and is now being admitted to the intermediate unit with:     Bilateral pleural effusions from volume overload  Continue to monitor for infectious etiology although white count is within normal limits at 9  Monitor respiratory status with oxygen saturations  Obtain echocardiogram to assess current LV status as well as valve disease  -Diuresis-Bumex 1 mg twice daily, DuoNebs scheduled, may benefit from Mucomyst  -BMP every other day  -Strict intake and output  -Daily weight  -Heart failure nurse 
    [facility]   Progress note      Subjective:    The patient is awake and alert.    Resting comfortably in bed with daughter at bedside  On room air    Objective:    BP (!) 110/54   Pulse 70   Temp 97.9 °F (36.6 °C) (Oral)   Resp 18   Ht 1.778 m (5' 10\")   Wt 58.9 kg (129 lb 13.6 oz)   SpO2 92%   BMI 18.63 kg/m²     In: -   Out: 1800   In: -   Out: 1800 [Urine:1800]    General appearance: NAD, hard of hearing unable to understand anything I am saying  HEENT: AT/NC, MMM  Neck: FROM, supple  Lungs: Markedly improved breath sounds, no longer gurgling or coarse  CV: RRR, no MRGs  Vasc: Radial pulses 2+  Abdomen: Soft, non-tender; no masses or HSM  Extremities: No peripheral edema or digital cyanosis  Skin: no rash, lesions or ulcers       Recent Labs     01/22/24  0427 01/23/24  0640   WBC 9.6 9.6   HGB 9.8* 9.1*   HCT 30.1* 28.3*    216         Recent Labs     01/21/24  0401 01/22/24  0427 01/23/24  0640    141 139   K 3.8 3.6 3.6    101 100   CO2 27 25 25   BUN 27* 38* 40*   CREATININE 1.1 1.7* 1.4*   CALCIUM 8.6 8.6 8.3*         Assessment:    Principal Problem:    Bilateral pleural effusion  Active Problems:    Severe protein-calorie malnutrition (HCC)  Resolved Problems:    * No resolved hospital problems. *      Plan:    Patient is an 89-year-old male recently admitted for pneumonia and discharged to facility and is now being admitted to the intermediate unit with:     Bilateral pleural effusions from volume overload  Continue to monitor for infectious etiology although white count is within normal limits at 9  Monitor respiratory status with oxygen saturations  Obtain echocardiogram to assess current LV status as well as valve disease  -Diuresis-Bumex 1 mg twice daily, DuoNebs scheduled, may benefit from Mucomyst  -BMP every other day  -Strict intake and output  -Daily weight  -Heart failure nurse consult     History of atrial fibrillation on oral 
    [facility]   Progress note      Subjective:    Resting comfortably in bed. Very [place]  Daughter present.    Objective:    BP (!) 99/52   Pulse 70   Temp 98.9 °F (37.2 °C) (Axillary)   Resp 24   Ht 1.778 m (5' 10\")   Wt 60.3 kg (132 lb 15 oz)   SpO2 96%   BMI 19.07 kg/m²     In: -   Out: 700   In: -   Out: 700 [Urine:700]    General appearance: NAD, [place]  HEENT: AT/NC, MMM  Neck: FROM, supple  Lungs: Markedly improved breath sounds, no longer gurgling or coarse  CV: paced, systolic murmur present  Vasc: Radial pulses 2+  Abdomen: Soft, non-tender; no masses or HSM  Extremities: No peripheral edema or digital cyanosis  Skin: no rash, lesions or ulcers       Recent Labs     01/24/24  0627   WBC 8.9   HGB 8.7*   HCT 27.4*          Recent Labs     01/24/24  0627      K 3.6      CO2 24   BUN 46*   CREATININE 1.4*   CALCIUM 8.2*       Assessment:    Principal Problem:    Bilateral pleural effusion  Active Problems:    Severe protein-calorie malnutrition (HCC)  Resolved Problems:    * No resolved hospital problems. *      Plan:    Patient is an 89-year-old male recently admitted for pneumonia and discharged to facility and is now being admitted to the intermediate unit with:     Bilateral pleural effusions from volume overload  Continue to monitor for infectious etiology although white count is within normal limits at 9  Monitor respiratory status with oxygen saturations  Obtain echocardiogram to assess current LV status as well as valve disease  -Diuresis-Bumex 1 mg twice daily, DuoNebs scheduled, may benefit from Mucomyst  -BMP every other day  -Strict intake and output  -Daily weight  -Heart failure nurse consult     History of atrial fibrillation on oral anticoagulation  -Subtherapeutic INR  -Rate control  -Pharmacy consult for dosing    1/21/2024  Looks and sounds much better  Daughter at bedside Case discussed  They would like patient to be placed however he was just 
    [facility]   Progress note      Subjective:    Resting comfortably in bed. Very [place]. Nursing present. Npo to r/o aspiration at this time.   Daughter present.    Objective:    BP (!) 134/55   Pulse 70   Temp 98.2 °F (36.8 °C) (Axillary)   Resp 20   Ht 1.778 m (5' 10\")   Wt 61.9 kg (136 lb 7.4 oz)   SpO2 100%   BMI 19.58 kg/m²     No intake/output data recorded.  No intake/output data recorded.    General appearance: NAD, [place]  HEENT: AT/NC, MMM  Neck: FROM, supple  Lungs: CTAB, on o2 via nc, normal effort  CV: paced, systolic murmur present  Vasc: Radial pulses 2+  Abdomen: Soft, non-tender; no masses or HSM  Extremities: No peripheral edema or digital cyanosis  Skin: no rash, lesions or ulcers       Recent Labs     01/26/24  1106   WBC 15.7*   HGB 9.1*   HCT 28.6*          Recent Labs     01/26/24  1106 01/27/24  0120     --    K 3.8  --      --    CO2 23  --    BUN 44*  --    CREATININE 1.2 1.4*   CALCIUM 8.9  --        Assessment:    Principal Problem:    Bilateral pleural effusion  Active Problems:    Severe protein-calorie malnutrition (HCC)  Resolved Problems:    * No resolved hospital problems. *      Plan:    Patient is an 89-year-old male recently admitted for pneumonia and discharged to facility and is now being admitted to the intermediate unit with:     Bilateral pleural effusions from volume overload  Continue to monitor for infectious etiology although white count is within normal limits at 9  Monitor respiratory status with oxygen saturations  Obtain echocardiogram to assess current LV status as well as valve disease  -Diuresis-Bumex 1 mg twice daily, DuoNebs scheduled, may benefit from Mucomyst  -BMP every other day  -Strict intake and output  -Daily weight  -Heart failure nurse consult     History of atrial fibrillation on oral anticoagulation  -Subtherapeutic INR  -Rate control  -Pharmacy consult for dosing    1/21/2024  Looks and sounds much 
    [facility]   Progress note      Subjective:    The patient is awake and alert.    Patient resting comfortably in no acute distress  He looks markedly improved today  Breath sounds better  Remains hard of hearing  Objective:    /62   Pulse 75   Temp 97.8 °F (36.6 °C) (Axillary)   Resp 18   Ht 1.778 m (5' 10\")   Wt 57.1 kg (125 lb 12.8 oz)   SpO2 94%   BMI 18.05 kg/m²     In: 10 [I.V.:10]  Out: -   In: 10   Out: -     General appearance: NAD, hard of hearing unable to understand anything I am saying  HEENT: AT/NC, MMM  Neck: FROM, supple  Lungs: Clear to auscultation  CV: RRR, no MRGs  Vasc: Radial pulses 2+  Abdomen: Soft, non-tender; no masses or HSM  Extremities: No peripheral edema or digital cyanosis  Skin: no rash, lesions or ulcers       Recent Labs     01/19/24  1425 01/20/24  0253   WBC 8.9 9.1   HGB 9.5* 9.4*   HCT 29.3* 29.3*    231       Recent Labs     01/19/24  1425 01/20/24  0253 01/21/24  0401    137 138   K 4.8 4.2 3.8    101 100   CO2 25 25 27   BUN 17 18 27*   CREATININE 0.8 0.9 1.1   CALCIUM 8.9 8.7 8.6       Assessment:    Principal Problem:    Bilateral pleural effusion  Active Problems:    Severe protein-calorie malnutrition (HCC)  Resolved Problems:    * No resolved hospital problems. *      Plan:    Patient is an 89-year-old male recently admitted for pneumonia and discharged to facility and is now being admitted to the intermediate unit with:     Bilateral pleural effusions from volume overload  Continue to monitor for infectious etiology although white count is within normal limits at 9  Monitor respiratory status with oxygen saturations  Obtain echocardiogram to assess current LV status as well as valve disease  -Diuresis-Bumex 1 mg twice daily, DuoNebs scheduled, may benefit from Mucomyst  -BMP every other day  -Strict intake and output  -Daily weight  -Heart failure nurse consult     History of atrial fibrillation on oral 
    [facility]   Progress note      Subjective:    Resting comfortably in bed  No family at bedside     Objective:    BP (!) 107/55   Pulse 70   Temp 98.4 °F (36.9 °C) (Oral)   Resp 20   Ht 1.778 m (5' 10\")   Wt 60.3 kg (132 lb 15 oz)   SpO2 93%   BMI 19.07 kg/m²     In: -   Out: 500   In: -   Out: 500 [Urine:500]    General appearance: NAD, hard of hearing unable to understand anything I am saying  HEENT: AT/NC, MMM  Neck: FROM, supple  Lungs: Markedly improved breath sounds, no longer gurgling or coarse  CV: RRR, no MRGs  Vasc: Radial pulses 2+  Abdomen: Soft, non-tender; no masses or HSM  Extremities: No peripheral edema or digital cyanosis  Skin: no rash, lesions or ulcers       Recent Labs     01/23/24  0640 01/24/24  0627   WBC 9.6 8.9   HGB 9.1* 8.7*   HCT 28.3* 27.4*    186         Recent Labs     01/23/24  0640 01/24/24  0627    140   K 3.6 3.6    101   CO2 25 24   BUN 40* 46*   CREATININE 1.4* 1.4*   CALCIUM 8.3* 8.2*         Assessment:    Principal Problem:    Bilateral pleural effusion  Active Problems:    Severe protein-calorie malnutrition (HCC)  Resolved Problems:    * No resolved hospital problems. *      Plan:    Patient is an 89-year-old male recently admitted for pneumonia and discharged to facility and is now being admitted to the intermediate unit with:     Bilateral pleural effusions from volume overload  Continue to monitor for infectious etiology although white count is within normal limits at 9  Monitor respiratory status with oxygen saturations  Obtain echocardiogram to assess current LV status as well as valve disease  -Diuresis-Bumex 1 mg twice daily, DuoNebs scheduled, may benefit from Mucomyst  -BMP every other day  -Strict intake and output  -Daily weight  -Heart failure nurse consult     History of atrial fibrillation on oral anticoagulation  -Subtherapeutic INR  -Rate control  -Pharmacy consult for dosing    1/21/2024  Looks and sounds much 
  [facility] Quality Flow/Interdisciplinary Rounds Progress Note        Quality Flow Rounds held on January 25, 2024    Disciplines Attending:  Bedside Nurse, , , and Nursing Unit Leadership    Juan Sotelo was admitted on 1/19/2024 12:48 PM    Anticipated Discharge Date:  Expected Discharge Date: 01/25/24    Disposition:    Tutu Score:  Tutu Scale Score: 15    Readmission Risk              Risk of Unplanned Readmission:  26           Discussed patient goal for the day, patient clinical progression, and barriers to discharge.  The following Goal(s) of the Day/Commitment(s) have been identified:   check discharge need precert.       Christina Vitale RN  January 25, 2024        
  HOSPITALIST  RAPID RESPONSE (RR) NOTE    1/26/2024 10:56 AM    Name: Juan Sotelo            Reason for RR: hypoxemia with encephalopathy  Subjective:      89-year-old male with a past medical history of AAA, CAD CHF hyperlipidemia hypertension status post CABG x 2, atrial fibrillation and pacemaker in situ. He presents to the ED with shortness of breath from home.  He along with his daughter at the bedside states he was just recently discharged from the hospital with pneumonia to a facility and just returned home 2 days ago.  Home health nurse advised patient to come into the ED for evaluation after auscultating a lot of crackles in the lung fields.  ED lab work negative for any electrolyte abnormalities on metabolic panel.  proBNP greater than 6000, troponin 47 and 41 on repeat CBC negative for leukocytosis with a white count of 8.9.  Patient does take Coumadin at home however he has a subtherapeutic INR 1.4.  Respiratory panel negative for COVID and influenza.  Chest x-ray showed cardiomegaly with pulmonary vascular congestion bilateral pleural effusions slightly greater on the right.  Patient was given 20 mg of IV Lasix, and remained on room air with saturations greater than 92%.  Patient will be admitted for further evaluation and treatment.    Patient here for Acute Decompensated Heart Failure with bilateral effusions. Most recent Echo showed EF of 50% with indeterminate diastolic function. Upon entering the room patient was on NRB nursing reports patient desat'd to 60% on 2L. Difficult to get an accurate pulse ox reading despite good pulses. Patient is awake and conversant. Other vital signs are stable. He recently received Lasix at 10pm.          Objective:  BP (!) 124/59   Pulse 64   Temp 98.4 °F (36.9 °C) (Oral)   Resp 20   Ht 1.778 m (5' 10\")   Wt 60.3 kg (132 lb 15 oz)   SpO2 100%   BMI 19.07 kg/m²     Intake/Output Summary (Last 24 hours) at 1/26/2024 1056  Last data filed at 1/26/2024 
  [facility] Quality Flow/Interdisciplinary Rounds Progress Note        Quality Flow Rounds held on January 26, 2024    Disciplines Attending:  Bedside Nurse, , , and Nursing Unit Leadership    Juan Sotelo was admitted on 1/19/2024 12:48 PM    Anticipated Discharge Date:  Expected Discharge Date: 01/25/24    Disposition:    Tutu Score:  Tutu Scale Score: 14    Readmission Risk              Risk of Unplanned Readmission:  23           Discussed patient goal for the day, patient clinical progression, and barriers to discharge.  The following Goal(s) of the Day/Commitment(s) have been identified:   Check discharge.      DIONICIO VENEGAS RN  January 26, 2024        
  [facility] Quality Flow/Interdisciplinary Rounds Progress Note        Quality Flow Rounds held on January 24, 2024    Disciplines Attending:  Bedside Nurse, , , and Nursing Unit Leadership    Juan Sotelo was admitted on 1/19/2024 12:48 PM    Anticipated Discharge Date:       Disposition:    Tutu Score:  Tutu Scale Score: 13    Readmission Risk              Risk of Unplanned Readmission:  25           Discussed patient goal for the day, patient clinical progression, and barriers to discharge.  The following Goal(s) of the Day/Commitment(s) have been identified:   await precert, lovenox coumadin bridge.       Christina Vitale RN  January 24, 2024        
  [facility] Quality Flow/Interdisciplinary Rounds Progress Note        Quality Flow Rounds held on January 23, 2024    Disciplines Attending:  Bedside Nurse, , , and Nursing Unit Leadership    Juan Sotelo was admitted on 1/19/2024 12:48 PM    Anticipated Discharge Date:       Disposition:    Tutu Score:  Tutu Scale Score: 17    Readmission Risk              Risk of Unplanned Readmission:  23           Discussed patient goal for the day, patient clinical progression, and barriers to discharge.  The following Goal(s) of the Day/Commitment(s) have been identified:   need precert, monitor labs, coumadin lovenox bridge.      Christina Vitale RN  January 23, 2024        
  P Quality Flow/Interdisciplinary Rounds Progress Note        Quality Flow Rounds held on January 20, 2024    Disciplines Attending:  Bedside Nurse and Nursing Unit Leadership    Juan Sotelo was admitted on 1/19/2024 12:48 PM    Anticipated Discharge Date:       Disposition:    Tutu Score:  Tutu Scale Score: 20    Readmission Risk              Risk of Unplanned Readmission:  18           Discussed patient goal for the day, patient clinical progression, and barriers to discharge.  The following Goal(s) of the Day/Commitment(s) have been identified:   IV bumex      Lia Whaley RN  January 20, 2024        
  P Quality Flow/Interdisciplinary Rounds Progress Note        Quality Flow Rounds held on January 21, 2024    Disciplines Attending:  Bedside Nurse and Nursing Unit Leadership    Juan Sotelo was admitted on 1/19/2024 12:48 PM    Anticipated Discharge Date:       Disposition:    Tutu Score:  Tutu Scale Score: 20    Readmission Risk              Risk of Unplanned Readmission:  21           Discussed patient goal for the day, patient clinical progression, and barriers to discharge.  The following Goal(s) of the Day/Commitment(s) have been identified:   iv bumex,  discharge planning      Lia Whaley RN  January 21, 2024        
  P Quality Flow/Interdisciplinary Rounds Progress Note        Quality Flow Rounds held on January 28, 2024    Disciplines Attending:  Bedside Nurse    Juan JAE Deepak was admitted on 1/19/2024 12:48 PM    Anticipated Discharge Date:  Expected Discharge Date: 01/25/24    Disposition:    Tutu Score:  Tutu Scale Score: 13    Readmission Risk              Risk of Unplanned Readmission:  25           Discussed patient goal for the day, patient clinical progression, and barriers to discharge.  The following Goal(s) of the Day/Commitment(s) have been identified:   Await SLP Brandon Evans RN  January 28, 2024        
  Physician Progress Note      PATIENT:               SYDNIE GRAFF  [facility] #:                  [id number]  :                       10/6/1934  ADMIT DATE:       2024 12:48 PM  DISCH DATE:  RESPONDING  PROVIDER #:        CHAD PERSAUD          QUERY TEXT:    Pt admitted with volume overload. Pt noted to have history of CHF. If   possible, please document in progress notes and discharge summary if you are   evaluating and/or treating any of the following:    The medical record reflects the following:  Risk Factors: HTN, afib  Clinical Indicators: BNP [id number]. CXR \"1.Cardiomegaly with pulmonary   vascular congestion. 2.Bilateral pleural effusions slightly larger on the   right. 3.There has been slight overall interval progression.\" Given IV Lasix   20mg in ED.  H&P \"Bilateral pleural effusions from volume overload. Obtain echocardiogram.   Diuresis-Bumex 1 mg twice daily.\"  Treatment: echo, Bumex IV    Thank you,  Dayanna Ruelas RN, CCDS  Clinical Documentation   [email or web address]  [phone]  (M-F 6am-2:30 pm)  Options provided:  -- Acute on Chronic Diastolic CHF/HFpEF  -- Acute on Chronic Systolic CHF/HFrEF  -- Acute on Chronic Systolic and Diastolic CHF  -- Acute Systolic CHF/HFrEF  -- Acute Diastolic CHF/HFpEF  -- Acute Systolic and Diastolic CHF  -- Chronic Systolic CHF/HFrEF  -- Chronic Diastolic CHF/HFpEF  -- Chronic Systolic and Diastolic CHF  -- Other - I will add my own diagnosis  -- Disagree - Not applicable / Not valid  -- Disagree - Clinically unable to determine / Unknown  -- Refer to Clinical Documentation Reviewer    PROVIDER RESPONSE TEXT:    This patient is in systolic CHF/HFrEF exacerbation.    Query created by: Dayanna Ruelas on 2024 9:07 AM      QUERY TEXT:    Pt admitted with fluid overload. Pt noted to have increase in creatinine.   Creatinine 0.9 on admission increasing to 1.7. If possible, please document in   the progress notes and discharge summary if 
  Speech Language Pathology  NAME:  Juan Sotelo  :  10/6/1934  DATE: 2024  ROOM:  [address] [place] [address]-A    Pt unavailable at 1345 and again at 1410 for Clinical Swallow Evaluation services due to:    With other medical staff ; patient getting breathing treatment and then IV team in room    Will re-attempt as appropriate. Thank you.       
  [facility] Quality Flow/Interdisciplinary Rounds Progress Note        Quality Flow Rounds held on January 22, 2024    Disciplines Attending:  Bedside Nurse, , , and Nursing Unit Leadership    Juan Sotelo was admitted on 1/19/2024 12:48 PM    Anticipated Discharge Date:       Disposition:    Tutu Score:  Tutu Scale Score: 13    Readmission Risk              Risk of Unplanned Readmission:  23           Discussed patient goal for the day, patient clinical progression, and barriers to discharge.  The following Goal(s) of the Day/Commitment(s) have been identified:   iv bumex, lovenox, coumadin bridge, await precert once SNF determined.      Christina Vitale RN  January 22, 2024        
  [facility] Quality Flow/Interdisciplinary Rounds Progress Note        Quality Flow Rounds held on January 29, 2024    Disciplines Attending:  Bedside Nurse, , , and Nursing Unit Leadership    Juan Sotelo was admitted on 1/19/2024 12:48 PM    Anticipated Discharge Date:  Expected Discharge Date: 01/25/24    Disposition:    Tutu Score:  Tutu Scale Score: 15    Readmission Risk              Risk of Unplanned Readmission:  25           Discussed patient goal for the day, patient clinical progression, and barriers to discharge.  The following Goal(s) of the Day/Commitment(s) have been identified:   check d/c      Christina Vitale RN  January 29, 2024        
-Consulted to dose vancomycin for sepsis of unknown etiology.   -Patient received vancomycin 1250 mg IV x 1 on 1/26 (1307).  -Standing order for vancomycin 750 mg IV q24h was placed.  -Vancomycin random level after this dose was below 20 mcg/mL, thus will continue vancomycin 750 mg IV q24h at this time.  -Serum creatinine has increased from 1.2 mg/dL yesterday to 1.4 mg/dL today.  -If serum creatinine continues to increase, will likely order another vancomycin level prior to steady state.  -Will continue to monitor renal function and vancomycin levels when appropriate.   
4 Eyes Skin Assessment     NAME:  Juan Sotelo  YOB: 1934  MEDICAL RECORD NUMBER:  [id number]    The patient is being assessed for  Admission    I agree that at least one RN has performed a thorough Head to Toe Skin Assessment on the patient. ALL assessment sites listed below have been assessed.      Areas assessed by both nurses:    Head, Face, Ears, Shoulders, Back, Chest, Arms, Elbows, Hands, Sacrum. Buttock, Coccyx, Ischium, Legs. Feet and Heels, Under Medical Devices , and Other          Does the Patient have a Wound? Yes wound(s) were present on assessment. LDA wound assessment was Initiated and completed by RN       Tutu Prevention initiated by RN: Yes  Wound Care Orders initiated by RN: Yes    Pressure Injury (Stage 3,4, Unstageable, DTI, NWPT, and Complex wounds) if present, place Wound referral order by RN under : Yes    New Ostomies, if present place, Ostomy referral order under : No     Nurse 1 eSignature: Electronically signed by Geno Mcqueen RN on 1/19/24 at 6:26 PM EST    **SHARE this note so that the co-signing nurse can place an eSignature**    Nurse 2 eSignature: Electronically signed by Lupe Catherine RN on 1/19/24 at 6:30 PM EST    
Call placed  to April NP re: Coumadin order.  
Call placed to Jama ann covering for Dr. Reyes to notify of morning BP and for parameters on meds. Per jama ann, will place orders.   
Comprehensive Nutrition Assessment    Type and Reason for Visit:  Reassess    Nutrition Recommendations/Plan:   Recommend consider EN support, if PO diet is not medically feasible in 48-72 hr, as pt meets criteria for severe malnutrition  If PO can be resumed, will resume ONS with all meals  Continue inpatient monitoring     Malnutrition Assessment:  Malnutrition Status:  Severe malnutrition (01/20/24 1301)    Context:  Chronic Illness     Findings of the 6 clinical characteristics of malnutrition:  Energy Intake:  75% or less estimated energy requirements for 1 month or longer  Weight Loss:  Unable to assess (d/t CHF)     Body Fat Loss:  Severe body fat loss Orbital   Muscle Mass Loss:  Severe muscle mass loss Temples (temporalis), Clavicles (pectoralis & deltoids)  Fluid Accumulation:  No significant fluid accumulation     Strength:  Not Performed    Nutrition Assessment:    Pt s/p RRT 1/26 d/t decreased resp status, febrile. Current +blood cultures x 1. 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 Discharge to [facility] was planned for 1/26 but on hold now. If adequate PO is not medically feasible, recommend consider EN support in the setting of increased needs in pt meeting criteria for severe malnutrition.  Will continue to monitor.    Nutrition Related Findings:    A&Ox4, very [place], edentulous, soft abd +BS, no edema, on NIV, -I/O 7.7 Wound Type: Pressure Injury, Stage II, Skin Tears (per wound care consult)       Current Nutrition Intake & Therapies:    Average Meal Intake: NPO  Average Supplements Intake: NPO  Diet NPO    Anthropometric Measures:  Height: 177.8 cm (5' 10\")  Ideal Body Weight (IBW): 166 lbs (75 kg)    Admission Body Weight: 57.1 kg (125 lb 12.8 oz) (1/20 bed)  Current Body Weight: 61.9 kg (136 lb 7.4 oz) (gradual wt gain over inpt stay - likely fluid), 75.8 % IBW. Weight Source: Bed Scale (1/27)  Current BMI (kg/m2): 19.6  Usual Body Weight: 60.2 kg (132 lb 13 oz) (3/2023 actual per EMR)  % Weight Change 
Consult requested for wound care - perfect serve notification also completed  
D[facility] requesting to speak with Dr. Quiles prior to patient discharge. Dr. Quiles notified through Hello Chair.   
Date: 1/27/2024    Time: 11:11 PM    Patient Placed On BIPAP/CPAP/ Non-Invasive Ventilation?  Yes    If no must comment.  Facial area red/color change? No           If YES are Blister/Lesion present?No   If yes must notify nursing staff  BIPAP/CPAP skin barrier?  Yes    Skin barrier type:mepilexlite       Comments: Machine plugged into red outlet and outside alarm plugged in.        Catalina Hall RIN    01/27/24 9060   NIV Type   NIV Started/Stopped On   Mode Bilevel   Mask Type Full face mask   Mask Size Medium   Assessment   Respirations 24   Comfort Level Good   Using Accessory Muscles No   Mask Compliance Good   Skin Assessment Clean, dry, & intact   Skin Protection for O2 Device Yes   Settings/Measurements   PIP Observed 14 cm H20   IPAP 14 cmH20   CPAP/EPAP 6 cmH2O   Vt (Measured) 602 mL   Rate Ordered 14   FiO2  45 %   Minute Volume (L/min) 16.2 Liters   Mask Leak (lpm) 23 lpm       
Eliceo served NP Heriberto regarding pts BP. Informed her that Pts BP low and has medication PRN but is NPO. Okay to remain on strict NPO and retake BP close to midnight. Will report back if needed.   
K 3.1. secure message sent to Dr. Quiles. PRN says not to use it for pt with CRCl less than 30.  
Message sent to Dr Quiles to check for d/c   
Nurse to nurse report given to Aliza valentin [facility].   
Occupational Therapy  OT BEDSIDE TREATMENT NOTE      Date:2024  Patient Name: Juan Sotelo  MRN: [id number]  : 10/6/1934  Room: [address] [place] [address]      Evaluating OT: Lizzy Buchanan OTR/L   [id number]       Referring Provider:Heriberto N - CNP    Specific Provider Orders/Date:OT eval and treat        Diagnosis:  Bilateral pleural effusion [J90]     Pertinent Medical History: CHF, pacemaker      Precautions:  Fall Risk, [place]      Assessment of current deficits    [x] Functional mobility            [x]ADLs           [x] Strength                  []Cognition    [x] Functional transfers          [x] IADLs         [x] Safety Awareness   [x]Endurance    [] Fine Coordination                         [x] Balance      [] Vision/perception   []Sensation      []Gross Motor Coordination             [] ROM           [] Delirium                   [] Motor Control      OT PLAN OF CARE   OT POC based on physician orders, patient diagnosis and results of clinical assessment     Frequency/Duration  2-4 days/wk for 2 weeks PRN   Specific OT Treatment Interventions to include:   ADL retraining/adapted techniques and AE recommendations to increase functional independence within precautions                    Energy conservation techniques to improve tolerance for selfcare routine   Functional transfer/mobility training/DME recommendations for increased independence, safety and fall prevention         Patient/family education to increase safety and functional independence             Environmental modifications for safe mobility and completion of ADLs                             Therapeutic activity to improve functional performance during ADLs.                                         Therapeutic exercise to improve tolerance and functional strength for ADLs    Balance retraining/tolerance tasks for facilitation of postural control with dynamic challenges during ADLs .      Positioning to improve functional 
Occupational Therapy  Patient treatment attempted this AM.  Patient currently involved in RRT.    Will attempt at a later time.  Tamika SIDHU/GUS [id number]    
Patient wants to wear bipap in the morning.  
Pharmacy Consultation Note  (Antibiotic Dosing and Monitoring)        Note vancomycin discontinued. Clinical pharmacy will sign-off. Please re-consult if we can be of further assistance.    Thanks for this consult,  Debra Friedman RPH, PharmD, BCPS  1/29/2024  1:35 PM     
Pharmacy Consultation Note  (Antibiotic Dosing and Monitoring)    Initial consult date: 1/26/24  Consulting physician/provider: Dr. Hernandez  Drug: Vancomycin  Indication: Sepsis of unknown etiology    Age/  Gender Height Weight IBW  Allergy Information   89 y.o./male 177.8 cm (5' 10\") 61.2 kg (135 lb)     Ideal body weight: 73 kg (160 lb 15 oz)   Codeine      Renal Function:  Recent Labs     01/26/24  1106 01/27/24  0120 01/27/24  1405 01/28/24  0225   BUN 44*  --  52* 49*   CREATININE 1.2 1.4* 1.4* 1.5*     No intake or output data in the 24 hours ending 01/28/24 0927    Vancomycin Monitoring:  Trough:  No results for input(s): \"VANCOTROUGH\" in the last 72 hours.  Random:    Recent Labs     01/27/24  0120   VANCORANDOM 12.8       Vancomycin Administration Times:  Recent vancomycin administrations                     vancomycin (VANCOCIN) 750 mg in sodium chloride 0.9 % 250 mL IVPB (mg) 750 mg New Bag 01/28/24 0849     750 mg New Bag 01/27/24 0816    vancomycin (VANCOCIN) 1,250 mg in sodium chloride 0.9 % 250 mL IVPB (mg) 1,250 mg New Bag 01/26/24 1357                    Assessment:  Patient is a 89 y.o. male who has been initiated on vancomycin  Estimated Creatinine Clearance: 29 mL/min (A) (based on SCr of 1.5 mg/dL (H)).  To dose vancomycin, pharmacy will be utilizing Infectious calculation software for goal AUC/ELISE 400-600 mg/L-hr (predicted AUC/ELISE = 524, Tr =16.1 mcg/mL)    Plan:  Will continue vancomycin 750 mg IV every 24 hours  Will check vancomycin levels when appropriate  Will continue to monitor renal function   Pharmacy to follow      Belen Perez, PharmD, BCPS, BCCCP 1/28/2024 9:28 AM      
Pharmacy Consultation Note  (Warfarin Dosing and Monitoring)    Initial consult date: 1/21/24  Consulting Provider: Rayne Louis     Juan Sotelo is a 89 y.o. male for whom pharmacy has been asked to manage warfarin therapy.     Weight:   Wt Readings from Last 1 Encounters:   01/20/24 57.1 kg (125 lb 12.8 oz)       TSH:    Lab Results   Component Value Date/Time    TSH 4.390 05/02/2014 11:50 AM       Hepatic Function Panel:                            Lab Results   Component Value Date/Time    ALKPHOS 76 01/19/2024 02:25 PM    ALT 17 01/19/2024 02:25 PM    AST 20 01/19/2024 02:25 PM    PROT 7.0 01/19/2024 02:25 PM    BILITOT 1.5 01/19/2024 02:25 PM    BILIDIR 0.5 12/12/2023 04:20 AM    LABALBU 3.6 01/19/2024 02:25 PM       Current warfarin drug-drug interactions include: cholestyramine (decr INR)    Recent Labs     01/19/24  1425 01/20/24  0253   HGB 9.5* 9.4*    231     Date Warfarin Dose INR Heparin or LMWH Comment   1/21 2 mg 1.7 Lovenox 1 mg/kg BID Lovenox Bridge                                 Assessment:  Patient is a 89 y.o. male on warfarin for Atrial Fibrillation.  Patient's home warfarin dosing regimen is 2 mg Daily.   Goal INR 2 - 3  INR 1.7 today    Plan:  Warfarin 2mg tonight  Daily PT/INR until the INR is stable within the therapeutic range  Pharmacist will follow and monitor/adjust dosing as necessary    Thank you for this consult,    Puma Burgos [facility] 1/21/2024 7:41 PM    VILMA: [phone]  SEY: [phone]  SJW: [phone]     
Pharmacy Consultation Note  (Warfarin Dosing and Monitoring)    Initial consult date: 1/21/24  Consulting Provider: Rayne Louis     Juan Sotelo is a 89 y.o. male for whom pharmacy has been asked to manage warfarin therapy.     Weight:   Wt Readings from Last 1 Encounters:   01/22/24 59 kg (130 lb)       TSH:    Lab Results   Component Value Date/Time    TSH 4.390 05/02/2014 11:50 AM       Hepatic Function Panel:                            Lab Results   Component Value Date/Time    ALKPHOS 76 01/19/2024 02:25 PM    ALT 17 01/19/2024 02:25 PM    AST 20 01/19/2024 02:25 PM    PROT 7.0 01/19/2024 02:25 PM    BILITOT 1.5 01/19/2024 02:25 PM    BILIDIR 0.5 12/12/2023 04:20 AM    LABALBU 3.6 01/19/2024 02:25 PM       Current warfarin drug-drug interactions include: cholestyramine (decr INR)    Recent Labs     01/19/24  1425 01/20/24  0253 01/22/24  0427   HGB 9.5* 9.4* 9.8*    231 221       Date Warfarin Dose INR Heparin or LMWH Comment   1/21 2 mg 1.7 Lovenox 1 mg/kg BID Lovenox Bridge   1/22 2 mg 1.7 Lovenox 1 mg/kg BID                           Assessment:  Patient is a 89 y.o. male on warfarin for Atrial Fibrillation.  Patient's home warfarin dosing regimen is 2 mg Daily.   Goal INR 2 - 3, CHADS2-vasc: 4  INR 1.7 today    Plan:  Warfarin 2mg tonight with lovenox bridge  Daily PT/INR until the INR is stable within the therapeutic range  Pharmacist will follow and monitor/adjust dosing as necessary    Thank you for this consult,    Ricci Ybarra, PharmD Candidate 2024  Debra Friedman, FabioD, BCPS 1/22/2024 2:27 PM   Ext: [phone]     VILMA: [phone]  SEY: [phone]  SJW: [phone]     
Pharmacy Consultation Note  (Warfarin Dosing and Monitoring)    Initial consult date: 1/21/24  Consulting Provider: Rayne Louis     Juan Sotelo is a 89 y.o. male for whom pharmacy has been asked to manage warfarin therapy.     Weight:   Wt Readings from Last 1 Encounters:   01/23/24 58.9 kg (129 lb 13.6 oz)       TSH:    Lab Results   Component Value Date/Time    TSH 4.390 05/02/2014 11:50 AM       Hepatic Function Panel:                            Lab Results   Component Value Date/Time    ALKPHOS 76 01/19/2024 02:25 PM    ALT 17 01/19/2024 02:25 PM    AST 20 01/19/2024 02:25 PM    PROT 7.0 01/19/2024 02:25 PM    BILITOT 1.5 01/19/2024 02:25 PM    BILIDIR 0.5 12/12/2023 04:20 AM    LABALBU 3.6 01/19/2024 02:25 PM       Current warfarin drug-drug interactions include: cholestyramine (decr INR)    Recent Labs     01/22/24  0427 01/23/24  0640   HGB 9.8* 9.1*    216       Date Warfarin Dose INR Heparin or LMWH Comment   1/21 2 mg 1.7 Lovenox 1 mg/kg BID Lovenox Bridge   1/22 2 mg 1.7 Lovenox 1 mg/kg BID    1/23 2 mg 2.3 --                    Assessment:  Patient is a 89 y.o. male on warfarin for Atrial Fibrillation.  Patient's home warfarin dosing regimen is 2 mg Daily.   Goal INR 2 - 3, CHADS2-vasc: 4  INR 2.3 today    Plan:  Warfarin 2mg tonight- INR therapeutic, D/C lovenox bridge  Daily PT/INR until the INR is stable within the therapeutic range  Pharmacist will follow and monitor/adjust dosing as necessary    Thank you for this consult,    Ricci Ybarra, PharmD Candidate 2024  Debra Friedman, FabioD, BCPS 1/23/2024 10:35 AM   Ext: [phone]     VILMA: [phone]  SEY: [phone]  SJW: [phone]     
Pharmacy Consultation Note  (Warfarin Dosing and Monitoring)    Initial consult date: 1/21/24  Consulting Provider: Rayne Louis     Juan Sotelo is a 89 y.o. male for whom pharmacy has been asked to manage warfarin therapy.     Weight:   Wt Readings from Last 1 Encounters:   01/24/24 59.3 kg (130 lb 11.7 oz)       TSH:    Lab Results   Component Value Date/Time    TSH 4.390 05/02/2014 11:50 AM       Hepatic Function Panel:                            Lab Results   Component Value Date/Time    ALKPHOS 76 01/19/2024 02:25 PM    ALT 17 01/19/2024 02:25 PM    AST 20 01/19/2024 02:25 PM    PROT 7.0 01/19/2024 02:25 PM    BILITOT 1.5 01/19/2024 02:25 PM    BILIDIR 0.5 12/12/2023 04:20 AM    LABALBU 3.6 01/19/2024 02:25 PM       Current warfarin drug-drug interactions include: cholestyramine (decr INR)    Recent Labs     01/22/24  0427 01/23/24  0640 01/24/24  0627   HGB 9.8* 9.1* 8.7*    216 186       Date Warfarin Dose INR Heparin or LMWH Comment   1/21 2 mg 1.7 Lovenox 1 mg/kg BID Lovenox Bridge   1/22 2 mg 1.7 Lovenox 1 mg/kg BID    1/23 2 mg 2.3 --    1/24 1 mg 2.9 --             Assessment:  Patient is a 89 y.o. male on warfarin for Atrial Fibrillation and Tissue Heart Valve.  Patient's home warfarin dosing regimen is 2 mg Daily.   Goal INR 2 - 3, CHADS2-vasc: 4  INR 2.9 today    Plan:  Warfarin 1mg tonight  Daily PT/INR until the INR is stable within the therapeutic range  Pharmacist will follow and monitor/adjust dosing as necessary    Thank you for this consult,    Ricci Ybarra, PharmD Candidate 2024  Debra Friedman, FabioD, BCPS 1/24/2024 2:17 PM   Ext: [phone]     VILMA: [phone]  SEY: [phone]  SJW: [phone]     
Pharmacy Consultation Note  (Warfarin Dosing and Monitoring)    Initial consult date: 1/21/24  Consulting Provider: Rayne Louis     Juan Sotelo is a 89 y.o. male for whom pharmacy has been asked to manage warfarin therapy.     Weight:   Wt Readings from Last 1 Encounters:   01/25/24 60.3 kg (132 lb 15 oz)       TSH:    Lab Results   Component Value Date/Time    TSH 4.390 05/02/2014 11:50 AM       Hepatic Function Panel:                            Lab Results   Component Value Date/Time    ALKPHOS 76 01/19/2024 02:25 PM    ALT 17 01/19/2024 02:25 PM    AST 20 01/19/2024 02:25 PM    PROT 7.0 01/19/2024 02:25 PM    BILITOT 1.5 01/19/2024 02:25 PM    BILIDIR 0.5 12/12/2023 04:20 AM    LABALBU 3.6 01/19/2024 02:25 PM       Current warfarin drug-drug interactions include: cholestyramine (decr INR)    Recent Labs     01/23/24  0640 01/24/24  0627   HGB 9.1* 8.7*    186       Date Warfarin Dose INR Heparin or LMWH Comment   1/21 2 mg 1.7 Lovenox 1 mg/kg BID Lovenox Bridge   1/22 2 mg 1.7 Lovenox 1 mg/kg BID    1/23 2 mg 2.3 --    1/24 1 mg 2.9 --    1/25 HOLD 3.4 --      Assessment:  Patient is a 89 y.o. male on warfarin for Atrial Fibrillation and Tissue Heart Valve.  Patient's home warfarin dosing regimen is 2 mg Daily.   Goal INR 2 - 3, CHADS2-vasc: 4  INR 3.4 today    Plan:  Warfarin ON HOLD tonight  Daily PT/INR until the INR is stable within the therapeutic range  Pharmacist will follow and monitor/adjust dosing as necessary    Thank you for this consult,    Ricci Ybarra, PharmD Candidate 2024  Debra Friedman, FabioD, BCPS 1/25/2024 12:33 PM   Ext: [phone]     VILMA: [phone]  SEY: [phone]  SJW: [phone]     
Pharmacy Consultation Note  (Warfarin Dosing and Monitoring)    Initial consult date: 1/21/24  Consulting Provider: Rayne Louis     Juan Sotelo is a 89 y.o. male for whom pharmacy has been asked to manage warfarin therapy.     Weight:   Wt Readings from Last 1 Encounters:   01/25/24 60.3 kg (132 lb 15 oz)       TSH:    Lab Results   Component Value Date/Time    TSH 4.390 05/02/2014 11:50 AM       Hepatic Function Panel:                            Lab Results   Component Value Date/Time    ALKPHOS 76 01/19/2024 02:25 PM    ALT 17 01/19/2024 02:25 PM    AST 20 01/19/2024 02:25 PM    PROT 7.0 01/19/2024 02:25 PM    BILITOT 1.5 01/19/2024 02:25 PM    BILIDIR 0.5 12/12/2023 04:20 AM    LABALBU 3.6 01/19/2024 02:25 PM       Current warfarin drug-drug interactions include: cholestyramine (decr INR)    Recent Labs     01/24/24  0627   HGB 8.7*          Date Warfarin Dose INR Heparin or LMWH Comment   1/21 2 mg 1.7 Lovenox 1 mg/kg BID Lovenox Bridge   1/22 2 mg 1.7 Lovenox 1 mg/kg BID    1/23 2 mg 2.3 --    1/24 1 mg 2.9 --    1/25 HOLD 3.4 --    1/26 HOLD 3.5 --      Assessment:  Patient is a 89 y.o. male on warfarin for Atrial Fibrillation and Tissue Heart Valve.  Patient's home warfarin dosing regimen is 2 mg Daily.   Goal INR 2 - 3, CHADS2-vasc: 4  INR 3.5 today    Plan:  Warfarin ON HOLD tonight  Daily PT/INR until the INR is stable within the therapeutic range  Pharmacist will follow and monitor/adjust dosing as necessary    Thank you for this consult,    Debra Friedman, PharmD, BCPS 1/26/2024 8:52 AM   Ext: [phone]     VILMA: [phone]  SEY: [phone]  SJW: [phone]     
Pharmacy Consultation Note  (Warfarin Dosing and Monitoring)    Initial consult date: 1/21/24  Consulting Provider: Rayne Louis     Juan Sotelo is a 89 y.o. male for whom pharmacy has been asked to manage warfarin therapy.     Weight:   Wt Readings from Last 1 Encounters:   01/27/24 61.9 kg (136 lb 7.4 oz)       TSH:    Lab Results   Component Value Date/Time    TSH 4.390 05/02/2014 11:50 AM       Hepatic Function Panel:                            Lab Results   Component Value Date/Time    ALKPHOS 51 01/29/2024 07:28 AM    ALT 10 01/29/2024 07:28 AM    AST 19 01/29/2024 07:28 AM    PROT 6.2 01/29/2024 07:28 AM    BILITOT 1.0 01/29/2024 07:28 AM    BILIDIR 0.5 12/12/2023 04:20 AM    LABALBU 3.2 01/29/2024 07:28 AM       Current warfarin drug-drug interactions include:   -Cholestyramine: may decrease serum concentration of warfarin. It is currently spaced 3 hours after when warfarin would be given once resumed.  -Pravastatin: may enhance the anticoagulatory effect of warfarin.     Recent Labs     01/27/24  1405 01/28/24  0225 01/29/24  0728   HGB 8.2* 7.8* 7.4*    137 128*       Date Warfarin Dose INR Heparin or LMWH Comment   1/21 2 mg 1.7 Lovenox 1 mg/kg BID Lovenox Bridge   1/22 2 mg 1.7 Lovenox 1 mg/kg BID    1/23 2 mg 2.3 --    1/24 1 mg 2.9 --    1/25 HOLD 3.4 --    1/26 HOLD 3.5 --    1/27 NO DOSE 3.6 X    1/28 2 mg 2.6 X    1/29 HOLD 3.9 --             Assessment:  Patient is a 89 y.o. male on warfarin for Atrial Fibrillation and Tissue Heart Valve.  Patient's home warfarin dosing regimen is 2 mg Daily.   Goal INR 2 - 3, CHADS2-vasc: 4  INR 3.9 today    Plan:  HOLD warfarin tonight  Daily PT/INR until the INR is stable within the therapeutic range  Pharmacist will follow and monitor/adjust dosing as necessary    Ricci Ybarra, PharmD Candidate 2024  Debra Friedman, PharmD, BCPS 1/29/2024 1:35 PM   Ext: [phone]       
Pharmacy Consultation Note  (Warfarin Dosing and Monitoring)    Initial consult date: 1/21/24  Consulting Provider: Rayne Louis     Juan Sotelo is a 89 y.o. male for whom pharmacy has been asked to manage warfarin therapy.     Weight:   Wt Readings from Last 1 Encounters:   01/27/24 61.9 kg (136 lb 7.4 oz)       TSH:    Lab Results   Component Value Date/Time    TSH 4.390 05/02/2014 11:50 AM       Hepatic Function Panel:                            Lab Results   Component Value Date/Time    ALKPHOS 77 01/26/2024 11:06 AM    ALT 13 01/26/2024 11:06 AM    AST 18 01/26/2024 11:06 AM    PROT 7.2 01/26/2024 11:06 AM    BILITOT 1.7 01/26/2024 11:06 AM    BILIDIR 0.5 12/12/2023 04:20 AM    LABALBU 3.6 01/26/2024 11:06 AM       Current warfarin drug-drug interactions include:   -Cholestyramine: may decrease serum concentration of warfarin. It is currently spaced 3 hours after when warfarin would be given once resumed.  -Pravastatin: may enhance the anticoagulatory effect of warfarin.     Recent Labs     01/26/24  1106   HGB 9.1*          Date Warfarin Dose INR Heparin or LMWH Comment   1/21 2 mg 1.7 Lovenox 1 mg/kg BID Lovenox Bridge   1/22 2 mg 1.7 Lovenox 1 mg/kg BID    1/23 2 mg 2.3 --    1/24 1 mg 2.9 --    1/25 HOLD 3.4 --    1/26 HOLD 3.5 --    1/27 NO DOSE 3.6 X                    Assessment:  Patient is a 89 y.o. male on warfarin for Atrial Fibrillation and Tissue Heart Valve.  Patient's home warfarin dosing regimen is 2 mg Daily.   Goal INR 2 - 3, CHADS2-vasc: 4  INR 3.6 today    Plan:  No warfarin tonight.   Daily PT/INR until the INR is stable within the therapeutic range  Pharmacist will follow and monitor/adjust dosing as necessary    Tito Osullivan RPh    VILMA: [phone]  SEY: [phone]  SJW: [phone]     
Pharmacy Consultation Note  (Warfarin Dosing and Monitoring)    Initial consult date: 1/21/24  Consulting Provider: Rayne oLuis     Juan Sotelo is a 89 y.o. male for whom pharmacy has been asked to manage warfarin therapy.     Weight:   Wt Readings from Last 1 Encounters:   01/27/24 61.9 kg (136 lb 7.4 oz)       TSH:    Lab Results   Component Value Date/Time    TSH 4.390 05/02/2014 11:50 AM       Hepatic Function Panel:                            Lab Results   Component Value Date/Time    ALKPHOS 53 01/28/2024 02:25 AM    ALT 12 01/28/2024 02:25 AM    AST 16 01/28/2024 02:25 AM    PROT 6.3 01/28/2024 02:25 AM    BILITOT 1.0 01/28/2024 02:25 AM    BILIDIR 0.5 12/12/2023 04:20 AM    LABALBU 3.0 01/28/2024 02:25 AM       Current warfarin drug-drug interactions include:   -Cholestyramine: may decrease serum concentration of warfarin. It is currently spaced 3 hours after when warfarin would be given once resumed.  -Pravastatin: may enhance the anticoagulatory effect of warfarin.     Recent Labs     01/26/24  1106 01/27/24  1405 01/28/24  0225   HGB 9.1* 8.2* 7.8*    141 137       Date Warfarin Dose INR Heparin or LMWH Comment   1/21 2 mg 1.7 Lovenox 1 mg/kg BID Lovenox Bridge   1/22 2 mg 1.7 Lovenox 1 mg/kg BID    1/23 2 mg 2.3 --    1/24 1 mg 2.9 --    1/25 HOLD 3.4 --    1/26 HOLD 3.5 --    1/27 NO DOSE 3.6 X    1/28 2 mg 2.6 X             Assessment:  Patient is a 89 y.o. male on warfarin for Atrial Fibrillation and Tissue Heart Valve.  Patient's home warfarin dosing regimen is 2 mg Daily.   Goal INR 2 - 3, CHADS2-vasc: 4  INR 2.6 today    Plan:  Warfarin 2mg x 1 tonight   Daily PT/INR until the INR is stable within the therapeutic range  Pharmacist will follow and monitor/adjust dosing as necessary    Belen Perez, PharmD, BCPS, BCCCP 1/28/2024 9:25 AM    
Physical Therapy  Facility/Department: [address] INTERMEDIATE  Physical Therapy Treatment Note    Name: Juan Sotelo  : 10/6/1934  MRN: [id number]  Date of Service: 2024          Patient Diagnosis(es): The encounter diagnosis was Acute on chronic congestive heart failure, unspecified heart failure type (HCC).  Past Medical History:  has a past medical history of AAA (abdominal aortic aneurysm) (HCC), Arrhythmia, Arthritis, CAD (coronary artery disease), CHF (congestive heart failure) (HCC), History of tobacco use, Hyperlipidemia with target low density lipoprotein (LDL) cholesterol less than 70 mg/dL, Hypertension, Jaundice, persistent, S/P CABG x 2, S/P MVR (mitral valve replacement), S/P TVR (tricuspid valve repair), Swelling, Tiredness, and Weight loss.  Past Surgical History:  has a past surgical history that includes Pilonidal cyst excision (); Elbow surgery (); Pacemaker insertion (); Cardiac surgery (); AAA repair, endovascular (2015); Coronary artery bypass graft; Cardiac defibrillator placement (2016); Diagnostic Cardiac Cath Lab Procedure; Cardiac pacemaker placement; Cardiac defibrillator placement (Left, 2022); and Cataract removal with implant (Bilateral).                 Evaluating Therapist: Ashley Balnco PT     Referring Provider:      Rayne Louis, APRN - CNP       PT order : PT eval and treat         Room #: 407  DIAGNOSIS: The encounter diagnosis was Acute on chronic congestive heart failure, unspecified heart failure type (HCC).     PRECAUTIONS: falls, [place] - new on set of inability to hear      Social:  Pt lives with wife  in a  1  floor plan  2  steps and  1  rails to enter.  Prior to admission pt walked with  cane. Has ww   Recently discharged from SNF       Initial Evaluation  Date:  2024 Treatment    2024  Short Term/ Long Term   Goals   Was pt agreeable to Eval/treatment?  Yes  yes      Does pt have pain?  None reported   no 
Pt is NPO, speech has not seen the ptJenn Gill to administer PO meds. Notified dr. Quiles via secure message.  
Secure message sent to ENT re: new consult      Dr. De Souza aware of ENT consult, states pt can follow up with him outpt when he is discharged as he will not see the pt inpatient for the specified consult reason. April NP notified.   
Secure text message sent to Dr. Quiles to notify of 1 set positive Blood Cultures.    
Silent prayer offered while medical team was assisting patient, family member declined  presence at this time.  
Voice Message left for Speech Pathologist re: nicko.  
Voice message left for Avril Kaminski re: Home ear drops. Awaiting call back/ new orders.   
complaints     Bed Mobility  Rolling:  Nt   Supine to sit:  min assist   Sit to supine:  NT   Scooting:  SBA in sit  Supine to sit: Min A  Scooting: Min A seated to EOB Independent    Transfers Sit to stand:  min assist   Stand to sit: min assist   Stand pivot:  min assist  Sit <> stand: Min A SBA    Ambulation     a few steps with ww min assist  15 + 35 feet with WW Min A  50- 100  feet with  ww  with  SBA         Stair negotiation: ascended and descended NT  NT  2-4  steps with  1  rail with  CGA    LE ROM  WFL       LE strength  4- / 5    4+/ 5    AM- PAC RAW score   16/ 24 16/24       Pt is alert, oriented, [place], communication by writing  Balance: poor dynamic with WW    Pt performed therapeutic exercise of the following: NT    Patient education/treatment  Pt was educated on UE usage for hand placement    Patient response to education:   Pt verbalized understanding Pt demonstrated skill Pt requires further education in this area   yes With instruction yes     ASSESSMENT:   Comments: Nurse ok with Rx. Son Daniel present and with good encouragement. Much time devoted to communication. Pt sat EOB SBA for balance. Gait slow, inconsistent and laboring. Pt unsteady throughout, required constant hands on assist for balance/safety. Pt stood Min A with use of rail for support while receiving hygiene care. Pt unsafe to gait/transfer alone presently, is a fall risk without support. 02 saturation 94% on room air after activity, Pt appeared slightly short of breath.    Pt was left upright in a recliner chair with call light in reach, waffle cushion in place    Chair/bed alarm: chair placed and active    Time in 0943   Time out 1008   Total Treatment Time 25 minutes   CPT codes:     Therapeutic activities 41636 25 minutes   Therapeutic exercises 65299 0 minutes       Pt is making fair progress toward established Physical Therapy goals.  Continue with physical therapy current plan of care.    Juan Manuel Soto PTA   License Number: 
numbness or tingling   Tone: WFL   Edema: none observed     Comments: Upon arrival patient lying in bed .  At end of session, patient  sitting in chair  with call light and phone within reach, all lines and tubes intact.  *ALARM ON, son present in room     Overall patient demonstrated  decreased independence and safety during completion of ADL/functional transfer/mobility tasks.  Pt would benefit from continued skilled OT to increase safety and independence with completion of ADL/IADL tasks for functional independence and quality of life.      Comments/Treatment:      Patient practiced and was instructed on following during evaluation and OT session:          -Bed mobility - technique and safety         -Tranfers - hand placement, tech and safety         -Mobility - safety, and tech with  a device         -ADLs - tech, AE if appropriate/needed  - continue to encourage patient to participate and complete ADL activity         -Education:  , importance of OOB activity and participating in ADLs, safety awareness             Rehab Potential: good for established goals     Patient / Family Goal: rehab       Patient and/or family were instructed on functional diagnosis, prognosis/goals and OT plan of care. Demonstrated good  understanding by son      Eval Complexity: Low    Time In: 1000  Time Out: 1025  Total Treatment Time: 10    Min Units   OT Eval Low 97165 x  1   OT Eval Medium 84070      OT Eval High 96143      OT Re-Eval 70891       Therapeutic Ex 52054      Therapeutic Activities 92331       ADL/Self Care 65908  10  1   Orthotic Management 00028       Manual 33260     Neuro Re-Ed 08554       Non-Billable Time      OT Re eval 74728        Evaluation Time additionally includes thorough review of current medical information, gathering information on past medical history/social history and prior level of function, interpretation of standardized testing/informal observation of tasks, assessment of data and development 
providing counseling/education to patient/family, and formulating plan.       PARRIS Roa - CNP  2:57 PM  1/25/2024   
understand(s) diagnosis, prognosis, and plan of care. -  yes      PLAN    PT care will be provided in accordance with the objectives noted above.  Whenever appropriate, clear delegation orders will be provided for nursing staff.  Exercises and functional mobility practice will be used as well as appropriate assistive devices or modalities to obtain goals. Patient and family education will also be administered as needed.           PLAN OF CARE:     Current Treatment Recommendations      [x] Strengthening to improve independence with functional mobility   [] ROM to improve independence with functional mobility   [x] Balance Training to improve static/dynamic balance and to reduce fall risk  [x] Endurance Training to improve activity tolerance during functional mobility   [x] Transfer Training to improve safety and independence with all functional transfers   [x] Gait Training to improve gait mechanics, endurance and assess need for appropriate assistive device  [x] Stair Training in preparation for safe discharge home and/or into the community   [x] Positioning to prevent skin breakdown and contractures  [x] Safety and Education Training   [x] Patient/Caregiver Education   [] HEP  [] Other      Frequency of treatments will be 2-5x/week x 7-10 days.     Time in:  1141    Time out:  1205         Evaluation Time includes thorough review of current medical information, gathering information on past medical history/social history and prior level of function, completion of standardized testing/informal observation of tasks, assessment of data and education on plan of care and goals.     CPT codes:  [x] Low Complexity PT evaluation 00852  [] Moderate Complexity PT evaluation 48068  [] High Complexity PT evaluation 60538  [] PT Re-evaluation 32065  [] Gait training 89003  minutes  [x] Therapeutic activities 48329 8  minutes  [] Therapeutic exercises 56003  minutes  [] Neuromuscular reeducation 53988  minutes         Ashley 
CAD (coronary artery disease)    Hypertension    S/P CABG x 2    S/P MVR (mitral valve replacement)    S/P TVR (tricuspid valve repair)    Chronic systolic heart failure (HCC)    Pacemaker-dependent due to native cardiac rhythm insufficient to support life    ICD (implantable cardioverter-defibrillator), biventricular, in situ    Abnormal LFTs    Atelectasis    Bradycardia    Elevated bilirubin    Hyperlipidemia with target low density lipoprotein (LDL) cholesterol less than 70 mg/dL    Leukocytosis    Pleural effusion, left    Postoperative pain    Stress hyperglycemia    Volume overload    Weight loss, unintentional    End of battery life of cardiac resynchronization therapy defibrillator (CRT-D)    LEYDI (acute kidney injury) (HCC)    Colitis    Clostridium difficile colitis    Supratherapeutic INR    Bilateral pleural effusion         CPT code:  48010  bedside swallow eval    INTERVENTION  Speech Pathologist (SLP) completed education with the patient/family regarding type of swallowing impairment. Reviewed current solid/liquid consistency diet recommendations and discussed compensatory strategies to ensure safe PO intake. Reviewed aspiration precautions. Encouraged patient and/or family to engage SLP in unstructured Q&A session relative to identified deficit areas; indicated understanding of all information provided via satisfactory verbal response.    CPT Code: 53195  dysphagia tx       
a total time of 25 minutes of this patient encounter reviewing chart, labs, radiological reports coordinating care with interdisciplinary teams, face to face encounter with patient, providing counseling/education to patient/family, and formulating plan.       Nohemy Quiles DO  10:56 AM  1/28/2024

## 2024-01-29 NOTE — CARE COORDINATION
Dc order in place, CM arranged WC transportation to [facility] via [facility] for 3 pm w/nursing, facility and dtr [place] notified.   Deonna Gaming, KRISTINAN, RN  [facility] Case Management  [phone]

## 2024-01-29 NOTE — CARE COORDINATION
Social Work / Discharge Planning : Patient will be discharegd to [facility] at 2:30 via [facility]

## 2024-01-29 NOTE — DISCHARGE SUMMARY
[facility]   Discharge summary   Patient ID:  Juan Sotelo  [id number]  89 y.o.  10/6/1934    Admit date: 1/19/2024    Discharge date and time: 1/29/2024    Admission Diagnoses:   Patient Active Problem List   Diagnosis    Essential hypertension, benign    Severe mitral regurgitation    Atrial fibrillation (HCC)    Severe protein-calorie malnutrition (HCC)    AAA (abdominal aortic aneurysm) (HCC)    CAD (coronary artery disease)    Hypertension    S/P CABG x 2    S/P MVR (mitral valve replacement)    S/P TVR (tricuspid valve repair)    Chronic systolic heart failure (HCC)    Pacemaker-dependent due to native cardiac rhythm insufficient to support life    ICD (implantable cardioverter-defibrillator), biventricular, in situ    Abnormal LFTs    Atelectasis    Bradycardia    Elevated bilirubin    Hyperlipidemia with target low density lipoprotein (LDL) cholesterol less than 70 mg/dL    Leukocytosis    Pleural effusion, left    Postoperative pain    Stress hyperglycemia    Volume overload    Weight loss, unintentional    End of battery life of cardiac resynchronization therapy defibrillator (CRT-D)    LEYDI (acute kidney injury) (HCC)    Colitis    Clostridium difficile colitis    Supratherapeutic INR    Bilateral pleural effusion       Discharge Diagnoses: ***    Consults: {consultation:42171}    Procedures: ***    Hospital Course: The patient is a 89 y.o. male of Ronni Fried MD with significant past medical history of *** who presents with ***    Recent Labs     01/27/24  1405 01/28/24  0225 01/29/24  0728   WBC 10.6 9.1 7.3   HGB 8.2* 7.8* 7.4*   HCT 26.4* 24.8* 23.4*    137 128*       Recent Labs     01/27/24  1405 01/28/24  0225 01/29/24  0728    144 145   K 3.2* 3.1* 3.3*    110* 112*   CO2 22 22 22   BUN 52* 49* 35*   CREATININE 1.4* 1.5* 1.3*   CALCIUM 9.0 8.6 8.9       XR CHEST (2 VW)    Result Date: 1/19/2024  EXAMINATION: TWO XRAY VIEWS OF THE CHEST 1/19/2024 2:33 pm

## 2024-02-01 LAB
MICROORGANISM SPEC CULT: NORMAL
MICROORGANISM SPEC CULT: NORMAL
SERVICE CMNT-IMP: NORMAL
SERVICE CMNT-IMP: NORMAL
SPECIMEN DESCRIPTION: NORMAL
SPECIMEN DESCRIPTION: NORMAL

## 2024-02-09 ENCOUNTER — APPOINTMENT (OUTPATIENT)
Dept: GENERAL RADIOLOGY | Age: 89
DRG: 871 | End: 2024-02-09
Payer: MEDICARE

## 2024-02-09 ENCOUNTER — HOSPITAL ENCOUNTER (INPATIENT)
Age: 89
LOS: 1 days | DRG: 871 | End: 2024-02-10
Attending: EMERGENCY MEDICINE | Admitting: INTERNAL MEDICINE
Payer: MEDICARE

## 2024-02-09 ENCOUNTER — APPOINTMENT (OUTPATIENT)
Dept: CT IMAGING | Age: 89
DRG: 871 | End: 2024-02-09
Payer: MEDICARE

## 2024-02-09 VITALS
WEIGHT: 134.26 LBS | HEIGHT: 70 IN | SYSTOLIC BLOOD PRESSURE: 96 MMHG | DIASTOLIC BLOOD PRESSURE: 58 MMHG | TEMPERATURE: 97 F | BODY MASS INDEX: 19.22 KG/M2 | HEART RATE: 70 BPM | RESPIRATION RATE: 25 BRPM | OXYGEN SATURATION: 100 %

## 2024-02-09 DIAGNOSIS — J18.0 ACUTE BRONCHOPNEUMONIA: ICD-10-CM

## 2024-02-09 DIAGNOSIS — E87.20 LACTIC ACIDOSIS: ICD-10-CM

## 2024-02-09 DIAGNOSIS — R65.21 SEPTIC SHOCK (HCC): ICD-10-CM

## 2024-02-09 DIAGNOSIS — A41.9 SEPTIC SHOCK (HCC): ICD-10-CM

## 2024-02-09 DIAGNOSIS — C79.51 CANCER, METASTATIC TO BONE (HCC): ICD-10-CM

## 2024-02-09 DIAGNOSIS — R41.82 ALTERED MENTAL STATUS, UNSPECIFIED ALTERED MENTAL STATUS TYPE: ICD-10-CM

## 2024-02-09 DIAGNOSIS — I50.22 CHRONIC SYSTOLIC HEART FAILURE (HCC): ICD-10-CM

## 2024-02-09 DIAGNOSIS — J96.01 ACUTE RESPIRATORY FAILURE WITH HYPOXIA (HCC): Primary | ICD-10-CM

## 2024-02-09 LAB
ABO/RH: NORMAL
ALBUMIN SERPL-MCNC: 3 G/DL (ref 3.5–5.2)
ALP SERPL-CCNC: 87 U/L (ref 40–129)
ALT SERPL-CCNC: 10 U/L (ref 0–40)
AMORPH SED URNS QL MICRO: PRESENT
ANION GAP SERPL CALCULATED.3IONS-SCNC: 13 MMOL/L (ref 7–16)
ANION GAP SERPL CALCULATED.3IONS-SCNC: 15 MMOL/L (ref 7–16)
ANTIBODY SCREEN: NEGATIVE
ARM BAND NUMBER: NORMAL
AST SERPL-CCNC: 26 U/L (ref 0–39)
B PARAP IS1001 DNA NPH QL NAA+NON-PROBE: NOT DETECTED
B PERT DNA SPEC QL NAA+PROBE: NOT DETECTED
B.E.: -8.2 MMOL/L (ref -3–3)
B.E.: -8.8 MMOL/L (ref -3–3)
B.E.: -8.9 MMOL/L (ref -3–3)
B.E.: -9.3 MMOL/L (ref -3–3)
BACTERIA URNS QL MICRO: ABNORMAL
BASOPHILS # BLD: 0 K/UL (ref 0–0.2)
BASOPHILS # BLD: 0.08 K/UL (ref 0–0.2)
BASOPHILS NFR BLD: 0 % (ref 0–2)
BASOPHILS NFR BLD: 0 % (ref 0–2)
BILIRUB SERPL-MCNC: 1.2 MG/DL (ref 0–1.2)
BILIRUB UR QL STRIP: NEGATIVE
BLOOD BANK DISPENSE STATUS: NORMAL
BLOOD BANK SAMPLE EXPIRATION: NORMAL
BNP SERPL-MCNC: 6863 PG/ML (ref 0–450)
BPU ID: NORMAL
BUN SERPL-MCNC: 28 MG/DL (ref 6–23)
BUN SERPL-MCNC: 30 MG/DL (ref 6–23)
C PNEUM DNA NPH QL NAA+NON-PROBE: NOT DETECTED
CALCIUM SERPL-MCNC: 8.3 MG/DL (ref 8.6–10.2)
CALCIUM SERPL-MCNC: 8.5 MG/DL (ref 8.6–10.2)
CHLORIDE SERPL-SCNC: 109 MMOL/L (ref 98–107)
CHLORIDE SERPL-SCNC: 110 MMOL/L (ref 98–107)
CLARITY UR: CLEAR
CO2 SERPL-SCNC: 18 MMOL/L (ref 22–29)
CO2 SERPL-SCNC: 19 MMOL/L (ref 22–29)
COHB: 1.5 % (ref 0–1.5)
COHB: 1.6 % (ref 0–1.5)
COHB: 1.7 % (ref 0–1.5)
COHB: 2.1 % (ref 0–1.5)
COLOR UR: YELLOW
COMMENT: ABNORMAL
COMPONENT: NORMAL
CORTIS SERPL-MCNC: NORMAL UG/DL (ref 2.7–18.4)
CREAT SERPL-MCNC: 1.4 MG/DL (ref 0.7–1.2)
CREAT SERPL-MCNC: 1.5 MG/DL (ref 0.7–1.2)
CRITICAL: ABNORMAL
CROSSMATCH RESULT: NORMAL
DATE ANALYZED: ABNORMAL
DATE OF COLLECTION: ABNORMAL
EKG ATRIAL RATE: 110 BPM
EKG Q-T INTERVAL: 452 MS
EKG QRS DURATION: 144 MS
EKG QTC CALCULATION (BAZETT): 488 MS
EKG R AXIS: 168 DEGREES
EKG T AXIS: 77 DEGREES
EKG VENTRICULAR RATE: 70 BPM
EOSINOPHIL # BLD: 0 K/UL (ref 0.05–0.5)
EOSINOPHIL # BLD: 0 K/UL (ref 0.05–0.5)
EOSINOPHILS RELATIVE PERCENT: 0 % (ref 0–6)
EOSINOPHILS RELATIVE PERCENT: 0 % (ref 0–6)
EPI CELLS #/AREA URNS HPF: ABNORMAL /HPF
ERYTHROCYTE [DISTWIDTH] IN BLOOD BY AUTOMATED COUNT: 17.1 % (ref 11.5–15)
ERYTHROCYTE [DISTWIDTH] IN BLOOD BY AUTOMATED COUNT: 17.3 % (ref 11.5–15)
ERYTHROCYTE [SEDIMENTATION RATE] IN BLOOD BY WESTERGREN METHOD: 75 MM/HR (ref 0–15)
FIO2: 40 %
FIO2: 50 %
FIO2: 50 %
FLUAV RNA NPH QL NAA+NON-PROBE: NOT DETECTED
FLUBV RNA NPH QL NAA+NON-PROBE: NOT DETECTED
GFR SERPL CREATININE-BSD FRML MDRD: 45 ML/MIN/1.73M2
GFR SERPL CREATININE-BSD FRML MDRD: 48 ML/MIN/1.73M2
GLUCOSE BLD-MCNC: 94 MG/DL (ref 74–99)
GLUCOSE SERPL-MCNC: 112 MG/DL (ref 74–99)
GLUCOSE SERPL-MCNC: 95 MG/DL (ref 74–99)
GLUCOSE UR STRIP-MCNC: NEGATIVE MG/DL
HADV DNA NPH QL NAA+NON-PROBE: NOT DETECTED
HCO3: 16.6 MMOL/L (ref 22–26)
HCO3: 17.1 MMOL/L (ref 22–26)
HCO3: 18.9 MMOL/L (ref 22–26)
HCO3: 19 MMOL/L (ref 22–26)
HCOV 229E RNA NPH QL NAA+NON-PROBE: NOT DETECTED
HCOV HKU1 RNA NPH QL NAA+NON-PROBE: NOT DETECTED
HCOV NL63 RNA NPH QL NAA+NON-PROBE: NOT DETECTED
HCOV OC43 RNA NPH QL NAA+NON-PROBE: NOT DETECTED
HCT VFR BLD AUTO: 21 % (ref 37–54)
HCT VFR BLD AUTO: 24.3 % (ref 37–54)
HGB BLD-MCNC: 6.4 G/DL (ref 12.5–16.5)
HGB BLD-MCNC: 7.5 G/DL (ref 12.5–16.5)
HGB UR QL STRIP.AUTO: NEGATIVE
HHB: 1.2 % (ref 0–5)
HHB: 1.3 % (ref 0–5)
HHB: 3.4 % (ref 0–5)
HHB: 5.3 % (ref 0–5)
HMPV RNA NPH QL NAA+NON-PROBE: NOT DETECTED
HPIV1 RNA NPH QL NAA+NON-PROBE: NOT DETECTED
HPIV2 RNA NPH QL NAA+NON-PROBE: NOT DETECTED
HPIV3 RNA NPH QL NAA+NON-PROBE: NOT DETECTED
HPIV4 RNA NPH QL NAA+NON-PROBE: NOT DETECTED
IMM GRANULOCYTES # BLD AUTO: 0.82 K/UL (ref 0–0.58)
IMM GRANULOCYTES NFR BLD: 2 % (ref 0–5)
INFLUENZA A BY PCR: NOT DETECTED
INFLUENZA B BY PCR: NOT DETECTED
INR PPP: 3.1
KETONES UR STRIP-MCNC: NEGATIVE MG/DL
LAB: ABNORMAL
LACTATE BLDV-SCNC: 3.2 MMOL/L (ref 0.5–1.9)
LACTATE BLDV-SCNC: 3.6 MMOL/L (ref 0.5–2.2)
LACTATE BLDV-SCNC: 3.9 MMOL/L (ref 0.5–1.9)
LEUKOCYTE ESTERASE UR QL STRIP: ABNORMAL
LYMPHOCYTES NFR BLD: 0 K/UL (ref 1.5–4)
LYMPHOCYTES NFR BLD: 0.56 K/UL (ref 1.5–4)
LYMPHOCYTES RELATIVE PERCENT: 0 % (ref 20–42)
LYMPHOCYTES RELATIVE PERCENT: 2 % (ref 20–42)
Lab: 1448
Lab: 1735
Lab: 642
Lab: 810
M PNEUMO DNA NPH QL NAA+NON-PROBE: NOT DETECTED
MAGNESIUM SERPL-MCNC: 1.7 MG/DL (ref 1.6–2.6)
MAGNESIUM SERPL-MCNC: 1.8 MG/DL (ref 1.6–2.6)
MCH RBC QN AUTO: 32 PG (ref 26–35)
MCH RBC QN AUTO: 33.2 PG (ref 26–35)
MCHC RBC AUTO-ENTMCNC: 30.5 G/DL (ref 32–34.5)
MCHC RBC AUTO-ENTMCNC: 30.9 G/DL (ref 32–34.5)
MCV RBC AUTO: 105 FL (ref 80–99.9)
MCV RBC AUTO: 107.5 FL (ref 80–99.9)
METHB: 0.3 % (ref 0–1.5)
MODE: ABNORMAL
MONOCYTES NFR BLD: 0.69 K/UL (ref 0.1–0.95)
MONOCYTES NFR BLD: 1.18 K/UL (ref 0.1–0.95)
MONOCYTES NFR BLD: 3 % (ref 2–12)
MONOCYTES NFR BLD: 3 % (ref 2–12)
MYELOCYTES ABSOLUTE COUNT: 1.16 K/UL
MYELOCYTES: 4 %
NEUTROPHILS NFR BLD: 93 % (ref 43–80)
NEUTROPHILS NFR BLD: 93 % (ref 43–80)
NEUTS SEG NFR BLD: 24.75 K/UL (ref 1.8–7.3)
NEUTS SEG NFR BLD: 32.95 K/UL (ref 1.8–7.3)
NITRITE UR QL STRIP: NEGATIVE
O2 CONTENT: 11.1 ML/DL
O2 CONTENT: 8.1 ML/DL
O2 CONTENT: 9.3 ML/DL
O2 CONTENT: 9.7 ML/DL
O2 SATURATION: 94.6 % (ref 92–98.5)
O2 SATURATION: 96.5 % (ref 92–98.5)
O2 SATURATION: 98.7 % (ref 92–98.5)
O2 SATURATION: 98.8 % (ref 92–98.5)
O2HB: 92.8 % (ref 94–97)
O2HB: 94.8 % (ref 94–97)
O2HB: 96.3 % (ref 94–97)
O2HB: 96.8 % (ref 94–97)
OPERATOR ID: 1741
OPERATOR ID: 1741
OPERATOR ID: 3114
OPERATOR ID: 46
OSMOLALITY SERPL: 308 MOSM/KG (ref 285–310)
PATIENT TEMP: 37 C
PCO2: 33.7 MMHG (ref 35–45)
PCO2: 37.4 MMHG (ref 35–45)
PCO2: 46.2 MMHG (ref 35–45)
PCO2: 56.7 MMHG (ref 35–45)
PEEP/CPAP: 5 CMH2O
PEEP/CPAP: 6 CMH2O
PFO2: 1.78 MMHG/%
PFO2: 2.85 MMHG/%
PFO2: 3.33 MMHG/%
PH BLOOD GAS: 7.14 (ref 7.35–7.45)
PH BLOOD GAS: 7.23 (ref 7.35–7.45)
PH BLOOD GAS: 7.28 (ref 7.35–7.45)
PH BLOOD GAS: 7.31 (ref 7.35–7.45)
PH UR STRIP: 5.5 [PH] (ref 5–9)
PHOSPHATE SERPL-MCNC: 4.2 MG/DL (ref 2.5–4.5)
PLATELET # BLD AUTO: 164 K/UL (ref 130–450)
PLATELET CONFIRMATION: NORMAL
PLATELET, FLUORESCENCE: 199 K/UL (ref 130–450)
PMV BLD AUTO: 10.2 FL (ref 7–12)
PMV BLD AUTO: 11.2 FL (ref 7–12)
PO2: 102.7 MMHG (ref 75–100)
PO2: 133.2 MMHG (ref 75–100)
PO2: 142.3 MMHG (ref 75–100)
PO2: 89.2 MMHG (ref 75–100)
POTASSIUM SERPL-SCNC: 4.9 MMOL/L (ref 3.5–5)
POTASSIUM SERPL-SCNC: 5.1 MMOL/L (ref 3.5–5)
PROT SERPL-MCNC: 6.3 G/DL (ref 6.4–8.3)
PROT UR STRIP-MCNC: 30 MG/DL
PROTHROMBIN TIME: 34.3 SEC (ref 9.3–12.4)
PS: 12 CMH20
RBC # BLD AUTO: 2 M/UL (ref 3.8–5.8)
RBC # BLD AUTO: 2.26 M/UL (ref 3.8–5.8)
RBC # BLD: ABNORMAL 10*6/UL
RBC #/AREA URNS HPF: ABNORMAL /HPF
RI(T): 0.85
RI(T): 1.21
RI(T): 2.28
RR MECHANICAL: 20 B/MIN
RSV RNA NPH QL NAA+NON-PROBE: NOT DETECTED
RV+EV RNA NPH QL NAA+NON-PROBE: NOT DETECTED
SARS-COV-2 RDRP RESP QL NAA+PROBE: NOT DETECTED
SARS-COV-2 RNA NPH QL NAA+NON-PROBE: NOT DETECTED
SODIUM SERPL-SCNC: 141 MMOL/L (ref 132–146)
SODIUM SERPL-SCNC: 143 MMOL/L (ref 132–146)
SOURCE, BLOOD GAS: ABNORMAL
SP GR UR STRIP: 1.02 (ref 1–1.03)
SPECIMEN DESCRIPTION: NORMAL
SPECIMEN DESCRIPTION: NORMAL
T4 FREE SERPL-MCNC: 0.8 NG/DL (ref 0.9–1.7)
THB: 5.7 G/DL (ref 11.5–16.5)
THB: 6.9 G/DL (ref 11.5–16.5)
THB: 7 G/DL (ref 11.5–16.5)
THB: 8.2 G/DL (ref 11.5–16.5)
TIME ANALYZED: 1456
TIME ANALYZED: 1740
TIME ANALYZED: 648
TIME ANALYZED: 828
TRANSFUSION STATUS: NORMAL
TROPONIN I SERPL HS-MCNC: 73 NG/L (ref 0–11)
TROPONIN I SERPL HS-MCNC: 73 NG/L (ref 0–11)
TSH SERPL DL<=0.05 MIU/L-ACNC: 14.4 UIU/ML (ref 0.27–4.2)
UNIT DIVISION: 0
UROBILINOGEN UR STRIP-ACNC: 0.2 EU/DL (ref 0–1)
VT MECHANICAL: 465 ML
WBC #/AREA URNS HPF: ABNORMAL /HPF
WBC OTHER # BLD: 26.6 K/UL (ref 4.5–11.5)
WBC OTHER # BLD: 35.6 K/UL (ref 4.5–11.5)

## 2024-02-09 PROCEDURE — 99291 CRITICAL CARE FIRST HOUR: CPT

## 2024-02-09 PROCEDURE — 51702 INSERT TEMP BLADDER CATH: CPT

## 2024-02-09 PROCEDURE — 06HM33Z INSERTION OF INFUSION DEVICE INTO RIGHT FEMORAL VEIN, PERCUTANEOUS APPROACH: ICD-10-PCS | Performed by: EMERGENCY MEDICINE

## 2024-02-09 PROCEDURE — 36600 WITHDRAWAL OF ARTERIAL BLOOD: CPT

## 2024-02-09 PROCEDURE — 36556 INSERT NON-TUNNEL CV CATH: CPT

## 2024-02-09 PROCEDURE — 86900 BLOOD TYPING SEROLOGIC ABO: CPT

## 2024-02-09 PROCEDURE — 81001 URINALYSIS AUTO W/SCOPE: CPT

## 2024-02-09 PROCEDURE — 83735 ASSAY OF MAGNESIUM: CPT

## 2024-02-09 PROCEDURE — 87086 URINE CULTURE/COLONY COUNT: CPT

## 2024-02-09 PROCEDURE — 84100 ASSAY OF PHOSPHORUS: CPT

## 2024-02-09 PROCEDURE — 85610 PROTHROMBIN TIME: CPT

## 2024-02-09 PROCEDURE — 86901 BLOOD TYPING SEROLOGIC RH(D): CPT

## 2024-02-09 PROCEDURE — 86403 PARTICLE AGGLUT ANTBDY SCRN: CPT

## 2024-02-09 PROCEDURE — 82533 TOTAL CORTISOL: CPT

## 2024-02-09 PROCEDURE — 85025 COMPLETE CBC W/AUTO DIFF WBC: CPT

## 2024-02-09 PROCEDURE — 74177 CT ABD & PELVIS W/CONTRAST: CPT

## 2024-02-09 PROCEDURE — 2500000003 HC RX 250 WO HCPCS

## 2024-02-09 PROCEDURE — 0202U NFCT DS 22 TRGT SARS-COV-2: CPT

## 2024-02-09 PROCEDURE — 86923 COMPATIBILITY TEST ELECTRIC: CPT

## 2024-02-09 PROCEDURE — 6360000004 HC RX CONTRAST MEDICATION: Performed by: RADIOLOGY

## 2024-02-09 PROCEDURE — 82805 BLOOD GASES W/O2 SATURATION: CPT

## 2024-02-09 PROCEDURE — 2580000003 HC RX 258: Performed by: NURSE PRACTITIONER

## 2024-02-09 PROCEDURE — 96361 HYDRATE IV INFUSION ADD-ON: CPT

## 2024-02-09 PROCEDURE — 2700000000 HC OXYGEN THERAPY PER DAY

## 2024-02-09 PROCEDURE — 96368 THER/DIAG CONCURRENT INF: CPT

## 2024-02-09 PROCEDURE — 82962 GLUCOSE BLOOD TEST: CPT

## 2024-02-09 PROCEDURE — 83605 ASSAY OF LACTIC ACID: CPT

## 2024-02-09 PROCEDURE — 71045 X-RAY EXAM CHEST 1 VIEW: CPT

## 2024-02-09 PROCEDURE — 87635 SARS-COV-2 COVID-19 AMP PRB: CPT

## 2024-02-09 PROCEDURE — 84484 ASSAY OF TROPONIN QUANT: CPT

## 2024-02-09 PROCEDURE — 84443 ASSAY THYROID STIM HORMONE: CPT

## 2024-02-09 PROCEDURE — 96365 THER/PROPH/DIAG IV INF INIT: CPT

## 2024-02-09 PROCEDURE — 86140 C-REACTIVE PROTEIN: CPT

## 2024-02-09 PROCEDURE — 6360000002 HC RX W HCPCS: Performed by: NURSE PRACTITIONER

## 2024-02-09 PROCEDURE — 6370000000 HC RX 637 (ALT 250 FOR IP): Performed by: EMERGENCY MEDICINE

## 2024-02-09 PROCEDURE — 84145 PROCALCITONIN (PCT): CPT

## 2024-02-09 PROCEDURE — 2580000003 HC RX 258

## 2024-02-09 PROCEDURE — 6360000002 HC RX W HCPCS

## 2024-02-09 PROCEDURE — 86850 RBC ANTIBODY SCREEN: CPT

## 2024-02-09 PROCEDURE — 94640 AIRWAY INHALATION TREATMENT: CPT

## 2024-02-09 PROCEDURE — 80053 COMPREHEN METABOLIC PANEL: CPT

## 2024-02-09 PROCEDURE — 87081 CULTURE SCREEN ONLY: CPT

## 2024-02-09 PROCEDURE — P9047 ALBUMIN (HUMAN), 25%, 50ML: HCPCS

## 2024-02-09 PROCEDURE — 87077 CULTURE AEROBIC IDENTIFY: CPT

## 2024-02-09 PROCEDURE — 2580000003 HC RX 258: Performed by: EMERGENCY MEDICINE

## 2024-02-09 PROCEDURE — 99221 1ST HOSP IP/OBS SF/LOW 40: CPT | Performed by: NURSE PRACTITIONER

## 2024-02-09 PROCEDURE — 96360 HYDRATION IV INFUSION INIT: CPT

## 2024-02-09 PROCEDURE — 93010 ELECTROCARDIOGRAM REPORT: CPT | Performed by: INTERNAL MEDICINE

## 2024-02-09 PROCEDURE — 2000000000 HC ICU R&B

## 2024-02-09 PROCEDURE — 6360000002 HC RX W HCPCS: Performed by: EMERGENCY MEDICINE

## 2024-02-09 PROCEDURE — 96366 THER/PROPH/DIAG IV INF ADDON: CPT

## 2024-02-09 PROCEDURE — 87502 INFLUENZA DNA AMP PROBE: CPT

## 2024-02-09 PROCEDURE — 2500000003 HC RX 250 WO HCPCS: Performed by: EMERGENCY MEDICINE

## 2024-02-09 PROCEDURE — 94660 CPAP INITIATION&MGMT: CPT

## 2024-02-09 PROCEDURE — 85652 RBC SED RATE AUTOMATED: CPT

## 2024-02-09 PROCEDURE — 83880 ASSAY OF NATRIURETIC PEPTIDE: CPT

## 2024-02-09 PROCEDURE — 99285 EMERGENCY DEPT VISIT HI MDM: CPT

## 2024-02-09 PROCEDURE — 2580000003 HC RX 258: Performed by: INTERNAL MEDICINE

## 2024-02-09 PROCEDURE — 80048 BASIC METABOLIC PNL TOTAL CA: CPT

## 2024-02-09 PROCEDURE — 5A09357 ASSISTANCE WITH RESPIRATORY VENTILATION, LESS THAN 24 CONSECUTIVE HOURS, CONTINUOUS POSITIVE AIRWAY PRESSURE: ICD-10-PCS | Performed by: EMERGENCY MEDICINE

## 2024-02-09 PROCEDURE — 84439 ASSAY OF FREE THYROXINE: CPT

## 2024-02-09 PROCEDURE — 87040 BLOOD CULTURE FOR BACTERIA: CPT

## 2024-02-09 PROCEDURE — 93005 ELECTROCARDIOGRAM TRACING: CPT | Performed by: EMERGENCY MEDICINE

## 2024-02-09 PROCEDURE — 83930 ASSAY OF BLOOD OSMOLALITY: CPT

## 2024-02-09 RX ORDER — IPRATROPIUM BROMIDE AND ALBUTEROL SULFATE 2.5; .5 MG/3ML; MG/3ML
1 SOLUTION RESPIRATORY (INHALATION)
Status: COMPLETED | OUTPATIENT
Start: 2024-02-09 | End: 2024-02-09

## 2024-02-09 RX ORDER — MORPHINE SULFATE 2 MG/ML
2 INJECTION, SOLUTION INTRAMUSCULAR; INTRAVENOUS ONCE
Status: COMPLETED | OUTPATIENT
Start: 2024-02-09 | End: 2024-02-09

## 2024-02-09 RX ORDER — GLYCOPYRROLATE 0.2 MG/ML
0.2 INJECTION INTRAMUSCULAR; INTRAVENOUS EVERY 4 HOURS PRN
Status: DISCONTINUED | OUTPATIENT
Start: 2024-02-09 | End: 2024-02-10 | Stop reason: HOSPADM

## 2024-02-09 RX ORDER — HYDROXYZINE PAMOATE 25 MG/1
25 CAPSULE ORAL EVERY 6 HOURS PRN
COMMUNITY

## 2024-02-09 RX ORDER — LORAZEPAM 2 MG/ML
1 INJECTION INTRAMUSCULAR ONCE
Status: COMPLETED | OUTPATIENT
Start: 2024-02-09 | End: 2024-02-09

## 2024-02-09 RX ORDER — LORAZEPAM 2 MG/ML
1 INJECTION INTRAMUSCULAR EVERY 6 HOURS PRN
Status: DISCONTINUED | OUTPATIENT
Start: 2024-02-09 | End: 2024-02-10 | Stop reason: HOSPADM

## 2024-02-09 RX ORDER — MEGESTROL ACETATE 40 MG/ML
800 SUSPENSION ORAL DAILY
COMMUNITY

## 2024-02-09 RX ORDER — LEVOTHYROXINE SODIUM ANHYDROUS 100 UG/5ML
25 INJECTION, POWDER, LYOPHILIZED, FOR SOLUTION INTRAVENOUS ONCE
Status: COMPLETED | OUTPATIENT
Start: 2024-02-09 | End: 2024-02-09

## 2024-02-09 RX ORDER — MIDODRINE HYDROCHLORIDE 5 MG/1
10 TABLET ORAL ONCE
Status: DISCONTINUED | OUTPATIENT
Start: 2024-02-09 | End: 2024-02-09

## 2024-02-09 RX ORDER — IPRATROPIUM BROMIDE AND ALBUTEROL SULFATE 2.5; .5 MG/3ML; MG/3ML
1 SOLUTION RESPIRATORY (INHALATION) EVERY 4 HOURS PRN
COMMUNITY

## 2024-02-09 RX ORDER — SODIUM CHLORIDE 9 MG/ML
INJECTION, SOLUTION INTRAVENOUS PRN
Status: DISCONTINUED | OUTPATIENT
Start: 2024-02-09 | End: 2024-02-09

## 2024-02-09 RX ORDER — GUAIFENESIN 200 MG/10ML
200 LIQUID ORAL EVERY 6 HOURS PRN
COMMUNITY
Start: 2024-02-09 | End: 2024-02-12

## 2024-02-09 RX ORDER — ALBUMIN (HUMAN) 12.5 G/50ML
25 SOLUTION INTRAVENOUS ONCE
Status: COMPLETED | OUTPATIENT
Start: 2024-02-09 | End: 2024-02-09

## 2024-02-09 RX ORDER — 0.9 % SODIUM CHLORIDE 0.9 %
30 INTRAVENOUS SOLUTION INTRAVENOUS ONCE
Status: COMPLETED | OUTPATIENT
Start: 2024-02-09 | End: 2024-02-09

## 2024-02-09 RX ORDER — ONDANSETRON 2 MG/ML
4 INJECTION INTRAMUSCULAR; INTRAVENOUS EVERY 6 HOURS PRN
Status: DISCONTINUED | OUTPATIENT
Start: 2024-02-09 | End: 2024-02-10 | Stop reason: HOSPADM

## 2024-02-09 RX ORDER — SODIUM CHLORIDE, SODIUM LACTATE, POTASSIUM CHLORIDE, AND CALCIUM CHLORIDE .6; .31; .03; .02 G/100ML; G/100ML; G/100ML; G/100ML
500 INJECTION, SOLUTION INTRAVENOUS ONCE
Status: COMPLETED | OUTPATIENT
Start: 2024-02-09 | End: 2024-02-09

## 2024-02-09 RX ADMIN — MORPHINE SULFATE 1 MG/HR: 50 INJECTION, SOLUTION, CONCENTRATE INTRAVENOUS at 21:47

## 2024-02-09 RX ADMIN — PIPERACILLIN AND TAZOBACTAM 3375 MG: 3; .375 INJECTION, POWDER, FOR SOLUTION INTRAVENOUS at 18:23

## 2024-02-09 RX ADMIN — IOPAMIDOL 75 ML: 755 INJECTION, SOLUTION INTRAVENOUS at 10:09

## 2024-02-09 RX ADMIN — IPRATROPIUM BROMIDE AND ALBUTEROL SULFATE 1 DOSE: 2.5; .5 SOLUTION RESPIRATORY (INHALATION) at 06:44

## 2024-02-09 RX ADMIN — LORAZEPAM 1 MG: 2 INJECTION INTRAMUSCULAR; INTRAVENOUS at 21:39

## 2024-02-09 RX ADMIN — GLYCOPYRROLATE 0.2 MG: 0.2 INJECTION INTRAMUSCULAR; INTRAVENOUS at 22:31

## 2024-02-09 RX ADMIN — MORPHINE SULFATE 2 MG: 2 INJECTION, SOLUTION INTRAMUSCULAR; INTRAVENOUS at 21:40

## 2024-02-09 RX ADMIN — ALBUMIN (HUMAN) 25 G: 0.25 INJECTION, SOLUTION INTRAVENOUS at 11:14

## 2024-02-09 RX ADMIN — SODIUM BICARBONATE: 84 INJECTION, SOLUTION INTRAVENOUS at 11:22

## 2024-02-09 RX ADMIN — IPRATROPIUM BROMIDE AND ALBUTEROL SULFATE 1 DOSE: 2.5; .5 SOLUTION RESPIRATORY (INHALATION) at 06:46

## 2024-02-09 RX ADMIN — PHYTONADIONE 5 MG: 10 INJECTION, EMULSION INTRAMUSCULAR; INTRAVENOUS; SUBCUTANEOUS at 18:11

## 2024-02-09 RX ADMIN — ALBUMIN (HUMAN) 25 G: 0.25 INJECTION, SOLUTION INTRAVENOUS at 11:08

## 2024-02-09 RX ADMIN — SODIUM CHLORIDE 2013 ML: 9 INJECTION, SOLUTION INTRAVENOUS at 07:50

## 2024-02-09 RX ADMIN — SODIUM CHLORIDE, POTASSIUM CHLORIDE, SODIUM LACTATE AND CALCIUM CHLORIDE 500 ML: 600; 310; 30; 20 INJECTION, SOLUTION INTRAVENOUS at 17:16

## 2024-02-09 RX ADMIN — SODIUM CHLORIDE 5 MCG/MIN: 9 INJECTION, SOLUTION INTRAVENOUS at 08:25

## 2024-02-09 RX ADMIN — CEFEPIME 2000 MG: 2 INJECTION, POWDER, FOR SOLUTION INTRAVENOUS at 10:26

## 2024-02-09 RX ADMIN — IPRATROPIUM BROMIDE AND ALBUTEROL SULFATE 1 DOSE: 2.5; .5 SOLUTION RESPIRATORY (INHALATION) at 06:45

## 2024-02-09 RX ADMIN — VANCOMYCIN HYDROCHLORIDE 1500 MG: 10 INJECTION, POWDER, LYOPHILIZED, FOR SOLUTION INTRAVENOUS at 08:05

## 2024-02-09 RX ADMIN — LEVOTHYROXINE SODIUM ANHYDROUS 25 MCG: 100 INJECTION, POWDER, LYOPHILIZED, FOR SOLUTION INTRAVENOUS at 18:07

## 2024-02-09 ASSESSMENT — ENCOUNTER SYMPTOMS
ABDOMINAL DISTENTION: 0
SHORTNESS OF BREATH: 1
WHEEZING: 0
COUGH: 0
ABDOMINAL PAIN: 0

## 2024-02-09 ASSESSMENT — PAIN SCALES - GENERAL
PAINLEVEL_OUTOF10: 0
PAINLEVEL_OUTOF10: 0

## 2024-02-09 NOTE — PROGRESS NOTES
Database initiated. Patient is AX0 comes in from [facility]. He ambulates with a walker and 2 assist and has been requiring oxygen.

## 2024-02-09 NOTE — PROGRESS NOTES
CI HR MAP TPRI SVI TFC   Baseline 3.0 69   44 80.1   Test 4.2 71   60 82.5   % Change 40.3 3.2   35.9 3.0   noninvasive -  discontinue

## 2024-02-09 NOTE — PROGRESS NOTES
4 Eyes Skin Assessment     NAME:  Juan Sotelo  YOB: 1934  MEDICAL RECORD NUMBER:  [id number]    The patient is being assessed for  Admission    I agree that at least one RN has performed a thorough Head to Toe Skin Assessment on the patient. ALL assessment sites listed below have been assessed.      Areas assessed by both nurses:    Head, Face, Ears, Shoulders, Back, Chest, Arms, Elbows, Hands, Sacrum. Buttock, Coccyx, Ischium, and Legs. Feet and Heels        Does the Patient have a Wound? Yes wound(s) were present on assessment. LDA wound assessment was Initiated and completed by RN       Tutu Prevention initiated by RN: Yes  Wound Care Orders initiated by RN: Yes    Pressure Injury (Stage 3,4, Unstageable, DTI, NWPT, and Complex wounds) if present, place Wound referral order by RN under : Yes Coccyx    New Ostomies, if present place, Ostomy referral order under : No     Nurse 1 eSignature: Electronically signed by Dax Rosen RN on 2/9/24 at 4:42 PM EST    **SHARE this note so that the co-signing nurse can place an eSignature**    Nurse 2 eSignature: Electronically signed by Jailyn Rodriguez RN on 2/9/24 at 6:51 PM EST

## 2024-02-09 NOTE — ED PROVIDER NOTES
89-year-old male with history of congestive heart failure, A-fib, coronary disease he is at [facility] for rehab.  He also has a history of C. difficile back in December and was on vancomycin he is currently not on any antibiotics per he is also on midodrine for his history of hypertension.  Presents emerged part from [facility] for shortness of breath and confusion.  He does not wear oxygen at home.  The patient is full code.  No reports of fever          Review of Systems   Pertinent review of systems in HPI  Physical Exam  Vitals reviewed.   Constitutional:       General: He is not in acute distress.     Appearance: He is not ill-appearing.   HENT:      Head: Normocephalic.      Right Ear: External ear normal.      Left Ear: External ear normal.      Nose: Nose normal.      Mouth/Throat:      Mouth: Mucous membranes are moist.   Eyes:      General:         Right eye: No discharge.         Left eye: No discharge.      Conjunctiva/sclera: Conjunctivae normal.   Cardiovascular:      Rate and Rhythm: Normal rate and regular rhythm.      Heart sounds:      No friction rub. No gallop.   Pulmonary:      Effort: Respiratory distress present.      Breath sounds: No stridor. No rales.   Abdominal:      General: There is no distension.      Tenderness: There is no abdominal tenderness. There is no guarding or rebound.   Musculoskeletal:         General: No deformity or signs of injury.      Cervical back: Normal range of motion and neck supple. No rigidity or tenderness.   Skin:     Coloration: Skin is not jaundiced.   Neurological:      Mental Status: He is alert.      Sensory: No sensory deficit.      Motor: No weakness.   Psychiatric:         Mood and Affect: Mood normal.         Behavior: Behavior normal.          Procedures         EKG:  This EKG is signed by emergency department physician.    Rate: 70  Rhythm: Ventricular  Ventricular paced rhythm with mitral potation QTc 48  Elevated   Comparison: Compared

## 2024-02-09 NOTE — PROGRESS NOTES
Family did request to speak to provider regarding decision on goals of care. They wish to keep patient comfortable and do not want to escalate care; no intubation, compressions, or advancing care. They note if patient having labored breathing they wish to make him more peaceful. He does have labored breathing on examination. They did speak about family visiting, advised to call them in tonight, discussed keeping patient on BIPAP until family arrives, after which will switch to nasal cannula and further make him comfortable. Hospice consult was placed. Code status changed in chart to limited - no intubation, CPR, medications or shock while awaiting family arrival. Patient does have pacemaker/defibrillator.     Signed Maricarmen Simental D.O., PGY-2. 2/9/2024 6:57 PM

## 2024-02-09 NOTE — PROGRESS NOTES
Date: 2/9/2024    Time: 12:51 PM    Patient Placed On BIPAP/CPAP/ Non-Invasive Ventilation?  No remains on.    If no must comment.  Facial area red/color change? No           If YES are Blister/Lesion present?No   If yes must notify nursing staff  BIPAP/CPAP skin barrier?  Yes    Skin barrier type:mepilexlite       Comments: red outlet.        Jessica Flores RCP

## 2024-02-09 NOTE — ED TRIAGE NOTES
Pt is from [facility]. EMS reports that they were called this morning with reports of sob and altered mental status. Pt was 93% on 6L upon arrival. Hypotensive, tachypnea, and accessory muscle use upon arrival. Minimally responsive. Respiratory called for a bipap. Pt was previously dx with c diff a few months ago, nursing home reports that the pt has been experiencing diarrhea recently again.

## 2024-02-09 NOTE — PROGRESS NOTES
Patient admitted from ER 18 to 206, with the following belongings; Socks, placed on monitor, patient oriented to room and unit visiting hours.  Patient guide at bedside, reviewed patient rights and responsibilities. MRSA nasal swab obtained.  Bed alarm on.  Call light within reach.

## 2024-02-09 NOTE — PROGRESS NOTES
02/09/24 1544   NIV Type   NIV Started/Stopped On   Equipment Type v60   Mode AVAPS   Mask Type Full face mask   Mask Size Large   Assessment   Pulse 70   Respirations 24   SpO2 100 %   Level of Consciousness 1   Comfort Level Good   Using Accessory Muscles Yes   Mask Compliance Poor  (PULLS AT MASK CAUSING IRRITATION ON BRIDGE OF NOSE)   Skin Assessment Redness (see comment/note)  (RED ON BRIDGE)   Skin Protection for O2 Device Yes   Orientation Middle   Location Nose   Breath Sounds   Breath Sounds Bilateral Rhonchi   Right Upper Lobe Rhonchi   Right Middle Lobe Rhonchi   Right Lower Lobe Rhonchi   Left Upper Lobe Rhonchi   Left Lower Lobe Rhonchi   Settings/Measurements   PIP Observed 19 cm H20   CPAP/EPAP 6 cmH2O   IPAP Min 18 cmH2O   IPAP Max 28 cmH2O   Vt (Set, mL) 465 mL   Vt (Measured) 571 mL   Rate Ordered 20   Insp Rise Time (%) 3 %   FiO2  40 %   I Time/ I Time % 0.9 s   Minute Volume (L/min) 14.7 Liters   Mask Leak (lpm) 20 lpm   Patient's Home Machine No   Alarm Settings   Alarms On Y   Low Pressure (cmH2O) 8 cmH2O   High Pressure (cmH2O) 35 cmH2O   Apnea (secs) 20 secs   RR Low (bpm) 18   RR High (bpm) 50 br/min   Patient Transport   Time Spent Transporting 1-15   Transport Ventillation Type Noninvasive   Transport From ER   Transport Destination ICU   Emergency Equipment Included Yes

## 2024-02-09 NOTE — PROGRESS NOTES
Pt trialed off BiPAP at this time per Dr. Lee. Placed on 3L NC sat 98%. Redness and breakdown on nose under mepilex. Noted that pt had previously been pulling at mask causing friction on nose. Dr. Gold made aware.

## 2024-02-09 NOTE — FLOWSHEET NOTE
Received request for right thoracentesis. INR needs to be corrected before procedure can be done and coumadin needs to be held. Will re-evaluate on Monday.

## 2024-02-09 NOTE — CONSULTS
Palliative Care Department  [phone]  Palliative Care Initial Consult  Provider Lynda Tinsley, APRN - CNP     Juan Sotelo  [id number]  Hospital Day: 1  Date of Initial Consult: 2/9/24  Referring Provider: Dr. Gold  Palliative Medicine was consulted for assistance with: Goals of care    HPI:   Juan Sotelo is a 89 y.o. with a medical history of AAA, CAD CHF hyperlipidemia hypertension status post CABG x 2, atrial fibrillation and pacemaker  who was admitted on 2/9/2024 from a nursing home with a CHIEF COMPLAINT of shortness of breath.   Chest x-ray showed worsening right-sided pleural effusion and persistent small left pleural effusion with opacities at the left lung base.  Thoracentesis held due to elevated INR.  He was placed on the BiPAP and ABG showed acidosis.  He was also started on vasopressors for hypotension.  He will be admitted to the ICU for further medical management.    ASSESSMENT/PLAN:     Pertinent Hospital Diagnoses     Acute hypoxic respiratory failure  Acute on chronic CHF exacerbation  Bilateral pleural effusions    Palliative Care Encounter / Counseling Regarding Goals of Care  Please see detailed goals of care discussion as below  At this time, Juan Sotelo, Does Not have capacity for medical decision-making.  Capacity is time limited and situation/question specific  Outcome of goals of care meeting:   Continue current medical management and to CODE STATUS as a full code  Family to discuss CODE STATUS and goals of care further  Code status Full Code  Advanced Directives: no POA or living will in [facility]  Surrogate/Legal NOK:  Irma Deepak (spouse) [phone] or [phone]  Daniel Sotelo (son) [phone]  Spiritual assessment: no spiritual distress identified  Bereavement and grief: to be determined  Referrals to: none today  SUBJECTIVE:     Current medical issues leading to Palliative Medicine involvement include   Active Hospital Problems    Diagnosis Date Noted     Sepsis (HCC) [A41.9] 02/09/2024    Septic shock (HCC) [A41.9, R65.21] 02/09/2024       Details of Conversation: Chart reviewed and met with the patient and his daughter at the bedside.  I introduced palliative medicine.  The patient is minimally responsive on the BiPAP.  The patient's daughter explains that their mother was not feeling well and had just left.  She explains that their mom is primary decision maker.  She expresses that her mom has been persistent about wanting to have everything done, stating that she could not live with herself if she did not do more.  We discussed the different types of CODE STATUS.  We also discussed with his comorbidities at the point of an arrest the poor chance of a meaningful recovery after an arrest.  The daughter explains that she is going to try and have a conversation with her mother.  She explains that the family is in disagreement about this.  Offered to set up a meeting in the future to discuss goals of care and advanced directives.  Provided a pamphlet with further examination about CODE STATUS.  Will continue to follow.     OBJECTIVE:   Prognosis: Guarded    Physical Exam:  BP (!) 103/56   Pulse 70   Temp 97.6 °F (36.4 °C) (Axillary)   Resp 23   Ht 1.778 m (5' 10\")   Wt 67.1 kg (148 lb)   SpO2 99%   BMI 21.24 kg/m²   Constitutional:  Elderly, thin, ill-appearing  Lungs: Respirations easy and unlabored, on BiPAP  Heart:  RRR, no murmur, rub, or gallop noted during exam  Abd:  Soft, non tender, non distended, bowel sounds present  Ext:  Moving all extremities, no edema, pulses present  Skin:  Warm and dry, + bruising  Psych: non-anxious affect  Neuro: Minimally responsive, not following commands, confusion    Objective data reviewed: labs, images, records, medication use, vitals, and chart    Discussed patient and the plan of care with the other IDT members: Palliative Medicine IDT Team    Time/Communication  Greater than 50% of time spent, total 40 minutes in

## 2024-02-09 NOTE — ED NOTES
This nurse went in to patients room and he was 85% on 2L.  Increased O2 to 4L and notified physicians.  Dr. Gold at bedside and respiratory notified.  Patient placed back on BiPap and Levophed restarted for BP 88/57 per Dr. Gold's order

## 2024-02-09 NOTE — CONSULTS
Critical Care Admit/Consult Note         Patient - Juan Sotelo   MRN -  [id number]   [id number]   - 10/6/1934      Date of Admission -  2024  6:29 AM  Date of evaluation -  2024   Hospital Day - 0            ADMIT/CONSULT DETAILS     Reason for Admit/Consult   Septic shock    Consulting Service/Physician   Consulting - Dayanara Reyes MD  Primary Care Physician - Ronni Fried MD     ICU attending - Dr. Rosa BOND   The patient is a 89 y.o. male with significant past medical history of congestive heart failure, atrial fibrillation, coronary disease and pacemaker in situ presents from the facility with Shortness of Breath  As per chart review, patient has a history of C. difficile in December which was treated with vancomycin.  Patient is not taking his home midodrine.  Patient is not on any oxygen at baseline at home.  Talked to patient's family.  As per his son patient was awake and alert yesterday in the nursing home and he just started doing his PT OT.  They got called this morning saying that his dad is more confused and short of breath.  He was desaturating at 80s When he was brought to the ED    On presentation to the ED, patient was confused, hypoxic, hypertensive.  Blood work showed leukocytosis.  Since patient presented from the nursing facility patient was treated with cefepime and vancomycin.  Patient was placed in the BiPAP in ED and nasal ABG which drawn which showed metabolic acidosis with mild respiratory acidosis.  Patient was given albumin and started on bicarb drip for his acidosis.  For his low blood pressure patient was given 2039 mL of normal saline bolus and was started on sodium bicarb with 5% dextrose infusion at 75 mill per hour.  He was also started on Levophed.  Patient's cortisol is pending at the moment.  His PT/INR is 3.4.  Patient is chronically on Coumadin.  Urinalysis was less likely urine infection.  His TSH is 5.  Free T4 is 0.8.  Lactic acid is  hold from biopsy due to patient's critical condition at the moment  -Plan PET CT scan outpatient    Cardiovascular  Systolic congestive heart failure  -4/19/2022 echocardiogram so systolic heart failure with ejection fraction of 50%, RVSP 29.  -Home medications include Bumex 0.5 Mg, Entresto, Toprol 25 Mg once daily  -BNP today elevated at 6823  -Troponin elevated at 73 with negative delta  -Repeat echocardiogram.    History of CABG and CAD  -Patient is not on aspirin or Plavix because the patient is chronically on warfarin 2 mg    Chronic atrial fibrillation  -Chronically anticoagulated with warfarin 2 mg once daily  -Rate control with metoprolol succinate 25 Mg 1 tablet daily    MVR/TV repair    Biventricular ICD  -Last device check was done on 11/17/2023.  -Device check from today pending.    Abdominal aortic aneurysm surgical procedure endovascular stent graft    # History of hypotension:  -Home medication includes midodrine 5 mg 1 tablet 3 times daily      GI  Recent history of C. difficile colitis  -Patient completed course of vancomycin for 10 days    Renal  LEYDI:  -Most likely prerenal  -Decrease urine output.  With 500 mL bolus of Ringer's lactate patient's urine output getting better.  -Monitor I's and O's  -Patient's baseline creatinine around 0.3-0.9.  Patient's creatinine today is 1.4.  -Will order urine electrolytes  -Continue IV fluid.    Infectious disease  Septic shock:  -With elevated white counts, ESR, lactic acid  -Continue maintenance fluid  -Become was fluid responsive.  Patient given 500 mL of bolus.  -Start patient on pressures needed  -Start patient on Zosyn and vancomycin  -Continue sodium bicarb drip.  Patient continues to be on metabolic acidosis.  Increase the rate to 100 mL/h.    Heme/Onc  Leukocytosis:  -White counts today 26.6.  -Aspiratory panel COVID and influenza negative.  blood culture negative so far.  Will follow final culture.    # Anemia  -Baseline hemoglobin around 8-9.

## 2024-02-09 NOTE — PROGRESS NOTES
Pharmacy Consultation Note  (Antibiotic Dosing and Monitoring)    Initial consult date: 2/9/24  Consulting physician/provider: Dr Jackson  Drug: Vancomycin  Indication: sepsis    Age/  Gender Height Weight IBW  Allergy Information   89 y.o./male @FLOW(11:first:1)@ @FLOW[14:FIRST:1@     Ideal body weight: 73 kg (160 lb 15 oz)   Codeine      Renal Function:  Recent Labs     02/09/24  0654 02/09/24  1630   BUN 28* 30*   CREATININE 1.4* 1.5*       Intake/Output Summary (Last 24 hours) at 2/9/2024 1827  Last data filed at 2/9/2024 1600  Gross per 24 hour   Intake 300.06 ml   Output 40 ml   Net 260.06 ml       Vancomycin Monitoring:  Trough:  No results for input(s): \"VANCOTROUGH\" in the last 72 hours.  Random:  No results for input(s): \"VANCORANDOM\" in the last 72 hours.    Vancomycin Administration Times:  Recent vancomycin administrations                     vancomycin (VANCOCIN) 1,500 mg in sodium chloride 0.9 % 250 mL IVPB (mg) 1,500 mg New Bag 02/09/24 0805                    Assessment:  Patient is a 89 y.o. male who has been initiated on vancomycin  Estimated Creatinine Clearance: 29 mL/min (A) (based on SCr of 1.5 mg/dL (H)).  To dose vancomycin, pharmacy will be utilizing [employer] calculation software for goal AUC/ELISE 400-600 mg/L-hr (predicted AUC/ELISE = 541, Tr =16.6 mcg/mL)    Plan:  Will continue vancomycin 750 mg IV every 24 hours  Will check vancomycin levels when appropriate  Will continue to monitor renal function   Pharmacy to follow      Justice Han RPH 2/9/2024 6:27 PM    VILMA: [phone]  SEY: [phone]  SJW: [phone]

## 2024-02-10 PROCEDURE — 6360000002 HC RX W HCPCS: Performed by: NURSE PRACTITIONER

## 2024-02-10 PROCEDURE — 94660 CPAP INITIATION&MGMT: CPT

## 2024-02-10 RX ORDER — GLYCOPYRROLATE 0.2 MG/ML
0.2 INJECTION INTRAMUSCULAR; INTRAVENOUS ONCE
Status: COMPLETED | OUTPATIENT
Start: 2024-02-10 | End: 2024-02-10

## 2024-02-10 RX ADMIN — GLYCOPYRROLATE 0.2 MG: 0.2 INJECTION INTRAMUSCULAR; INTRAVENOUS at 00:55

## 2024-02-10 NOTE — PROGRESS NOTES
SPIRITUAL HEALTH SERVICES - [facility]  PROGRESS NOTE    Name: Juan Sotelo                Nondenominational: Sabianist   Anointed (Last Rites): No    Referral:  Called in (On Call) by Clinical Manager    Assessment:  Upon entering the room  observes multiple family members present. Wife, son, two daughters, son-in-law, and four grandchildren.  Patient was near end of life. Family seemed to be coping well.       Intervention:   provided a listening presence and offered prayer for the patient and the family.     Outcome:  The family of the patient expressed gratitude.    Plan:   expressed he would be available if further support was needed.      Electronically signed by Chaplain Stanislav, on 2/9/2024 at 9:41 PM.  Spiritual Care Department  [facility]  [phone]

## 2024-02-10 NOTE — PROGRESS NOTES
02/09/24 1955   NIV Type   NIV Started/Stopped On   Equipment Type v60   Mode AVAPS   Mask Type Full face mask   Mask Size Large   Assessment   Pulse 70   Heart Rate Source Monitor   Respirations 30   Level of Consciousness 1   Comfort Level Good   Using Accessory Muscles Yes   Mask Compliance Fair   Skin Assessment Redness (see comment/note)  (on bridge of nose.)   Skin Protection for O2 Device Yes   Orientation Middle   Location Nose   Settings/Measurements   PIP Observed 19 cm H20   CPAP/EPAP 6 cmH2O   IPAP Min 18 cmH2O   IPAP Max 28 cmH2O   Vt (Set, mL) 465 mL   Vt (Measured) 680 mL   Rate Ordered 20   Insp Rise Time (%) 3 %   FiO2  40 %   I Time/ I Time % 0.9 s   Minute Volume (L/min) 20.5 Liters   Mask Leak (lpm) 27 lpm   Patient's Home Machine No   Alarm Settings   Alarms On Y   Low Pressure (cmH2O) 8 cmH2O   High Pressure (cmH2O) 35 cmH2O   RR Low (bpm) 18   RR High (bpm) 50 br/min

## 2024-02-11 LAB
MICROORGANISM SPEC CULT: ABNORMAL
MICROORGANISM/AGENT SPEC: ABNORMAL
MICROORGANISM/AGENT SPEC: ABNORMAL
SERVICE CMNT-IMP: ABNORMAL
SERVICE CMNT-IMP: ABNORMAL
SPECIMEN DESCRIPTION: ABNORMAL

## 2024-02-12 LAB
MICROORGANISM SPEC CULT: ABNORMAL
MICROORGANISM/AGENT SPEC: ABNORMAL
MICROORGANISM/AGENT SPEC: ABNORMAL
SERVICE CMNT-IMP: ABNORMAL
SERVICE CMNT-IMP: ABNORMAL
SPECIMEN DESCRIPTION: ABNORMAL
SPECIMEN DESCRIPTION: ABNORMAL

## 2024-02-13 LAB
ABO/RH: NORMAL
ANTIBODY SCREEN: NEGATIVE
ARM BAND NUMBER: NORMAL
BLOOD BANK DISPENSE STATUS: NORMAL
BLOOD BANK SAMPLE EXPIRATION: NORMAL
BPU ID: NORMAL
COMPONENT: NORMAL
CROSSMATCH RESULT: NORMAL
TRANSFUSION STATUS: NORMAL
UNIT DIVISION: 0

## 2024-02-14 NOTE — H&P
I did not see this patient   He was admitted from ER to icu   HE passed away prior to be being seen by our team     Dayanara Reyes md